# Patient Record
Sex: FEMALE | Race: BLACK OR AFRICAN AMERICAN | Employment: FULL TIME | URBAN - NONMETROPOLITAN AREA
[De-identification: names, ages, dates, MRNs, and addresses within clinical notes are randomized per-mention and may not be internally consistent; named-entity substitution may affect disease eponyms.]

---

## 2022-01-01 ENCOUNTER — APPOINTMENT (OUTPATIENT)
Dept: CT IMAGING | Age: 57
DRG: 023 | End: 2022-01-01

## 2022-01-01 ENCOUNTER — APPOINTMENT (OUTPATIENT)
Dept: GENERAL RADIOLOGY | Age: 57
DRG: 023 | End: 2022-01-01

## 2022-01-01 ENCOUNTER — ANESTHESIA EVENT (OUTPATIENT)
Dept: OPERATING ROOM | Age: 57
DRG: 023 | End: 2022-01-01

## 2022-01-01 ENCOUNTER — APPOINTMENT (OUTPATIENT)
Dept: NUCLEAR MEDICINE | Age: 57
DRG: 023 | End: 2022-01-01

## 2022-01-01 ENCOUNTER — ANESTHESIA (OUTPATIENT)
Dept: OPERATING ROOM | Age: 57
DRG: 023 | End: 2022-01-01

## 2022-01-01 ENCOUNTER — HOSPITAL ENCOUNTER (INPATIENT)
Age: 57
LOS: 7 days | DRG: 023 | End: 2022-07-07
Attending: EMERGENCY MEDICINE | Admitting: INTERNAL MEDICINE

## 2022-01-01 DIAGNOSIS — Z52.9 ORGAN DONATION: ICD-10-CM

## 2022-01-01 DIAGNOSIS — D49.9 TUMOR: ICD-10-CM

## 2022-01-01 DIAGNOSIS — I62.9 INTRACRANIAL HEMORRHAGE (HCC): Primary | ICD-10-CM

## 2022-01-01 LAB
% INHIBITION AA: 2.6 %
% INHIBITION ADP: 92.2 %
AA % AGGREGATION: 97.4 %
AA AGONIST MAX AMPLITUDE: 64.5 MM (ref 51–71)
ABO: NORMAL
ACCELERATED MAX AMPLITUDE: 65.1 MM (ref 52–70)
ACETAMINOPHEN LEVEL: < 5 UG/ML (ref 0–20)
ACINETOBACTER CALCOACETICUS-BAUMANNII BY PCR: NOT DETECTED
ADENOVIRUS BY PCR: NOT DETECTED
ADP AGONIST MAX AMPLITUDE: 19.9 MM (ref 45–69)
ADP PERCENT AGGREGATION: 7.8 %
ALBUMIN SERPL-MCNC: 2.2 G/DL (ref 3.5–5.1)
ALBUMIN SERPL-MCNC: 2.5 G/DL (ref 3.5–5.1)
ALBUMIN SERPL-MCNC: 2.8 G/DL (ref 3.5–5.1)
ALBUMIN SERPL-MCNC: 4.5 G/DL (ref 3.5–5.1)
ALLEN TEST: ABNORMAL
ALLEN TEST: NEGATIVE
ALLEN TEST: NORMAL
ALP BLD-CCNC: 106 U/L (ref 38–126)
ALP BLD-CCNC: 54 U/L (ref 38–126)
ALP BLD-CCNC: 95 U/L (ref 38–126)
ALP BLD-CCNC: 96 U/L (ref 38–126)
ALT SERPL-CCNC: 19 U/L (ref 11–66)
ALT SERPL-CCNC: 6 U/L (ref 11–66)
ALT SERPL-CCNC: < 5 U/L (ref 11–66)
ALT SERPL-CCNC: < 5 U/L (ref 11–66)
AMPHETAMINE+METHAMPHETAMINE URINE SCREEN: NEGATIVE
AMYLASE: 84 U/L (ref 20–104)
ANION GAP SERPL CALCULATED.3IONS-SCNC: 10 MEQ/L (ref 8–16)
ANION GAP SERPL CALCULATED.3IONS-SCNC: 10 MEQ/L (ref 8–16)
ANION GAP SERPL CALCULATED.3IONS-SCNC: 11 MEQ/L (ref 8–16)
ANION GAP SERPL CALCULATED.3IONS-SCNC: 12 MEQ/L (ref 8–16)
ANION GAP SERPL CALCULATED.3IONS-SCNC: 13 MEQ/L (ref 8–16)
ANION GAP SERPL CALCULATED.3IONS-SCNC: 18 MEQ/L (ref 8–16)
ANION GAP SERPL CALCULATED.3IONS-SCNC: 7 MEQ/L (ref 8–16)
ANION GAP SERPL CALCULATED.3IONS-SCNC: 7 MEQ/L (ref 8–16)
ANION GAP SERPL CALCULATED.3IONS-SCNC: 9 MEQ/L (ref 8–16)
ANTIBODY SCREEN: NORMAL
APTT: 35.3 SECONDS (ref 22–38)
APTT: 37.7 SECONDS (ref 22–38)
AST SERPL-CCNC: 15 U/L (ref 5–40)
AST SERPL-CCNC: 15 U/L (ref 5–40)
AST SERPL-CCNC: 19 U/L (ref 5–40)
AST SERPL-CCNC: 33 U/L (ref 5–40)
AVERAGE GLUCOSE: 123 MG/DL (ref 70–126)
BACTERIA: ABNORMAL
BACTERIA: ABNORMAL /HPF
BARBITURATE QUANTITATIVE URINE: NEGATIVE
BASE EXCESS (CALCULATED): -0.5 MMOL/L (ref -2.5–2.5)
BASE EXCESS (CALCULATED): -0.9 MMOL/L (ref -2.5–2.5)
BASE EXCESS (CALCULATED): -2.6 MMOL/L (ref -2.5–2.5)
BASE EXCESS (CALCULATED): -3.3 MMOL/L (ref -2.5–2.5)
BASE EXCESS (CALCULATED): -5.3 MMOL/L (ref -2.5–2.5)
BASE EXCESS (CALCULATED): 0 MMOL/L (ref -2.5–2.5)
BASE EXCESS MIXED: 2 MMOL/L (ref -2–3)
BASOPHILS # BLD: 0.1 %
BASOPHILS ABSOLUTE: 0 THOU/MM3 (ref 0–0.1)
BENZODIAZEPINE QUANTITATIVE URINE: NEGATIVE
BILIRUB SERPL-MCNC: 0.3 MG/DL (ref 0.3–1.2)
BILIRUB SERPL-MCNC: 0.5 MG/DL (ref 0.3–1.2)
BILIRUB SERPL-MCNC: 0.8 MG/DL (ref 0.3–1.2)
BILIRUB SERPL-MCNC: 0.9 MG/DL (ref 0.3–1.2)
BILIRUBIN DIRECT: 0.6 MG/DL (ref 0–0.3)
BILIRUBIN DIRECT: 0.6 MG/DL (ref 0–0.3)
BILIRUBIN URINE: ABNORMAL
BILIRUBIN URINE: NEGATIVE
BLOOD, URINE: ABNORMAL
BLOOD, URINE: ABNORMAL
BUN BLDV-MCNC: 13 MG/DL (ref 7–22)
BUN BLDV-MCNC: 14 MG/DL (ref 7–22)
BUN BLDV-MCNC: 14 MG/DL (ref 7–22)
BUN BLDV-MCNC: 16 MG/DL (ref 7–22)
BUN BLDV-MCNC: 17 MG/DL (ref 7–22)
C-REACTIVE PROTEIN: 1.29 MG/DL (ref 0–1)
CALCIUM IONIZED: 0.99 MMOL/L (ref 1.12–1.32)
CALCIUM IONIZED: 1.02 MMOL/L (ref 1.12–1.32)
CALCIUM SERPL-MCNC: 10.1 MG/DL (ref 8.5–10.5)
CALCIUM SERPL-MCNC: 7.6 MG/DL (ref 8.5–10.5)
CALCIUM SERPL-MCNC: 7.7 MG/DL (ref 8.5–10.5)
CALCIUM SERPL-MCNC: 8.1 MG/DL (ref 8.5–10.5)
CALCIUM SERPL-MCNC: 8.2 MG/DL (ref 8.5–10.5)
CALCIUM SERPL-MCNC: 8.4 MG/DL (ref 8.5–10.5)
CALCIUM SERPL-MCNC: 8.4 MG/DL (ref 8.5–10.5)
CALCIUM SERPL-MCNC: 8.8 MG/DL (ref 8.5–10.5)
CALCIUM SERPL-MCNC: 8.8 MG/DL (ref 8.5–10.5)
CALCIUM SERPL-MCNC: 8.9 MG/DL (ref 8.5–10.5)
CALCIUM SERPL-MCNC: 9 MG/DL (ref 8.5–10.5)
CALCIUM SERPL-MCNC: 9.1 MG/DL (ref 8.5–10.5)
CALCIUM SERPL-MCNC: 9.1 MG/DL (ref 8.5–10.5)
CALCIUM SERPL-MCNC: 9.3 MG/DL (ref 8.5–10.5)
CALCIUM SERPL-MCNC: 9.5 MG/DL (ref 8.5–10.5)
CANNABINOID QUANTITATIVE URINE: NEGATIVE
CASTS 2: ABNORMAL /LPF
CASTS UA: ABNORMAL /LPF
CASTS: ABNORMAL /LPF
CASTS: ABNORMAL /LPF
CHARACTER, URINE: CLEAR
CHARACTER, URINE: CLEAR
CHLAMYDIA PNEUMONIAE BY PCR: NOT DETECTED
CHLORIDE BLD-SCNC: 102 MEQ/L (ref 98–111)
CHLORIDE BLD-SCNC: 112 MEQ/L (ref 98–111)
CHLORIDE BLD-SCNC: 113 MEQ/L (ref 98–111)
CHLORIDE BLD-SCNC: 115 MEQ/L (ref 98–111)
CHLORIDE BLD-SCNC: 118 MEQ/L (ref 98–111)
CHLORIDE BLD-SCNC: 124 MEQ/L (ref 98–111)
CHLORIDE BLD-SCNC: 127 MEQ/L (ref 98–111)
CHLORIDE BLD-SCNC: 128 MEQ/L (ref 98–111)
CHLORIDE BLD-SCNC: 132 MEQ/L (ref 98–111)
CHLORIDE BLD-SCNC: 138 MEQ/L (ref 98–111)
CHLORIDE BLD-SCNC: 139 MEQ/L (ref 98–111)
CHLORIDE BLD-SCNC: > 140 MEQ/L (ref 98–111)
CITRATED KAOLIN LY30: 0 % (ref 0–2.6)
CITRATED KAOLIN R TIME: 4.7 MINUTES (ref 4.6–9.1)
CO2: 19 MEQ/L (ref 23–33)
CO2: 20 MEQ/L (ref 23–33)
CO2: 21 MEQ/L (ref 23–33)
CO2: 22 MEQ/L (ref 23–33)
CO2: 23 MEQ/L (ref 23–33)
CO2: 24 MEQ/L (ref 23–33)
CO2: 27 MEQ/L (ref 23–33)
COCAINE METABOLITE QUANTITATIVE URINE: NEGATIVE
COLLECTED BY:: ABNORMAL
COLLECTED BY:: NORMAL
COLOR: ABNORMAL
COLOR: YELLOW
CREAT SERPL-MCNC: 1.2 MG/DL (ref 0.4–1.2)
CREAT SERPL-MCNC: 1.3 MG/DL (ref 0.4–1.2)
CREAT SERPL-MCNC: 1.4 MG/DL (ref 0.4–1.2)
CREAT SERPL-MCNC: 1.4 MG/DL (ref 0.4–1.2)
CREAT SERPL-MCNC: 1.5 MG/DL (ref 0.4–1.2)
CREAT SERPL-MCNC: 1.6 MG/DL (ref 0.4–1.2)
CREAT SERPL-MCNC: 1.6 MG/DL (ref 0.4–1.2)
CREAT SERPL-MCNC: 1.7 MG/DL (ref 0.4–1.2)
CREAT SERPL-MCNC: 1.8 MG/DL (ref 0.4–1.2)
CREAT SERPL-MCNC: 1.9 MG/DL (ref 0.4–1.2)
CREAT SERPL-MCNC: 1.9 MG/DL (ref 0.4–1.2)
CRYSTALS, UA: ABNORMAL
CRYSTALS: ABNORMAL
DEVICE: ABNORMAL
DEVICE: NORMAL
EKG ATRIAL RATE: 92 BPM
EKG ATRIAL RATE: 94 BPM
EKG ATRIAL RATE: 99 BPM
EKG P AXIS: 41 DEGREES
EKG P AXIS: 48 DEGREES
EKG P AXIS: 59 DEGREES
EKG P-R INTERVAL: 126 MS
EKG P-R INTERVAL: 134 MS
EKG P-R INTERVAL: 136 MS
EKG Q-T INTERVAL: 342 MS
EKG Q-T INTERVAL: 348 MS
EKG Q-T INTERVAL: 404 MS
EKG QRS DURATION: 78 MS
EKG QRS DURATION: 80 MS
EKG QRS DURATION: 84 MS
EKG QTC CALCULATION (BAZETT): 430 MS
EKG QTC CALCULATION (BAZETT): 438 MS
EKG QTC CALCULATION (BAZETT): 505 MS
EKG R AXIS: 59 DEGREES
EKG R AXIS: 66 DEGREES
EKG R AXIS: 73 DEGREES
EKG T AXIS: -3 DEGREES
EKG T AXIS: 49 DEGREES
EKG T AXIS: 49 DEGREES
EKG VENTRICULAR RATE: 92 BPM
EKG VENTRICULAR RATE: 94 BPM
EKG VENTRICULAR RATE: 99 BPM
ENTEROBACTER CLOACAE COMPLEX BY PCR: NOT DETECTED
EOSINOPHIL # BLD: 0 %
EOSINOPHILS ABSOLUTE: 0 THOU/MM3 (ref 0–0.4)
EPITHELIAL CELLS, UA: ABNORMAL /HPF
EPITHELIAL CELLS, UA: ABNORMAL /HPF
ERYTHROCYTE [DISTWIDTH] IN BLOOD BY AUTOMATED COUNT: 15.3 % (ref 11.5–14.5)
ERYTHROCYTE [DISTWIDTH] IN BLOOD BY AUTOMATED COUNT: 15.5 % (ref 11.5–14.5)
ERYTHROCYTE [DISTWIDTH] IN BLOOD BY AUTOMATED COUNT: 15.7 % (ref 11.5–14.5)
ERYTHROCYTE [DISTWIDTH] IN BLOOD BY AUTOMATED COUNT: 15.9 % (ref 11.5–14.5)
ERYTHROCYTE [DISTWIDTH] IN BLOOD BY AUTOMATED COUNT: 15.9 % (ref 11.5–14.5)
ERYTHROCYTE [DISTWIDTH] IN BLOOD BY AUTOMATED COUNT: 16.4 % (ref 11.5–14.5)
ERYTHROCYTE [DISTWIDTH] IN BLOOD BY AUTOMATED COUNT: 16.4 % (ref 11.5–14.5)
ERYTHROCYTE [DISTWIDTH] IN BLOOD BY AUTOMATED COUNT: 54.8 FL (ref 35–45)
ERYTHROCYTE [DISTWIDTH] IN BLOOD BY AUTOMATED COUNT: 55.4 FL (ref 35–45)
ERYTHROCYTE [DISTWIDTH] IN BLOOD BY AUTOMATED COUNT: 56.4 FL (ref 35–45)
ERYTHROCYTE [DISTWIDTH] IN BLOOD BY AUTOMATED COUNT: 56.6 FL (ref 35–45)
ERYTHROCYTE [DISTWIDTH] IN BLOOD BY AUTOMATED COUNT: 57.1 FL (ref 35–45)
ERYTHROCYTE [DISTWIDTH] IN BLOOD BY AUTOMATED COUNT: 59.2 FL (ref 35–45)
ERYTHROCYTE [DISTWIDTH] IN BLOOD BY AUTOMATED COUNT: 62.3 FL (ref 35–45)
ESCHERICHIA COLI BY PCR: NOT DETECTED
ETHYL ALCOHOL, SERUM: < 0.01 %
FIBRINOGEN MAX AMPLITUDE: 24.5 MM (ref 15–32)
GFR SERPL CREATININE-BSD FRML MDRD: 33 ML/MIN/1.73M2
GFR SERPL CREATININE-BSD FRML MDRD: 33 ML/MIN/1.73M2
GFR SERPL CREATININE-BSD FRML MDRD: 35 ML/MIN/1.73M2
GFR SERPL CREATININE-BSD FRML MDRD: 37 ML/MIN/1.73M2
GFR SERPL CREATININE-BSD FRML MDRD: 40 ML/MIN/1.73M2
GFR SERPL CREATININE-BSD FRML MDRD: 40 ML/MIN/1.73M2
GFR SERPL CREATININE-BSD FRML MDRD: 43 ML/MIN/1.73M2
GFR SERPL CREATININE-BSD FRML MDRD: 47 ML/MIN/1.73M2
GFR SERPL CREATININE-BSD FRML MDRD: 47 ML/MIN/1.73M2
GFR SERPL CREATININE-BSD FRML MDRD: 51 ML/MIN/1.73M2
GFR SERPL CREATININE-BSD FRML MDRD: 56 ML/MIN/1.73M2
GLUCOSE BLD-MCNC: 103 MG/DL (ref 70–108)
GLUCOSE BLD-MCNC: 106 MG/DL (ref 70–108)
GLUCOSE BLD-MCNC: 107 MG/DL (ref 70–108)
GLUCOSE BLD-MCNC: 118 MG/DL (ref 70–108)
GLUCOSE BLD-MCNC: 120 MG/DL (ref 70–108)
GLUCOSE BLD-MCNC: 123 MG/DL (ref 70–108)
GLUCOSE BLD-MCNC: 130 MG/DL (ref 70–108)
GLUCOSE BLD-MCNC: 152 MG/DL (ref 70–108)
GLUCOSE BLD-MCNC: 152 MG/DL (ref 70–108)
GLUCOSE BLD-MCNC: 170 MG/DL (ref 70–108)
GLUCOSE BLD-MCNC: 172 MG/DL (ref 70–108)
GLUCOSE BLD-MCNC: 190 MG/DL (ref 70–108)
GLUCOSE BLD-MCNC: 206 MG/DL (ref 70–108)
GLUCOSE BLD-MCNC: 244 MG/DL (ref 70–108)
GLUCOSE BLD-MCNC: 99 MG/DL (ref 70–108)
GLUCOSE URINE: NEGATIVE MG/DL
GLUCOSE, URINE: NEGATIVE MG/DL
GLUCOSE, WHOLE BLOOD: 122 MG/DL (ref 70–108)
GLUCOSE, WHOLE BLOOD: 187 MG/DL (ref 70–108)
GRAM STAIN RESULT: NORMAL
HAEMOPHILUS INFLUENZAE BY PCR: NOT DETECTED
HBA1C MFR BLD: 6.1 % (ref 4.4–6.4)
HCO3, MIXED: 27 MMOL/L (ref 23–28)
HCO3: 22 MMOL/L (ref 23–28)
HCO3: 22 MMOL/L (ref 23–28)
HCO3: 23 MMOL/L (ref 23–28)
HCO3: 24 MMOL/L (ref 23–28)
HCT VFR BLD CALC: 39.3 % (ref 37–47)
HCT VFR BLD CALC: 39.8 % (ref 37–47)
HCT VFR BLD CALC: 39.8 % (ref 37–47)
HCT VFR BLD CALC: 43 % (ref 37–47)
HCT VFR BLD CALC: 45.7 % (ref 37–47)
HCT VFR BLD CALC: 48.8 % (ref 37–47)
HCT VFR BLD CALC: 56.8 % (ref 37–47)
HEMOGLOBIN: 12.2 GM/DL (ref 12–16)
HEMOGLOBIN: 12.3 GM/DL (ref 12–16)
HEMOGLOBIN: 12.5 GM/DL (ref 12–16)
HEMOGLOBIN: 13.8 GM/DL (ref 12–16)
HEMOGLOBIN: 14.7 GM/DL (ref 12–16)
HEMOGLOBIN: 15.8 GM/DL (ref 12–16)
HEMOGLOBIN: 18.4 GM/DL (ref 12–16)
ICTOTEST: NEGATIVE
IFIO2: 100
IFIO2: 100
IFIO2: 25
IFIO2: 25
IFIO2: 35
IMMATURE GRANS (ABS): 0.03 THOU/MM3 (ref 0–0.07)
IMMATURE GRANS (ABS): 0.06 THOU/MM3 (ref 0–0.07)
IMMATURE GRANS (ABS): 0.07 THOU/MM3 (ref 0–0.07)
IMMATURE GRANULOCYTES: 0.3 %
IMMATURE GRANULOCYTES: 0.4 %
IMMATURE GRANULOCYTES: 0.4 %
INFLUENZA A BY PCR: NOT DETECTED
INFLUENZA B BY PCR: NOT DETECTED
INHIBITED PLATELET MAX AMPLITUDE: 16 MM (ref 2–19)
INR BLD: 1.08 (ref 0.85–1.13)
INR BLD: 1.57 (ref 0.85–1.13)
INR BLD: 1.66 (ref 0.85–1.13)
KETONES, URINE: ABNORMAL
KETONES, URINE: NEGATIVE
KLEBSIELLA AEROGENES BY PCR: NOT DETECTED
KLEBSIELLA OXYTOCA BY PCR: NOT DETECTED
KLEBSIELLA PNEUMONIAE GROUP BY PCR: NOT DETECTED
LACTIC ACID, SEPSIS: 2.9 MMOL/L (ref 0.5–1.9)
LACTIC ACID: 0.6 MMOL/L (ref 0.5–2)
LACTIC ACID: 1.2 MMOL/L (ref 0.5–2)
LACTIC ACID: 2.3 MMOL/L (ref 0.5–2)
LACTIC ACID: 2.4 MMOL/L (ref 0.5–2)
LEGIONELLA PNEUMOPHILIA BY PCR: NOT DETECTED
LEUKOCYTE ESTERASE, URINE: ABNORMAL
LEUKOCYTE ESTERASE, URINE: NEGATIVE
LIPASE: 17.2 U/L (ref 5.6–51.3)
LIPASE: 33.8 U/L (ref 5.6–51.3)
LV EF: 60 %
LVEF MODALITY: NORMAL
LYMPHOCYTES # BLD: 4.3 %
LYMPHOCYTES # BLD: 4.6 %
LYMPHOCYTES # BLD: 6 %
LYMPHOCYTES ABSOLUTE: 0.7 THOU/MM3 (ref 1–4.8)
LYMPHOCYTES ABSOLUTE: 0.7 THOU/MM3 (ref 1–4.8)
LYMPHOCYTES ABSOLUTE: 0.8 THOU/MM3 (ref 1–4.8)
MAGNESIUM: 1.7 MG/DL (ref 1.6–2.4)
MAGNESIUM: 1.7 MG/DL (ref 1.6–2.4)
MAGNESIUM: 1.8 MG/DL (ref 1.6–2.4)
MAGNESIUM: 2 MG/DL (ref 1.6–2.4)
MAXIMUM AMPLITUDE: 65.8 MM (ref 53–68)
MCH RBC QN AUTO: 31.3 PG (ref 26–33)
MCH RBC QN AUTO: 31.4 PG (ref 26–33)
MCH RBC QN AUTO: 31.4 PG (ref 26–33)
MCH RBC QN AUTO: 31.5 PG (ref 26–33)
MCHC RBC AUTO-ENTMCNC: 30.7 GM/DL (ref 32.2–35.5)
MCHC RBC AUTO-ENTMCNC: 31.3 GM/DL (ref 32.2–35.5)
MCHC RBC AUTO-ENTMCNC: 31.4 GM/DL (ref 32.2–35.5)
MCHC RBC AUTO-ENTMCNC: 32.1 GM/DL (ref 32.2–35.5)
MCHC RBC AUTO-ENTMCNC: 32.2 GM/DL (ref 32.2–35.5)
MCHC RBC AUTO-ENTMCNC: 32.4 GM/DL (ref 32.2–35.5)
MCHC RBC AUTO-ENTMCNC: 32.4 GM/DL (ref 32.2–35.5)
MCV RBC AUTO: 100 FL (ref 81–99)
MCV RBC AUTO: 100.5 FL (ref 81–99)
MCV RBC AUTO: 102.8 FL (ref 81–99)
MCV RBC AUTO: 97.1 FL (ref 81–99)
MCV RBC AUTO: 97.4 FL (ref 81–99)
MCV RBC AUTO: 97.4 FL (ref 81–99)
MCV RBC AUTO: 97.7 FL (ref 81–99)
METAPNEUMOVIRUS BY PCR: NOT DETECTED
MISCELLANEOUS 2: ABNORMAL
MISCELLANEOUS LAB TEST RESULT: ABNORMAL
MODE: ABNORMAL
MODE: NORMAL
MONOCYTES # BLD: 3.5 %
MONOCYTES # BLD: 6.7 %
MONOCYTES # BLD: 6.9 %
MONOCYTES ABSOLUTE: 0.4 THOU/MM3 (ref 0.4–1.3)
MONOCYTES ABSOLUTE: 1.1 THOU/MM3 (ref 0.4–1.3)
MONOCYTES ABSOLUTE: 1.2 THOU/MM3 (ref 0.4–1.3)
MORAXELLA CATARRHALIS BY PCR: NOT DETECTED
MRSA SCREEN RT-PCR: NEGATIVE
MYCOPLASMA PNEUMONIAE BY PCR: NOT DETECTED
NITRITE, URINE: NEGATIVE
NITRITE, URINE: NEGATIVE
NON-SARS CORONAVIRUS: NOT DETECTED
NUCLEATED RED BLOOD CELLS: 0 /100 WBC
O2 SAT, MIXED: 78 %
O2 SATURATION: 100 %
O2 SATURATION: 100 %
O2 SATURATION: 83 %
O2 SATURATION: 93 %
O2 SATURATION: 96 %
O2 SATURATION: 98 %
OPIATES, URINE: NEGATIVE
OSMOLALITY CALCULATION: 289.1 MOSMOL/KG (ref 275–300)
OSMOLALITY URINE: 167 MOSMOL/KG (ref 250–750)
OSMOLALITY URINE: 76 MOSMOL/KG (ref 250–750)
OSMOLALITY: 306 MOSMOL/KG (ref 275–295)
OSMOLALITY: 363 MOSMOL/KG (ref 275–295)
OXYCODONE: NEGATIVE
PARAINFLUENZA VIRUS BY PCR: NOT DETECTED
PCO2, MIXED VENOUS: 42 MMHG (ref 41–51)
PCO2: 30 MMHG (ref 35–45)
PCO2: 37 MMHG (ref 35–45)
PCO2: 37 MMHG (ref 35–45)
PCO2: 40 MMHG (ref 35–45)
PCO2: 42 MMHG (ref 35–45)
PCO2: 56 MMHG (ref 35–45)
PH BLOOD GAS: 7.22 (ref 7.35–7.45)
PH BLOOD GAS: 7.35 (ref 7.35–7.45)
PH BLOOD GAS: 7.35 (ref 7.35–7.45)
PH BLOOD GAS: 7.41 (ref 7.35–7.45)
PH BLOOD GAS: 7.42 (ref 7.35–7.45)
PH BLOOD GAS: 7.48 (ref 7.35–7.45)
PH UA: 7 (ref 5–9)
PH UA: 7 (ref 5–9)
PH, MIXED: 7.41 (ref 7.31–7.41)
PHENCYCLIDINE QUANTITATIVE URINE: NEGATIVE
PHOSPHORUS: 2.6 MG/DL (ref 2.4–4.7)
PHOSPHORUS: 2.8 MG/DL (ref 2.4–4.7)
PHOSPHORUS: 3 MG/DL (ref 2.4–4.7)
PIP: 28 CMH2O
PIP: 29 CMH2O
PIP: 30 CMH2O
PLATELET # BLD: 205 THOU/MM3 (ref 130–400)
PLATELET # BLD: 209 THOU/MM3 (ref 130–400)
PLATELET # BLD: 211 THOU/MM3 (ref 130–400)
PLATELET # BLD: 224 THOU/MM3 (ref 130–400)
PLATELET # BLD: 226 THOU/MM3 (ref 130–400)
PLATELET # BLD: 241 THOU/MM3 (ref 130–400)
PLATELET # BLD: 270 THOU/MM3 (ref 130–400)
PMV BLD AUTO: 10.4 FL (ref 9.4–12.4)
PMV BLD AUTO: 10.5 FL (ref 9.4–12.4)
PMV BLD AUTO: 10.8 FL (ref 9.4–12.4)
PMV BLD AUTO: 10.9 FL (ref 9.4–12.4)
PMV BLD AUTO: 11 FL (ref 9.4–12.4)
PMV BLD AUTO: 11 FL (ref 9.4–12.4)
PMV BLD AUTO: 11.1 FL (ref 9.4–12.4)
PO2 MIXED: 42 MMHG (ref 25–40)
PO2: 384 MMHG (ref 71–104)
PO2: 385 MMHG (ref 71–104)
PO2: 57 MMHG (ref 71–104)
PO2: 65 MMHG (ref 71–104)
PO2: 78 MMHG (ref 71–104)
PO2: 91 MMHG (ref 71–104)
POC CREATININE WHOLE BLOOD: 1.4 MG/DL (ref 0.5–1.2)
POTASSIUM REFLEX MAGNESIUM: 3.9 MEQ/L (ref 3.5–5.2)
POTASSIUM SERPL-SCNC: 2.8 MEQ/L (ref 3.5–5.2)
POTASSIUM SERPL-SCNC: 3.3 MEQ/L (ref 3.5–5.2)
POTASSIUM SERPL-SCNC: 3.4 MEQ/L (ref 3.5–5.2)
POTASSIUM SERPL-SCNC: 3.4 MEQ/L (ref 3.5–5.2)
POTASSIUM SERPL-SCNC: 3.5 MEQ/L (ref 3.5–5.2)
POTASSIUM SERPL-SCNC: 3.6 MEQ/L (ref 3.5–5.2)
POTASSIUM SERPL-SCNC: 3.7 MEQ/L (ref 3.5–5.2)
POTASSIUM SERPL-SCNC: 3.8 MEQ/L (ref 3.5–5.2)
POTASSIUM SERPL-SCNC: 4.1 MEQ/L (ref 3.5–5.2)
PROTEIN UA: 100 MG/DL
PROTEIN UA: 300
PROTEUS SPECIES BY PCR: NOT DETECTED
PSEUDOMONAS AERUGINOSA BY PCR: NOT DETECTED
RBC # BLD: 3.87 MILL/MM3 (ref 4.2–5.4)
RBC # BLD: 3.91 MILL/MM3 (ref 4.2–5.4)
RBC # BLD: 3.98 MILL/MM3 (ref 4.2–5.4)
RBC # BLD: 4.4 MILL/MM3 (ref 4.2–5.4)
RBC # BLD: 4.69 MILL/MM3 (ref 4.2–5.4)
RBC # BLD: 5.01 MILL/MM3 (ref 4.2–5.4)
RBC # BLD: 5.85 MILL/MM3 (ref 4.2–5.4)
RBC URINE: ABNORMAL /HPF
RBC URINE: ABNORMAL /HPF
RENAL EPITHELIAL, UA: ABNORMAL
RENAL EPITHELIAL, UA: ABNORMAL
RESISTANT GENE CTX-M BY PCR: NORMAL
RESISTANT GENE IMP BY PCR: NORMAL
RESISTANT GENE KPC BY PCR: NORMAL
RESISTANT GENE MECA/C & MREJ BY PCR: NORMAL
RESISTANT GENE NDM BY PCR: NORMAL
RESISTANT GENE OXA-48-LIKE BY PCR: NORMAL
RESISTANT GENE VIM BY PCR: NORMAL
RESPIRATORY CULTURE: NORMAL
RESPIRATORY SYNCYTIAL VIRUS BY PCR: NOT DETECTED
RH FACTOR: NORMAL
RHINOVIRUS ENTEROVIRUS PCR: NOT DETECTED
SALICYLATE, SERUM: < 0.3 MG/DL (ref 2–10)
SARS-COV-2, NAAT: NOT  DETECTED
SCAN OF BLOOD SMEAR: NORMAL
SEG NEUTROPHILS: 88.2 %
SEG NEUTROPHILS: 88.3 %
SEG NEUTROPHILS: 90.1 %
SEGMENTED NEUTROPHILS ABSOLUTE COUNT: 10.5 THOU/MM3 (ref 1.8–7.7)
SEGMENTED NEUTROPHILS ABSOLUTE COUNT: 14.6 THOU/MM3 (ref 1.8–7.7)
SEGMENTED NEUTROPHILS ABSOLUTE COUNT: 15 THOU/MM3 (ref 1.8–7.7)
SERRATIA MARCESCENS BY PCR: NOT DETECTED
SET PEEP: 5 MMHG
SET PEEP: 6 MMHG
SET RESPIRATORY RATE: 14 BPM
SET RESPIRATORY RATE: 16 BPM
SET RESPIRATORY RATE: 16 BPM
SET RESPIRATORY RATE: 20 BPM
SET RESPIRATORY RATE: 20 BPM
SODIUM BLD-SCNC: 142 MEQ/L (ref 135–145)
SODIUM BLD-SCNC: 143 MEQ/L (ref 135–145)
SODIUM BLD-SCNC: 144 MEQ/L (ref 135–145)
SODIUM BLD-SCNC: 145 MEQ/L (ref 135–145)
SODIUM BLD-SCNC: 147 MEQ/L (ref 135–145)
SODIUM BLD-SCNC: 148 MEQ/L (ref 135–145)
SODIUM BLD-SCNC: 149 MEQ/L (ref 135–145)
SODIUM BLD-SCNC: 149 MEQ/L (ref 135–145)
SODIUM BLD-SCNC: 150 MEQ/L (ref 135–145)
SODIUM BLD-SCNC: 151 MEQ/L (ref 135–145)
SODIUM BLD-SCNC: 154 MEQ/L (ref 135–145)
SODIUM BLD-SCNC: 154 MEQ/L (ref 135–145)
SODIUM BLD-SCNC: 156 MEQ/L (ref 135–145)
SODIUM BLD-SCNC: 156 MEQ/L (ref 135–145)
SODIUM BLD-SCNC: 157 MEQ/L (ref 135–145)
SODIUM BLD-SCNC: 158 MEQ/L (ref 135–145)
SODIUM BLD-SCNC: 159 MEQ/L (ref 135–145)
SODIUM BLD-SCNC: 160 MEQ/L (ref 135–145)
SODIUM BLD-SCNC: 161 MEQ/L (ref 135–145)
SODIUM BLD-SCNC: 163 MEQ/L (ref 135–145)
SODIUM BLD-SCNC: 163 MEQ/L (ref 135–145)
SODIUM BLD-SCNC: 166 MEQ/L (ref 135–145)
SODIUM BLD-SCNC: 167 MEQ/L (ref 135–145)
SODIUM BLD-SCNC: 171 MEQ/L (ref 135–145)
SODIUM BLD-SCNC: 172 MEQ/L (ref 135–145)
SODIUM BLD-SCNC: 174 MEQ/L (ref 135–145)
SODIUM BLD-SCNC: 174 MEQ/L (ref 135–145)
SODIUM URINE: 39 MEQ/L
SODIUM, WHOLE BLOOD: > 180 MEQ/L (ref 138–146)
SODIUM, WHOLE BLOOD: > 180 MEQ/L (ref 138–146)
SOURCE, BLOOD GAS: ABNORMAL
SOURCE, BLOOD GAS: NORMAL
SOURCE: NORMAL
SPECIFIC GRAVITY UA: 1.02 (ref 1–1.03)
SPECIFIC GRAVITY, URINE: 1.02 (ref 1–1.03)
SPECIMEN ACCEPTABILITY: NORMAL
STAPH AUREUS BY PCR: NOT DETECTED
STREP AGALACTIAE BY PCR: NOT DETECTED
STREP PNEUMONIAE BY PCR: NOT DETECTED
STREP PYOGENES BY PCR: NOT DETECTED
TOTAL CK: 87 U/L (ref 30–135)
TOTAL PROTEIN: 5.1 G/DL (ref 6.1–8)
TOTAL PROTEIN: 5.7 G/DL (ref 6.1–8)
TOTAL PROTEIN: 6.4 G/DL (ref 6.1–8)
TOTAL PROTEIN: 9.5 G/DL (ref 6.1–8)
TROPONIN T: 0.02 NG/ML
TROPONIN T: 0.02 NG/ML
TROPONIN T: < 0.01 NG/ML
TROPONIN T: < 0.01 NG/ML
URINE CULTURE, ROUTINE: NORMAL
UROBILINOGEN, URINE: 0.2 EU/DL (ref 0–1)
UROBILINOGEN, URINE: 1 EU/DL (ref 0–1)
VANCOMYCIN RESISTANT ENTEROCOCCUS: NEGATIVE
WBC # BLD: 10.1 THOU/MM3 (ref 4.8–10.8)
WBC # BLD: 11.6 THOU/MM3 (ref 4.8–10.8)
WBC # BLD: 11.7 THOU/MM3 (ref 4.8–10.8)
WBC # BLD: 14.7 THOU/MM3 (ref 4.8–10.8)
WBC # BLD: 14.8 THOU/MM3 (ref 4.8–10.8)
WBC # BLD: 16.6 THOU/MM3 (ref 4.8–10.8)
WBC # BLD: 17 THOU/MM3 (ref 4.8–10.8)
WBC UA: ABNORMAL /HPF
WBC UA: ABNORMAL /HPF
YEAST: ABNORMAL
YEAST: ABNORMAL

## 2022-01-01 PROCEDURE — 6360000002 HC RX W HCPCS: Performed by: NURSE PRACTITIONER

## 2022-01-01 PROCEDURE — 95816 EEG AWAKE AND DROWSY: CPT

## 2022-01-01 PROCEDURE — 84100 ASSAY OF PHOSPHORUS: CPT

## 2022-01-01 PROCEDURE — 99291 CRITICAL CARE FIRST HOUR: CPT | Performed by: INTERNAL MEDICINE

## 2022-01-01 PROCEDURE — 83605 ASSAY OF LACTIC ACID: CPT

## 2022-01-01 PROCEDURE — 83935 ASSAY OF URINE OSMOLALITY: CPT

## 2022-01-01 PROCEDURE — 93010 ELECTROCARDIOGRAM REPORT: CPT | Performed by: INTERNAL MEDICINE

## 2022-01-01 PROCEDURE — 85025 COMPLETE CBC W/AUTO DIFF WBC: CPT

## 2022-01-01 PROCEDURE — 81001 URINALYSIS AUTO W/SCOPE: CPT

## 2022-01-01 PROCEDURE — 93005 ELECTROCARDIOGRAM TRACING: CPT

## 2022-01-01 PROCEDURE — 6370000000 HC RX 637 (ALT 250 FOR IP)

## 2022-01-01 PROCEDURE — 99233 SBSQ HOSP IP/OBS HIGH 50: CPT | Performed by: NEUROLOGICAL SURGERY

## 2022-01-01 PROCEDURE — 2700000000 HC OXYGEN THERAPY PER DAY

## 2022-01-01 PROCEDURE — 2580000003 HC RX 258: Performed by: INTERNAL MEDICINE

## 2022-01-01 PROCEDURE — 82550 ASSAY OF CK (CPK): CPT

## 2022-01-01 PROCEDURE — 3700000000 HC ANESTHESIA ATTENDED CARE: Performed by: NEUROLOGICAL SURGERY

## 2022-01-01 PROCEDURE — 3700000001 HC ADD 15 MINUTES (ANESTHESIA): Performed by: NEUROLOGICAL SURGERY

## 2022-01-01 PROCEDURE — 80179 DRUG ASSAY SALICYLATE: CPT

## 2022-01-01 PROCEDURE — 2000000000 HC ICU R&B

## 2022-01-01 PROCEDURE — 87798 DETECT AGENT NOS DNA AMP: CPT

## 2022-01-01 PROCEDURE — 84484 ASSAY OF TROPONIN QUANT: CPT

## 2022-01-01 PROCEDURE — 95819 EEG AWAKE AND ASLEEP: CPT | Performed by: PSYCHIATRY & NEUROLOGY

## 2022-01-01 PROCEDURE — 80048 BASIC METABOLIC PNL TOTAL CA: CPT

## 2022-01-01 PROCEDURE — 2580000003 HC RX 258: Performed by: PHYSICIAN ASSISTANT

## 2022-01-01 PROCEDURE — 2500000003 HC RX 250 WO HCPCS: Performed by: NURSE PRACTITIONER

## 2022-01-01 PROCEDURE — 94002 VENT MGMT INPAT INIT DAY: CPT

## 2022-01-01 PROCEDURE — 36592 COLLECT BLOOD FROM PICC: CPT

## 2022-01-01 PROCEDURE — 2580000003 HC RX 258: Performed by: NURSE PRACTITIONER

## 2022-01-01 PROCEDURE — 94003 VENT MGMT INPAT SUBQ DAY: CPT

## 2022-01-01 PROCEDURE — 94761 N-INVAS EAR/PLS OXIMETRY MLT: CPT

## 2022-01-01 PROCEDURE — 6360000002 HC RX W HCPCS: Performed by: FAMILY MEDICINE

## 2022-01-01 PROCEDURE — 71260 CT THORAX DX C+: CPT

## 2022-01-01 PROCEDURE — C1713 ANCHOR/SCREW BN/BN,TIS/BN: HCPCS | Performed by: NEUROLOGICAL SURGERY

## 2022-01-01 PROCEDURE — 96376 TX/PRO/DX INJ SAME DRUG ADON: CPT

## 2022-01-01 PROCEDURE — C9113 INJ PANTOPRAZOLE SODIUM, VIA: HCPCS | Performed by: INTERNAL MEDICINE

## 2022-01-01 PROCEDURE — 85027 COMPLETE CBC AUTOMATED: CPT

## 2022-01-01 PROCEDURE — 82803 BLOOD GASES ANY COMBINATION: CPT

## 2022-01-01 PROCEDURE — 87205 SMEAR GRAM STAIN: CPT

## 2022-01-01 PROCEDURE — 85347 COAGULATION TIME ACTIVATED: CPT

## 2022-01-01 PROCEDURE — 80307 DRUG TEST PRSMV CHEM ANLYZR: CPT

## 2022-01-01 PROCEDURE — 2500000003 HC RX 250 WO HCPCS: Performed by: PHYSICIAN ASSISTANT

## 2022-01-01 PROCEDURE — 61210 BURR HOLE IMPLT VENTR CATH: CPT | Performed by: NEUROLOGICAL SURGERY

## 2022-01-01 PROCEDURE — 86850 RBC ANTIBODY SCREEN: CPT

## 2022-01-01 PROCEDURE — 6360000002 HC RX W HCPCS: Performed by: INTERNAL MEDICINE

## 2022-01-01 PROCEDURE — 31500 INSERT EMERGENCY AIRWAY: CPT

## 2022-01-01 PROCEDURE — P9047 ALBUMIN (HUMAN), 25%, 50ML: HCPCS | Performed by: INTERNAL MEDICINE

## 2022-01-01 PROCEDURE — 36415 COLL VENOUS BLD VENIPUNCTURE: CPT

## 2022-01-01 PROCEDURE — 84300 ASSAY OF URINE SODIUM: CPT

## 2022-01-01 PROCEDURE — 2580000003 HC RX 258: Performed by: FAMILY MEDICINE

## 2022-01-01 PROCEDURE — APPSS60 APP SPLIT SHARED TIME 46-60 MINUTES: Performed by: PHYSICIAN ASSISTANT

## 2022-01-01 PROCEDURE — 2580000003 HC RX 258: Performed by: EMERGENCY MEDICINE

## 2022-01-01 PROCEDURE — 009630Z DRAINAGE OF CEREBRAL VENTRICLE WITH DRAINAGE DEVICE, PERCUTANEOUS APPROACH: ICD-10-PCS | Performed by: PHYSICIAN ASSISTANT

## 2022-01-01 PROCEDURE — 93306 TTE W/DOPPLER COMPLETE: CPT

## 2022-01-01 PROCEDURE — 85576 BLOOD PLATELET AGGREGATION: CPT

## 2022-01-01 PROCEDURE — 6360000002 HC RX W HCPCS: Performed by: REGISTERED NURSE

## 2022-01-01 PROCEDURE — 85730 THROMBOPLASTIN TIME PARTIAL: CPT

## 2022-01-01 PROCEDURE — 87070 CULTURE OTHR SPECIMN AEROBIC: CPT

## 2022-01-01 PROCEDURE — 87086 URINE CULTURE/COLONY COUNT: CPT

## 2022-01-01 PROCEDURE — 84295 ASSAY OF SERUM SODIUM: CPT

## 2022-01-01 PROCEDURE — 83735 ASSAY OF MAGNESIUM: CPT

## 2022-01-01 PROCEDURE — 74177 CT ABD & PELVIS W/CONTRAST: CPT

## 2022-01-01 PROCEDURE — A9539 TC99M PENTETATE: HCPCS | Performed by: NURSE PRACTITIONER

## 2022-01-01 PROCEDURE — 6360000004 HC RX CONTRAST MEDICATION: Performed by: INTERNAL MEDICINE

## 2022-01-01 PROCEDURE — 82947 ASSAY GLUCOSE BLOOD QUANT: CPT

## 2022-01-01 PROCEDURE — 87040 BLOOD CULTURE FOR BACTERIA: CPT

## 2022-01-01 PROCEDURE — 6360000002 HC RX W HCPCS: Performed by: PHYSICIAN ASSISTANT

## 2022-01-01 PROCEDURE — 87801 DETECT AGNT MULT DNA AMPLI: CPT

## 2022-01-01 PROCEDURE — P9612 CATHETERIZE FOR URINE SPEC: HCPCS

## 2022-01-01 PROCEDURE — 83690 ASSAY OF LIPASE: CPT

## 2022-01-01 PROCEDURE — 36556 INSERT NON-TUNNEL CV CATH: CPT

## 2022-01-01 PROCEDURE — 93005 ELECTROCARDIOGRAM TRACING: CPT | Performed by: NURSE PRACTITIONER

## 2022-01-01 PROCEDURE — 70450 CT HEAD/BRAIN W/O DYE: CPT

## 2022-01-01 PROCEDURE — 87500 VANOMYCIN DNA AMP PROBE: CPT

## 2022-01-01 PROCEDURE — 0BH17EZ INSERTION OF ENDOTRACHEAL AIRWAY INTO TRACHEA, VIA NATURAL OR ARTIFICIAL OPENING: ICD-10-PCS | Performed by: INTERNAL MEDICINE

## 2022-01-01 PROCEDURE — 82077 ASSAY SPEC XCP UR&BREATH IA: CPT

## 2022-01-01 PROCEDURE — 87581 M.PNEUMON DNA AMP PROBE: CPT

## 2022-01-01 PROCEDURE — 2709999900 HC NON-CHARGEABLE SUPPLY: Performed by: NEUROLOGICAL SURGERY

## 2022-01-01 PROCEDURE — 78610 BRAIN FLOW IMAGING ONLY: CPT

## 2022-01-01 PROCEDURE — 2580000003 HC RX 258: Performed by: STUDENT IN AN ORGANIZED HEALTH CARE EDUCATION/TRAINING PROGRAM

## 2022-01-01 PROCEDURE — 3430000000 HC RX DIAGNOSTIC RADIOPHARMACEUTICAL: Performed by: NURSE PRACTITIONER

## 2022-01-01 PROCEDURE — 37799 UNLISTED PX VASCULAR SURGERY: CPT

## 2022-01-01 PROCEDURE — 86901 BLOOD TYPING SEROLOGIC RH(D): CPT

## 2022-01-01 PROCEDURE — 82330 ASSAY OF CALCIUM: CPT

## 2022-01-01 PROCEDURE — 87631 RESP VIRUS 3-5 TARGETS: CPT

## 2022-01-01 PROCEDURE — 83930 ASSAY OF BLOOD OSMOLALITY: CPT

## 2022-01-01 PROCEDURE — 61312 CRNEC/CRNOT STTL XDRL/SDRL: CPT | Performed by: NEUROLOGICAL SURGERY

## 2022-01-01 PROCEDURE — 2500000003 HC RX 250 WO HCPCS: Performed by: EMERGENCY MEDICINE

## 2022-01-01 PROCEDURE — 87641 MR-STAPH DNA AMP PROBE: CPT

## 2022-01-01 PROCEDURE — 3600000016 HC SURGERY LEVEL 6 ADDTL 15MIN: Performed by: NEUROLOGICAL SURGERY

## 2022-01-01 PROCEDURE — 85610 PROTHROMBIN TIME: CPT

## 2022-01-01 PROCEDURE — 86900 BLOOD TYPING SEROLOGIC ABO: CPT

## 2022-01-01 PROCEDURE — 2500000003 HC RX 250 WO HCPCS: Performed by: NURSE ANESTHETIST, CERTIFIED REGISTERED

## 2022-01-01 PROCEDURE — 99291 CRITICAL CARE FIRST HOUR: CPT | Performed by: NEUROLOGICAL SURGERY

## 2022-01-01 PROCEDURE — 82565 ASSAY OF CREATININE: CPT

## 2022-01-01 PROCEDURE — 80143 DRUG ASSAY ACETAMINOPHEN: CPT

## 2022-01-01 PROCEDURE — 6360000002 HC RX W HCPCS

## 2022-01-01 PROCEDURE — 71045 X-RAY EXAM CHEST 1 VIEW: CPT

## 2022-01-01 PROCEDURE — 83036 HEMOGLOBIN GLYCOSYLATED A1C: CPT

## 2022-01-01 PROCEDURE — 82150 ASSAY OF AMYLASE: CPT

## 2022-01-01 PROCEDURE — APPSS30 APP SPLIT SHARED TIME 16-30 MINUTES

## 2022-01-01 PROCEDURE — 3600000006 HC SURGERY LEVEL 6 BASE: Performed by: NEUROLOGICAL SURGERY

## 2022-01-01 PROCEDURE — 80053 COMPREHEN METABOLIC PANEL: CPT

## 2022-01-01 PROCEDURE — 96374 THER/PROPH/DIAG INJ IV PUSH: CPT

## 2022-01-01 PROCEDURE — 6360000004 HC RX CONTRAST MEDICATION: Performed by: EMERGENCY MEDICINE

## 2022-01-01 PROCEDURE — 89220 SPUTUM SPECIMEN COLLECTION: CPT

## 2022-01-01 PROCEDURE — 84132 ASSAY OF SERUM POTASSIUM: CPT

## 2022-01-01 PROCEDURE — 86140 C-REACTIVE PROTEIN: CPT

## 2022-01-01 PROCEDURE — 87486 CHLMYD PNEUM DNA AMP PROBE: CPT

## 2022-01-01 PROCEDURE — 87186 SC STD MICRODIL/AGAR DIL: CPT

## 2022-01-01 PROCEDURE — C1729 CATH, DRAINAGE: HCPCS | Performed by: NEUROLOGICAL SURGERY

## 2022-01-01 PROCEDURE — 2720000010 HC SURG SUPPLY STERILE: Performed by: NEUROLOGICAL SURGERY

## 2022-01-01 PROCEDURE — 70498 CT ANGIOGRAPHY NECK: CPT

## 2022-01-01 PROCEDURE — 93005 ELECTROCARDIOGRAM TRACING: CPT | Performed by: EMERGENCY MEDICINE

## 2022-01-01 PROCEDURE — 6370000000 HC RX 637 (ALT 250 FOR IP): Performed by: NEUROLOGICAL SURGERY

## 2022-01-01 PROCEDURE — 87077 CULTURE AEROBIC IDENTIFY: CPT

## 2022-01-01 PROCEDURE — 94640 AIRWAY INHALATION TREATMENT: CPT

## 2022-01-01 PROCEDURE — 99291 CRITICAL CARE FIRST HOUR: CPT

## 2022-01-01 PROCEDURE — 85384 FIBRINOGEN ACTIVITY: CPT

## 2022-01-01 PROCEDURE — 05HM33Z INSERTION OF INFUSION DEVICE INTO RIGHT INTERNAL JUGULAR VEIN, PERCUTANEOUS APPROACH: ICD-10-PCS | Performed by: INTERNAL MEDICINE

## 2022-01-01 PROCEDURE — 82248 BILIRUBIN DIRECT: CPT

## 2022-01-01 PROCEDURE — 88304 TISSUE EXAM BY PATHOLOGIST: CPT

## 2022-01-01 PROCEDURE — 70496 CT ANGIOGRAPHY HEAD: CPT

## 2022-01-01 PROCEDURE — 87635 SARS-COV-2 COVID-19 AMP PRB: CPT

## 2022-01-01 PROCEDURE — 5A1955Z RESPIRATORY VENTILATION, GREATER THAN 96 CONSECUTIVE HOURS: ICD-10-PCS | Performed by: INTERNAL MEDICINE

## 2022-01-01 PROCEDURE — 4A03X5D MEASUREMENT OF ARTERIAL FLOW, INTRACRANIAL, EXTERNAL APPROACH: ICD-10-PCS | Performed by: INTERNAL MEDICINE

## 2022-01-01 PROCEDURE — 99285 EMERGENCY DEPT VISIT HI MDM: CPT

## 2022-01-01 PROCEDURE — 87541 LEGION PNEUMO DNA AMP PROB: CPT

## 2022-01-01 PROCEDURE — 2580000003 HC RX 258: Performed by: REGISTERED NURSE

## 2022-01-01 PROCEDURE — 2500000003 HC RX 250 WO HCPCS: Performed by: STUDENT IN AN ORGANIZED HEALTH CARE EDUCATION/TRAINING PROGRAM

## 2022-01-01 PROCEDURE — 85390 FIBRINOLYSINS SCREEN I&R: CPT

## 2022-01-01 PROCEDURE — 6360000002 HC RX W HCPCS: Performed by: NURSE ANESTHETIST, CERTIFIED REGISTERED

## 2022-01-01 DEVICE — SCREW BNE L4MM DIA1.5MM CRAN SELF DRL CRSS DRV THINFLAP: Type: IMPLANTABLE DEVICE | Site: CRANIAL | Status: FUNCTIONAL

## 2022-01-01 DEVICE — PLATE BNE L15MM THK0.3MM 2 H CRANIOMAXILLOFACIAL TI STR FOR: Type: IMPLANTABLE DEVICE | Site: CRANIAL | Status: FUNCTIONAL

## 2022-01-01 DEVICE — IMPLANTABLE DEVICE: Type: IMPLANTABLE DEVICE | Site: CRANIAL | Status: FUNCTIONAL

## 2022-01-01 DEVICE — DURAGEN® SUTURABLE DURAL REGENERATION MATRIX, 2 IN X 2 IN (5 CM X 5 CM)
Type: IMPLANTABLE DEVICE | Site: BRAIN | Status: FUNCTIONAL
Brand: DURAGEN® SUTURABLE

## 2022-01-01 RX ORDER — KIT FOR THE PREPARATION OF TECHNETIUM TC 99M PENTETATE 20 MG/1
27.6 INJECTION, POWDER, LYOPHILIZED, FOR SOLUTION INTRAVENOUS; RESPIRATORY (INHALATION)
Status: COMPLETED | OUTPATIENT
Start: 2022-01-01 | End: 2022-01-01

## 2022-01-01 RX ORDER — ROCURONIUM BROMIDE 10 MG/ML
INJECTION, SOLUTION INTRAVENOUS DAILY PRN
Status: COMPLETED | OUTPATIENT
Start: 2022-01-01 | End: 2022-01-01

## 2022-01-01 RX ORDER — SODIUM CHLORIDE 9 MG/ML
INJECTION, SOLUTION INTRAVENOUS CONTINUOUS
Status: DISCONTINUED | OUTPATIENT
Start: 2022-01-01 | End: 2022-01-01

## 2022-01-01 RX ORDER — DESMOPRESSIN ACETATE 4 UG/ML
1 INJECTION, SOLUTION INTRAVENOUS; SUBCUTANEOUS ONCE
Status: COMPLETED | OUTPATIENT
Start: 2022-01-01 | End: 2022-01-01

## 2022-01-01 RX ORDER — SODIUM CHLORIDE 450 MG/100ML
INJECTION, SOLUTION INTRAVENOUS CONTINUOUS
Status: DISCONTINUED | OUTPATIENT
Start: 2022-01-01 | End: 2022-01-01

## 2022-01-01 RX ORDER — PROPOFOL 10 MG/ML
5-50 INJECTION, EMULSION INTRAVENOUS CONTINUOUS
Status: DISCONTINUED | OUTPATIENT
Start: 2022-01-01 | End: 2022-01-01

## 2022-01-01 RX ORDER — LABETALOL 20 MG/4 ML (5 MG/ML) INTRAVENOUS SYRINGE
20 ONCE
Status: COMPLETED | OUTPATIENT
Start: 2022-01-01 | End: 2022-01-01

## 2022-01-01 RX ORDER — DOPAMINE HYDROCHLORIDE 160 MG/100ML
1-20 INJECTION, SOLUTION INTRAVENOUS CONTINUOUS
Status: DISCONTINUED | OUTPATIENT
Start: 2022-01-01 | End: 2022-01-01

## 2022-01-01 RX ORDER — 3% SODIUM CHLORIDE 3 G/100ML
100 INJECTION, SOLUTION INTRAVENOUS ONCE
Status: COMPLETED | OUTPATIENT
Start: 2022-01-01 | End: 2022-01-01

## 2022-01-01 RX ORDER — PROPOFOL 10 MG/ML
INJECTION, EMULSION INTRAVENOUS
Status: COMPLETED
Start: 2022-01-01 | End: 2022-01-01

## 2022-01-01 RX ORDER — MANNITOL 20 G/100ML
INJECTION, SOLUTION INTRAVENOUS PRN
Status: DISCONTINUED | OUTPATIENT
Start: 2022-01-01 | End: 2022-01-01 | Stop reason: SDUPTHER

## 2022-01-01 RX ORDER — MORPHINE SULFATE 2 MG/ML
2 INJECTION, SOLUTION INTRAMUSCULAR; INTRAVENOUS
Status: DISCONTINUED | OUTPATIENT
Start: 2022-01-01 | End: 2022-01-01 | Stop reason: SDUPTHER

## 2022-01-01 RX ORDER — SODIUM CHLORIDE 9 MG/ML
INJECTION, SOLUTION INTRAVENOUS CONTINUOUS PRN
Status: DISCONTINUED | OUTPATIENT
Start: 2022-01-01 | End: 2022-01-01 | Stop reason: SDUPTHER

## 2022-01-01 RX ORDER — FENTANYL CITRATE 50 UG/ML
INJECTION, SOLUTION INTRAMUSCULAR; INTRAVENOUS PRN
Status: DISCONTINUED | OUTPATIENT
Start: 2022-01-01 | End: 2022-01-01 | Stop reason: SDUPTHER

## 2022-01-01 RX ORDER — SODIUM CHLORIDE 0.9 % (FLUSH) 0.9 %
5-40 SYRINGE (ML) INJECTION PRN
Status: DISCONTINUED | OUTPATIENT
Start: 2022-01-01 | End: 2022-01-01 | Stop reason: SDUPTHER

## 2022-01-01 RX ORDER — CEFAZOLIN SODIUM 1 G/3ML
INJECTION, POWDER, FOR SOLUTION INTRAMUSCULAR; INTRAVENOUS PRN
Status: DISCONTINUED | OUTPATIENT
Start: 2022-01-01 | End: 2022-01-01 | Stop reason: SDUPTHER

## 2022-01-01 RX ORDER — PROPOFOL 10 MG/ML
INJECTION, EMULSION INTRAVENOUS PRN
Status: DISCONTINUED | OUTPATIENT
Start: 2022-01-01 | End: 2022-01-01 | Stop reason: SDUPTHER

## 2022-01-01 RX ORDER — MORPHINE SULFATE 4 MG/ML
4 INJECTION, SOLUTION INTRAMUSCULAR; INTRAVENOUS
Status: DISCONTINUED | OUTPATIENT
Start: 2022-01-01 | End: 2022-01-01 | Stop reason: SDUPTHER

## 2022-01-01 RX ORDER — DESMOPRESSIN ACETATE 4 UG/ML
1 INJECTION, SOLUTION INTRAVENOUS; SUBCUTANEOUS ONCE
Status: DISCONTINUED | OUTPATIENT
Start: 2022-01-01 | End: 2022-01-01

## 2022-01-01 RX ORDER — SODIUM CHLORIDE 9 MG/ML
INJECTION, SOLUTION INTRAVENOUS CONTINUOUS
Status: DISCONTINUED | OUTPATIENT
Start: 2022-01-01 | End: 2022-01-01 | Stop reason: SDUPTHER

## 2022-01-01 RX ORDER — POTASSIUM CHLORIDE 29.8 MG/ML
20 INJECTION INTRAVENOUS PRN
Status: DISCONTINUED | OUTPATIENT
Start: 2022-01-01 | End: 2022-07-07 | Stop reason: HOSPADM

## 2022-01-01 RX ORDER — MAGNESIUM SULFATE IN WATER 40 MG/ML
2000 INJECTION, SOLUTION INTRAVENOUS PRN
Status: DISCONTINUED | OUTPATIENT
Start: 2022-01-01 | End: 2022-07-07 | Stop reason: HOSPADM

## 2022-01-01 RX ORDER — SODIUM CHLORIDE 9 MG/ML
INJECTION, SOLUTION INTRAVENOUS PRN
Status: DISCONTINUED | OUTPATIENT
Start: 2022-01-01 | End: 2022-01-01 | Stop reason: SDUPTHER

## 2022-01-01 RX ORDER — POTASSIUM CHLORIDE 29.8 MG/ML
20 INJECTION INTRAVENOUS
Status: COMPLETED | OUTPATIENT
Start: 2022-01-01 | End: 2022-01-01

## 2022-01-01 RX ORDER — ROCURONIUM BROMIDE 10 MG/ML
INJECTION, SOLUTION INTRAVENOUS PRN
Status: DISCONTINUED | OUTPATIENT
Start: 2022-01-01 | End: 2022-01-01 | Stop reason: SDUPTHER

## 2022-01-01 RX ORDER — 3% SODIUM CHLORIDE 3 G/100ML
25 INJECTION, SOLUTION INTRAVENOUS CONTINUOUS
Status: DISPENSED | OUTPATIENT
Start: 2022-01-01 | End: 2022-01-01

## 2022-01-01 RX ORDER — ALBUMIN (HUMAN) 12.5 G/50ML
25 SOLUTION INTRAVENOUS ONCE
Status: COMPLETED | OUTPATIENT
Start: 2022-01-01 | End: 2022-01-01

## 2022-01-01 RX ORDER — SODIUM CHLORIDE 9 MG/ML
INJECTION, SOLUTION INTRAVENOUS PRN
Status: DISCONTINUED | OUTPATIENT
Start: 2022-01-01 | End: 2022-07-07 | Stop reason: HOSPADM

## 2022-01-01 RX ORDER — GINSENG 100 MG
CAPSULE ORAL PRN
Status: DISCONTINUED | OUTPATIENT
Start: 2022-01-01 | End: 2022-01-01 | Stop reason: ALTCHOICE

## 2022-01-01 RX ORDER — SODIUM CHLORIDE 0.9 % (FLUSH) 0.9 %
5-40 SYRINGE (ML) INJECTION EVERY 12 HOURS SCHEDULED
Status: DISCONTINUED | OUTPATIENT
Start: 2022-01-01 | End: 2022-01-01 | Stop reason: SDUPTHER

## 2022-01-01 RX ORDER — PANTOPRAZOLE SODIUM 40 MG/10ML
40 INJECTION, POWDER, LYOPHILIZED, FOR SOLUTION INTRAVENOUS 2 TIMES DAILY
Status: DISCONTINUED | OUTPATIENT
Start: 2022-01-01 | End: 2022-07-07 | Stop reason: HOSPADM

## 2022-01-01 RX ORDER — POTASSIUM CHLORIDE 29.8 MG/ML
40 INJECTION INTRAVENOUS ONCE
Status: DISCONTINUED | OUTPATIENT
Start: 2022-01-01 | End: 2022-01-01 | Stop reason: SDUPTHER

## 2022-01-01 RX ORDER — NOREPINEPHRINE BIT/0.9 % NACL 16MG/250ML
1-100 INFUSION BOTTLE (ML) INTRAVENOUS CONTINUOUS
Status: DISCONTINUED | OUTPATIENT
Start: 2022-01-01 | End: 2022-07-07 | Stop reason: HOSPADM

## 2022-01-01 RX ORDER — ETOMIDATE 2 MG/ML
INJECTION INTRAVENOUS DAILY PRN
Status: COMPLETED | OUTPATIENT
Start: 2022-01-01 | End: 2022-01-01

## 2022-01-01 RX ORDER — 0.9 % SODIUM CHLORIDE 0.9 %
1000 INTRAVENOUS SOLUTION INTRAVENOUS ONCE
Status: DISCONTINUED | OUTPATIENT
Start: 2022-01-01 | End: 2022-01-01

## 2022-01-01 RX ORDER — MANNITOL 20 G/100ML
1 INJECTION, SOLUTION INTRAVENOUS ONCE
Status: DISCONTINUED | OUTPATIENT
Start: 2022-01-01 | End: 2022-01-01

## 2022-01-01 RX ORDER — DESMOPRESSIN ACETATE 4 UG/ML
1 INJECTION, SOLUTION INTRAVENOUS; SUBCUTANEOUS 2 TIMES DAILY
Status: DISCONTINUED | OUTPATIENT
Start: 2022-01-01 | End: 2022-01-01

## 2022-01-01 RX ORDER — MORPHINE SULFATE 4 MG/ML
4 INJECTION, SOLUTION INTRAMUSCULAR; INTRAVENOUS
Status: DISCONTINUED | OUTPATIENT
Start: 2022-01-01 | End: 2022-07-07 | Stop reason: HOSPADM

## 2022-01-01 RX ORDER — MANNITOL 250 MG/ML
1 INJECTION, SOLUTION INTRAVENOUS ONCE
Status: DISCONTINUED | OUTPATIENT
Start: 2022-01-01 | End: 2022-01-01

## 2022-01-01 RX ORDER — SODIUM CHLORIDE, SODIUM LACTATE, POTASSIUM CHLORIDE, CALCIUM CHLORIDE 600; 310; 30; 20 MG/100ML; MG/100ML; MG/100ML; MG/100ML
INJECTION, SOLUTION INTRAVENOUS CONTINUOUS
Status: DISCONTINUED | OUTPATIENT
Start: 2022-01-01 | End: 2022-01-01

## 2022-01-01 RX ORDER — SODIUM CHLORIDE, SODIUM LACTATE, POTASSIUM CHLORIDE, AND CALCIUM CHLORIDE .6; .31; .03; .02 G/100ML; G/100ML; G/100ML; G/100ML
1000 INJECTION, SOLUTION INTRAVENOUS ONCE
Status: COMPLETED | OUTPATIENT
Start: 2022-01-01 | End: 2022-07-06

## 2022-01-01 RX ORDER — DOPAMINE HYDROCHLORIDE 160 MG/100ML
INJECTION, SOLUTION INTRAVENOUS
Status: COMPLETED
Start: 2022-01-01 | End: 2022-01-01

## 2022-01-01 RX ORDER — LORAZEPAM 2 MG/ML
2 INJECTION INTRAMUSCULAR EVERY 30 MIN PRN
Status: DISCONTINUED | OUTPATIENT
Start: 2022-01-01 | End: 2022-01-01

## 2022-01-01 RX ORDER — 3% SODIUM CHLORIDE 3 G/100ML
20 INJECTION, SOLUTION INTRAVENOUS CONTINUOUS
Status: DISPENSED | OUTPATIENT
Start: 2022-01-01 | End: 2022-01-01

## 2022-01-01 RX ORDER — 3% SODIUM CHLORIDE 3 G/100ML
20 INJECTION, SOLUTION INTRAVENOUS CONTINUOUS
Status: ACTIVE | OUTPATIENT
Start: 2022-01-01 | End: 2022-01-01

## 2022-01-01 RX ORDER — MIDAZOLAM HYDROCHLORIDE 1 MG/ML
INJECTION INTRAMUSCULAR; INTRAVENOUS PRN
Status: DISCONTINUED | OUTPATIENT
Start: 2022-01-01 | End: 2022-01-01 | Stop reason: SDUPTHER

## 2022-01-01 RX ORDER — 3% SODIUM CHLORIDE 3 G/100ML
200 INJECTION, SOLUTION INTRAVENOUS CONTINUOUS
Status: DISCONTINUED | OUTPATIENT
Start: 2022-01-01 | End: 2022-01-01

## 2022-01-01 RX ORDER — ATROPINE SULFATE 0.1 MG/ML
1 INJECTION INTRAVENOUS
Status: COMPLETED | OUTPATIENT
Start: 2022-01-01 | End: 2022-01-01

## 2022-01-01 RX ORDER — MORPHINE SULFATE 2 MG/ML
2 INJECTION, SOLUTION INTRAMUSCULAR; INTRAVENOUS
Status: DISCONTINUED | OUTPATIENT
Start: 2022-01-01 | End: 2022-01-01

## 2022-01-01 RX ORDER — MANNITOL 20 G/100ML
80 INJECTION, SOLUTION INTRAVENOUS ONCE
Status: COMPLETED | OUTPATIENT
Start: 2022-01-01 | End: 2022-01-01

## 2022-01-01 RX ORDER — PROPOFOL 10 MG/ML
INJECTION, EMULSION INTRAVENOUS CONTINUOUS PRN
Status: DISCONTINUED | OUTPATIENT
Start: 2022-01-01 | End: 2022-01-01 | Stop reason: SDUPTHER

## 2022-01-01 RX ORDER — SODIUM CHLORIDE 0.9 % (FLUSH) 0.9 %
5-40 SYRINGE (ML) INJECTION PRN
Status: DISCONTINUED | OUTPATIENT
Start: 2022-01-01 | End: 2022-07-07 | Stop reason: HOSPADM

## 2022-01-01 RX ORDER — SODIUM CHLORIDE 0.9 % (FLUSH) 0.9 %
5-40 SYRINGE (ML) INJECTION EVERY 12 HOURS SCHEDULED
Status: DISCONTINUED | OUTPATIENT
Start: 2022-01-01 | End: 2022-07-07 | Stop reason: HOSPADM

## 2022-01-01 RX ORDER — POTASSIUM CHLORIDE 7.45 MG/ML
10 INJECTION INTRAVENOUS PRN
Status: DISCONTINUED | OUTPATIENT
Start: 2022-01-01 | End: 2022-07-07 | Stop reason: HOSPADM

## 2022-01-01 RX ORDER — DEXTROSE MONOHYDRATE 50 MG/ML
INJECTION, SOLUTION INTRAVENOUS CONTINUOUS
Status: DISCONTINUED | OUTPATIENT
Start: 2022-01-01 | End: 2022-07-06

## 2022-01-01 RX ORDER — 0.9 % SODIUM CHLORIDE 0.9 %
500 INTRAVENOUS SOLUTION INTRAVENOUS ONCE
Status: COMPLETED | OUTPATIENT
Start: 2022-01-01 | End: 2022-01-01

## 2022-01-01 RX ORDER — MAGNESIUM SULFATE IN WATER 40 MG/ML
2000 INJECTION, SOLUTION INTRAVENOUS ONCE
Status: COMPLETED | OUTPATIENT
Start: 2022-01-01 | End: 2022-01-01

## 2022-01-01 RX ORDER — PHENYLEPHRINE HYDROCHLORIDE 10 MG/ML
INJECTION INTRAVENOUS PRN
Status: DISCONTINUED | OUTPATIENT
Start: 2022-01-01 | End: 2022-01-01 | Stop reason: SDUPTHER

## 2022-01-01 RX ORDER — IPRATROPIUM BROMIDE AND ALBUTEROL SULFATE 2.5; .5 MG/3ML; MG/3ML
1 SOLUTION RESPIRATORY (INHALATION) EVERY 4 HOURS
Status: DISCONTINUED | OUTPATIENT
Start: 2022-01-01 | End: 2022-07-07

## 2022-01-01 RX ADMIN — IPRATROPIUM BROMIDE AND ALBUTEROL SULFATE 1 AMPULE: .5; 3 SOLUTION RESPIRATORY (INHALATION) at 16:53

## 2022-01-01 RX ADMIN — DEXTROSE MONOHYDRATE 15 MG/HR: 50 INJECTION, SOLUTION INTRAVENOUS at 02:32

## 2022-01-01 RX ADMIN — PANTOPRAZOLE SODIUM 40 MG: 40 INJECTION, POWDER, LYOPHILIZED, FOR SOLUTION INTRAVENOUS at 10:45

## 2022-01-01 RX ADMIN — CEFAZOLIN 2000 MG: 1 INJECTION, POWDER, FOR SOLUTION INTRAMUSCULAR; INTRAVENOUS at 08:28

## 2022-01-01 RX ADMIN — CEFAZOLIN 2000 MG: 10 INJECTION, POWDER, FOR SOLUTION INTRAVENOUS at 13:14

## 2022-01-01 RX ADMIN — SODIUM CHLORIDE 200 ML: 4.5 INJECTION, SOLUTION INTRAVENOUS at 22:51

## 2022-01-01 RX ADMIN — DOPAMINE HYDROCHLORIDE 5 MCG/KG/MIN: 160 INJECTION, SOLUTION INTRAVENOUS at 01:44

## 2022-01-01 RX ADMIN — SODIUM CHLORIDE: 9 INJECTION, SOLUTION INTRAVENOUS at 19:19

## 2022-01-01 RX ADMIN — CEFAZOLIN 2000 MG: 1 INJECTION, POWDER, FOR SOLUTION INTRAMUSCULAR; INTRAVENOUS at 12:28

## 2022-01-01 RX ADMIN — SODIUM CHLORIDE: 4.5 INJECTION, SOLUTION INTRAVENOUS at 23:54

## 2022-01-01 RX ADMIN — SODIUM CHLORIDE, PRESERVATIVE FREE 10 ML: 5 INJECTION INTRAVENOUS at 19:59

## 2022-01-01 RX ADMIN — FENTANYL CITRATE 100 MCG: 50 INJECTION, SOLUTION INTRAMUSCULAR; INTRAVENOUS at 08:29

## 2022-01-01 RX ADMIN — CEFAZOLIN 2000 MG: 10 INJECTION, POWDER, FOR SOLUTION INTRAVENOUS at 20:37

## 2022-01-01 RX ADMIN — DESMOPRESSIN ACETATE 1 MCG: 4 SOLUTION INTRAVENOUS at 04:01

## 2022-01-01 RX ADMIN — ALTEPLASE 1 MG: 2.2 INJECTION, POWDER, LYOPHILIZED, FOR SOLUTION INTRAVENOUS at 11:23

## 2022-01-01 RX ADMIN — DEXTROSE MONOHYDRATE 15 MG/HR: 50 INJECTION, SOLUTION INTRAVENOUS at 11:59

## 2022-01-01 RX ADMIN — PROPOFOL 50 MG: 10 INJECTION, EMULSION INTRAVENOUS at 16:47

## 2022-01-01 RX ADMIN — CEFAZOLIN 2000 MG: 10 INJECTION, POWDER, FOR SOLUTION INTRAVENOUS at 22:20

## 2022-01-01 RX ADMIN — ROCURONIUM BROMIDE 20 MG: 50 INJECTION, SOLUTION INTRAVENOUS at 11:09

## 2022-01-01 RX ADMIN — DEXTROSE MONOHYDRATE 2.5 MG/HR: 50 INJECTION, SOLUTION INTRAVENOUS at 02:03

## 2022-01-01 RX ADMIN — PANTOPRAZOLE SODIUM 40 MG: 40 INJECTION, POWDER, LYOPHILIZED, FOR SOLUTION INTRAVENOUS at 09:28

## 2022-01-01 RX ADMIN — PHENYLEPHRINE HYDROCHLORIDE 200 MCG: 10 INJECTION INTRAVENOUS at 16:34

## 2022-01-01 RX ADMIN — CEFAZOLIN 2000 MG: 10 INJECTION, POWDER, FOR SOLUTION INTRAVENOUS at 19:50

## 2022-01-01 RX ADMIN — IOPAMIDOL 80 ML: 755 INJECTION, SOLUTION INTRAVENOUS at 11:45

## 2022-01-01 RX ADMIN — POTASSIUM CHLORIDE 20 MEQ: 29.8 INJECTION, SOLUTION INTRAVENOUS at 07:50

## 2022-01-01 RX ADMIN — PANTOPRAZOLE SODIUM 40 MG: 40 INJECTION, POWDER, LYOPHILIZED, FOR SOLUTION INTRAVENOUS at 19:48

## 2022-01-01 RX ADMIN — DEXTROSE MONOHYDRATE 15 MG/HR: 50 INJECTION, SOLUTION INTRAVENOUS at 10:05

## 2022-01-01 RX ADMIN — LEVETIRACETAM 1000 MG: 100 INJECTION, SOLUTION INTRAVENOUS at 12:00

## 2022-01-01 RX ADMIN — DEXTROSE MONOHYDRATE 12.5 MG/HR: 50 INJECTION, SOLUTION INTRAVENOUS at 22:34

## 2022-01-01 RX ADMIN — SODIUM CHLORIDE, PRESERVATIVE FREE 10 ML: 5 INJECTION INTRAVENOUS at 08:02

## 2022-01-01 RX ADMIN — ETOMIDATE 20 MG: 2 INJECTION INTRAVENOUS at 11:52

## 2022-01-01 RX ADMIN — PROPOFOL 20 MCG/KG/MIN: 10 INJECTION, EMULSION INTRAVENOUS at 12:20

## 2022-01-01 RX ADMIN — LEVETIRACETAM 1500 MG: 100 INJECTION, SOLUTION INTRAVENOUS at 13:49

## 2022-01-01 RX ADMIN — SODIUM CHLORIDE, POTASSIUM CHLORIDE, SODIUM LACTATE AND CALCIUM CHLORIDE: 600; 310; 30; 20 INJECTION, SOLUTION INTRAVENOUS at 15:52

## 2022-01-01 RX ADMIN — SODIUM CHLORIDE 20 ML/HR: 3 INJECTION, SOLUTION INTRAVENOUS at 13:39

## 2022-01-01 RX ADMIN — POTASSIUM CHLORIDE 20 MEQ: 29.8 INJECTION, SOLUTION INTRAVENOUS at 05:19

## 2022-01-01 RX ADMIN — POTASSIUM CHLORIDE 20 MEQ: 29.8 INJECTION, SOLUTION INTRAVENOUS at 00:57

## 2022-01-01 RX ADMIN — FENTANYL CITRATE 100 MCG: 50 INJECTION, SOLUTION INTRAMUSCULAR; INTRAVENOUS at 09:11

## 2022-01-01 RX ADMIN — PANTOPRAZOLE SODIUM 40 MG: 40 INJECTION, POWDER, LYOPHILIZED, FOR SOLUTION INTRAVENOUS at 08:02

## 2022-01-01 RX ADMIN — DEXTROSE MONOHYDRATE: 50 INJECTION, SOLUTION INTRAVENOUS at 08:56

## 2022-01-01 RX ADMIN — LEVETIRACETAM 1500 MG: 100 INJECTION, SOLUTION INTRAVENOUS at 13:41

## 2022-01-01 RX ADMIN — POTASSIUM CHLORIDE 20 MEQ: 29.8 INJECTION, SOLUTION INTRAVENOUS at 07:00

## 2022-01-01 RX ADMIN — DEXTROSE MONOHYDRATE 15 MG/HR: 50 INJECTION, SOLUTION INTRAVENOUS at 15:16

## 2022-01-01 RX ADMIN — MANNITOL 80 G: 20 INJECTION, SOLUTION INTRAVENOUS at 09:20

## 2022-01-01 RX ADMIN — CEFAZOLIN 2000 MG: 10 INJECTION, POWDER, FOR SOLUTION INTRAVENOUS at 05:24

## 2022-01-01 RX ADMIN — LEVETIRACETAM 1000 MG: 100 INJECTION, SOLUTION INTRAVENOUS at 00:29

## 2022-01-01 RX ADMIN — SODIUM CHLORIDE: 9 INJECTION, SOLUTION INTRAVENOUS at 05:16

## 2022-01-01 RX ADMIN — ATROPINE SULFATE 1 MG: 0.1 INJECTION, SOLUTION ENDOTRACHEAL; INTRAMUSCULAR; INTRAVENOUS; SUBCUTANEOUS at 00:45

## 2022-01-01 RX ADMIN — POTASSIUM CHLORIDE 20 MEQ: 29.8 INJECTION, SOLUTION INTRAVENOUS at 21:19

## 2022-01-01 RX ADMIN — PROPOFOL 30 MCG/KG/MIN: 10 INJECTION, EMULSION INTRAVENOUS at 14:12

## 2022-01-01 RX ADMIN — SODIUM CHLORIDE, PRESERVATIVE FREE 10 ML: 5 INJECTION INTRAVENOUS at 20:31

## 2022-01-01 RX ADMIN — PHENYLEPHRINE HYDROCHLORIDE 50 MCG: 10 INJECTION INTRAVENOUS at 16:14

## 2022-01-01 RX ADMIN — DEXTROSE MONOHYDRATE 15 MG/HR: 50 INJECTION, SOLUTION INTRAVENOUS at 06:32

## 2022-01-01 RX ADMIN — PROPOFOL 20 MCG/KG/MIN: 10 INJECTION, EMULSION INTRAVENOUS at 10:55

## 2022-01-01 RX ADMIN — FENTANYL CITRATE 100 MCG: 50 INJECTION, SOLUTION INTRAMUSCULAR; INTRAVENOUS at 13:49

## 2022-01-01 RX ADMIN — PIPERACILLIN AND TAZOBACTAM 3375 MG: 3; .375 INJECTION, POWDER, LYOPHILIZED, FOR SOLUTION INTRAVENOUS at 17:20

## 2022-01-01 RX ADMIN — MIDAZOLAM 5 MG: 1 INJECTION INTRAMUSCULAR; INTRAVENOUS at 14:14

## 2022-01-01 RX ADMIN — SODIUM CHLORIDE, POTASSIUM CHLORIDE, SODIUM LACTATE AND CALCIUM CHLORIDE 1000 ML: 600; 310; 30; 20 INJECTION, SOLUTION INTRAVENOUS at 23:00

## 2022-01-01 RX ADMIN — DEXTROSE MONOHYDRATE 15 MG/HR: 50 INJECTION, SOLUTION INTRAVENOUS at 13:27

## 2022-01-01 RX ADMIN — KIT FOR THE PREPARATION OF TECHNETIUM TC 99M PENTETATE 27.6 MILLICURIE: 20 INJECTION, POWDER, LYOPHILIZED, FOR SOLUTION INTRAVENOUS; RESPIRATORY (INHALATION) at 08:00

## 2022-01-01 RX ADMIN — CEFAZOLIN 2000 MG: 10 INJECTION, POWDER, FOR SOLUTION INTRAVENOUS at 12:03

## 2022-01-01 RX ADMIN — SODIUM CHLORIDE: 4.5 INJECTION, SOLUTION INTRAVENOUS at 16:38

## 2022-01-01 RX ADMIN — ROCURONIUM BROMIDE 20 MG: 50 INJECTION, SOLUTION INTRAVENOUS at 10:05

## 2022-01-01 RX ADMIN — IOPAMIDOL 80 ML: 755 INJECTION, SOLUTION INTRAVENOUS at 21:52

## 2022-01-01 RX ADMIN — ROCURONIUM BROMIDE 30 MG: 50 INJECTION, SOLUTION INTRAVENOUS at 09:04

## 2022-01-01 RX ADMIN — SODIUM CHLORIDE 25 ML/HR: 3 INJECTION, SOLUTION INTRAVENOUS at 15:42

## 2022-01-01 RX ADMIN — CEFAZOLIN SODIUM 2000 MG: 1 INJECTION, POWDER, FOR SOLUTION INTRAMUSCULAR; INTRAVENOUS at 16:18

## 2022-01-01 RX ADMIN — SODIUM CHLORIDE, PRESERVATIVE FREE 10 ML: 5 INJECTION INTRAVENOUS at 10:45

## 2022-01-01 RX ADMIN — ROCURONIUM BROMIDE 100 MG: 10 INJECTION INTRAVENOUS at 11:55

## 2022-01-01 RX ADMIN — LEVETIRACETAM 1500 MG: 100 INJECTION, SOLUTION INTRAVENOUS at 02:07

## 2022-01-01 RX ADMIN — PHENYLEPHRINE HYDROCHLORIDE 200 MCG: 10 INJECTION INTRAVENOUS at 16:23

## 2022-01-01 RX ADMIN — PROPOFOL 30 MCG/KG/MIN: 10 INJECTION, EMULSION INTRAVENOUS at 10:06

## 2022-01-01 RX ADMIN — POTASSIUM CHLORIDE 20 MEQ: 29.8 INJECTION, SOLUTION INTRAVENOUS at 06:34

## 2022-01-01 RX ADMIN — SODIUM CHLORIDE, PRESERVATIVE FREE 10 ML: 5 INJECTION INTRAVENOUS at 09:26

## 2022-01-01 RX ADMIN — DOPAMINE HYDROCHLORIDE IN DEXTROSE 5 MCG/KG/MIN: 1.6 INJECTION, SOLUTION INTRAVENOUS at 01:44

## 2022-01-01 RX ADMIN — PROPOFOL 30 MCG/KG/MIN: 10 INJECTION, EMULSION INTRAVENOUS at 01:20

## 2022-01-01 RX ADMIN — CEFAZOLIN 2000 MG: 10 INJECTION, POWDER, FOR SOLUTION INTRAVENOUS at 03:22

## 2022-01-01 RX ADMIN — DEXTROSE MONOHYDRATE: 50 INJECTION, SOLUTION INTRAVENOUS at 03:19

## 2022-01-01 RX ADMIN — LEVETIRACETAM 1000 MG: 100 INJECTION, SOLUTION INTRAVENOUS at 01:57

## 2022-01-01 RX ADMIN — ROCURONIUM BROMIDE 30 MG: 50 INJECTION, SOLUTION INTRAVENOUS at 14:02

## 2022-01-01 RX ADMIN — TRANEXAMIC ACID 1000 MG: 1 INJECTION, SOLUTION INTRAVENOUS at 15:19

## 2022-01-01 RX ADMIN — FENTANYL CITRATE 100 MCG: 50 INJECTION, SOLUTION INTRAMUSCULAR; INTRAVENOUS at 16:02

## 2022-01-01 RX ADMIN — IPRATROPIUM BROMIDE AND ALBUTEROL SULFATE 1 AMPULE: .5; 3 SOLUTION RESPIRATORY (INHALATION) at 20:52

## 2022-01-01 RX ADMIN — TRANEXAMIC ACID 1000 MG: 1 INJECTION, SOLUTION INTRAVENOUS at 15:27

## 2022-01-01 RX ADMIN — MAGNESIUM SULFATE HEPTAHYDRATE 2000 MG: 40 INJECTION, SOLUTION INTRAVENOUS at 03:11

## 2022-01-01 RX ADMIN — SODIUM CHLORIDE 500 ML: 9 INJECTION, SOLUTION INTRAVENOUS at 01:00

## 2022-01-01 RX ADMIN — LEVETIRACETAM 4500 MG: 100 INJECTION, SOLUTION INTRAVENOUS at 13:03

## 2022-01-01 RX ADMIN — SODIUM CHLORIDE: 9 INJECTION, SOLUTION INTRAVENOUS at 22:18

## 2022-01-01 RX ADMIN — DEXTROSE MONOHYDRATE: 50 INJECTION, SOLUTION INTRAVENOUS at 13:43

## 2022-01-01 RX ADMIN — DEXTROSE MONOHYDRATE 15 MG/HR: 50 INJECTION, SOLUTION INTRAVENOUS at 18:29

## 2022-01-01 RX ADMIN — DESMOPRESSIN ACETATE 1 MCG: 4 SOLUTION INTRAVENOUS at 14:03

## 2022-01-01 RX ADMIN — LEVETIRACETAM 1000 MG: 100 INJECTION, SOLUTION INTRAVENOUS at 00:42

## 2022-01-01 RX ADMIN — DEXTROSE MONOHYDRATE: 50 INJECTION, SOLUTION INTRAVENOUS at 23:29

## 2022-01-01 RX ADMIN — LEVETIRACETAM 2000 MG: 100 INJECTION, SOLUTION INTRAVENOUS at 13:43

## 2022-01-01 RX ADMIN — DEXTROSE MONOHYDRATE 15 MG/HR: 50 INJECTION, SOLUTION INTRAVENOUS at 16:47

## 2022-01-01 RX ADMIN — MANNITOL 50 G: 20 INJECTION, SOLUTION INTRAVENOUS at 10:04

## 2022-01-01 RX ADMIN — SODIUM CHLORIDE: 9 INJECTION, SOLUTION INTRAVENOUS at 10:31

## 2022-01-01 RX ADMIN — SODIUM CHLORIDE, PRESERVATIVE FREE 10 ML: 5 INJECTION INTRAVENOUS at 21:33

## 2022-01-01 RX ADMIN — CEFAZOLIN 2000 MG: 10 INJECTION, POWDER, FOR SOLUTION INTRAVENOUS at 05:16

## 2022-01-01 RX ADMIN — POTASSIUM CHLORIDE 20 MEQ: 29.8 INJECTION, SOLUTION INTRAVENOUS at 23:25

## 2022-01-01 RX ADMIN — PHENYLEPHRINE HYDROCHLORIDE 100 MCG: 10 INJECTION INTRAVENOUS at 16:18

## 2022-01-01 RX ADMIN — SODIUM CHLORIDE, PRESERVATIVE FREE 10 ML: 5 INJECTION INTRAVENOUS at 19:48

## 2022-01-01 RX ADMIN — POTASSIUM CHLORIDE 20 MEQ: 29.8 INJECTION, SOLUTION INTRAVENOUS at 01:42

## 2022-01-01 RX ADMIN — ALTEPLASE 2 MG: 2.2 INJECTION, POWDER, LYOPHILIZED, FOR SOLUTION INTRAVENOUS at 11:40

## 2022-01-01 RX ADMIN — PANTOPRAZOLE SODIUM 40 MG: 40 INJECTION, POWDER, LYOPHILIZED, FOR SOLUTION INTRAVENOUS at 19:59

## 2022-01-01 RX ADMIN — CEFAZOLIN 2000 MG: 10 INJECTION, POWDER, FOR SOLUTION INTRAVENOUS at 12:35

## 2022-01-01 RX ADMIN — CEFAZOLIN 2000 MG: 10 INJECTION, POWDER, FOR SOLUTION INTRAVENOUS at 04:59

## 2022-01-01 RX ADMIN — POTASSIUM CHLORIDE 20 MEQ: 29.8 INJECTION, SOLUTION INTRAVENOUS at 20:32

## 2022-01-01 RX ADMIN — DEXTROSE MONOHYDRATE: 50 INJECTION, SOLUTION INTRAVENOUS at 18:21

## 2022-01-01 RX ADMIN — Medication 2 MCG/MIN: at 01:40

## 2022-01-01 RX ADMIN — PANTOPRAZOLE SODIUM 40 MG: 40 INJECTION, POWDER, LYOPHILIZED, FOR SOLUTION INTRAVENOUS at 00:15

## 2022-01-01 RX ADMIN — SODIUM CHLORIDE, PRESERVATIVE FREE 10 ML: 5 INJECTION INTRAVENOUS at 22:14

## 2022-01-01 RX ADMIN — SODIUM CHLORIDE: 9 INJECTION, SOLUTION INTRAVENOUS at 15:55

## 2022-01-01 RX ADMIN — SODIUM CHLORIDE, PRESERVATIVE FREE 10 ML: 5 INJECTION INTRAVENOUS at 08:50

## 2022-01-01 RX ADMIN — ROCURONIUM BROMIDE 70 MG: 50 INJECTION, SOLUTION INTRAVENOUS at 08:12

## 2022-01-01 RX ADMIN — SODIUM CHLORIDE 15 ML/HR: 3 INJECTION, SOLUTION INTRAVENOUS at 23:43

## 2022-01-01 RX ADMIN — DESMOPRESSIN ACETATE 1 MCG: 4 SOLUTION INTRAVENOUS at 08:50

## 2022-01-01 RX ADMIN — PANTOPRAZOLE SODIUM 40 MG: 40 INJECTION, POWDER, LYOPHILIZED, FOR SOLUTION INTRAVENOUS at 08:49

## 2022-01-01 RX ADMIN — PROPOFOL 30 MCG/KG/MIN: 10 INJECTION, EMULSION INTRAVENOUS at 19:30

## 2022-01-01 RX ADMIN — DEXTROSE MONOHYDRATE 15 MG/HR: 50 INJECTION, SOLUTION INTRAVENOUS at 04:22

## 2022-01-01 RX ADMIN — DESMOPRESSIN ACETATE 1 MCG: 4 SOLUTION INTRAVENOUS at 00:49

## 2022-01-01 RX ADMIN — SODIUM CHLORIDE: 4.5 INJECTION, SOLUTION INTRAVENOUS at 10:04

## 2022-01-01 RX ADMIN — DEXTROSE MONOHYDRATE 12.5 MG/HR: 50 INJECTION, SOLUTION INTRAVENOUS at 15:35

## 2022-01-01 RX ADMIN — PHENYLEPHRINE HYDROCHLORIDE 200 MCG: 10 INJECTION INTRAVENOUS at 16:21

## 2022-01-01 RX ADMIN — LABETALOL 20 MG/4 ML (5 MG/ML) INTRAVENOUS SYRINGE 20 MG: at 12:15

## 2022-01-01 RX ADMIN — CEFAZOLIN 2000 MG: 10 INJECTION, POWDER, FOR SOLUTION INTRAVENOUS at 21:32

## 2022-01-01 RX ADMIN — SODIUM CHLORIDE, POTASSIUM CHLORIDE, SODIUM LACTATE AND CALCIUM CHLORIDE: 600; 310; 30; 20 INJECTION, SOLUTION INTRAVENOUS at 08:25

## 2022-01-01 RX ADMIN — DEXTROSE MONOHYDRATE 15 MG/HR: 50 INJECTION, SOLUTION INTRAVENOUS at 20:26

## 2022-01-01 RX ADMIN — CALCIUM GLUCONATE 1000 MG: 98 INJECTION, SOLUTION INTRAVENOUS at 01:05

## 2022-01-01 RX ADMIN — DEXTROSE MONOHYDRATE 5 MG/HR: 50 INJECTION, SOLUTION INTRAVENOUS at 12:56

## 2022-01-01 RX ADMIN — SODIUM CHLORIDE 100 ML: 3 INJECTION, SOLUTION INTRAVENOUS at 12:40

## 2022-01-01 RX ADMIN — DEXTROSE MONOHYDRATE 12.5 MG/HR: 50 INJECTION, SOLUTION INTRAVENOUS at 00:39

## 2022-01-01 RX ADMIN — DEXTROSE MONOHYDRATE 15 MG/HR: 50 INJECTION, SOLUTION INTRAVENOUS at 08:21

## 2022-01-01 RX ADMIN — LABETALOL 20 MG/4 ML (5 MG/ML) INTRAVENOUS SYRINGE 20 MG: at 11:43

## 2022-01-01 RX ADMIN — LEVETIRACETAM 1500 MG: 100 INJECTION, SOLUTION INTRAVENOUS at 01:16

## 2022-01-01 RX ADMIN — PROPOFOL 30 MCG/KG/MIN: 10 INJECTION, EMULSION INTRAVENOUS at 01:49

## 2022-01-01 RX ADMIN — ALBUMIN (HUMAN) 25 G: 0.25 INJECTION, SOLUTION INTRAVENOUS at 20:20

## 2022-01-01 RX ADMIN — PROPOFOL 25 MCG/KG/MIN: 10 INJECTION, EMULSION INTRAVENOUS at 21:02

## 2022-01-01 RX ADMIN — DEXTROSE MONOHYDRATE: 50 INJECTION, SOLUTION INTRAVENOUS at 00:28

## 2022-01-01 ASSESSMENT — PULMONARY FUNCTION TESTS
PIF_VALUE: 29
PIF_VALUE: 27
PIF_VALUE: 30
PIF_VALUE: 33
PIF_VALUE: 25
PIF_VALUE: 29
PIF_VALUE: 27
PIF_VALUE: 23
PIF_VALUE: 27
PIF_VALUE: 29
PIF_VALUE: 31
PIF_VALUE: 29
PIF_VALUE: 25
PIF_VALUE: 24
PIF_VALUE: 26
PIF_VALUE: 25
PIF_VALUE: 30
PIF_VALUE: 33
PIF_VALUE: 25
PIF_VALUE: 29
PIF_VALUE: 28
PIF_VALUE: 24
PIF_VALUE: 26
PIF_VALUE: 23
PIF_VALUE: 26
PIF_VALUE: 29

## 2022-01-01 ASSESSMENT — PAIN - FUNCTIONAL ASSESSMENT: PAIN_FUNCTIONAL_ASSESSMENT: NONE - DENIES PAIN

## 2022-06-30 PROBLEM — D18.02 INTRACRANIAL HEMANGIOMA (HCC): Status: ACTIVE | Noted: 2022-01-01

## 2022-06-30 NOTE — CONSULTS
[address]                                          NEUROSURGICALCONSULTATION NOTE       Basim Feng   YOB: 1965  Account Number: [de-identified]   Date of Examination: 6/30/2022    ASSESSMENT:  - This is a 60year old Female with rapid decline in patient's neurological status who underwent brain CT that showed right sided basal ganglia hemorrhage associated with intraventricular hemorrhage, hydrocephalus and 8 mm midline shift.    - brain compression.  - GCS:3T  - No corneal Reflex.   - No cough reflex. - Patient is intubated, sedated and paralysed. PLAN:    - Admit to ICU. - Medical management per ICU team.  - Coagulation profile. - Keep systolic blood pressure between 160 and 100.   - Seizure precaution. - 3% hypertonic saline.  -A dose of TXA to be given to the patient. - EVD placement: Based on patient current medical status, the findings of her brain CT and after thorough discussion with other treatment team(ICU team), I would recommend for the patient surgical intervention in the form of right-sided bur hole for right-sided EVD placement as a lifesaving procedure. This recommended surgical intervention was discussed in detail with patient's family in front of the ICU NP Aydee Latif). There is risk with benefits and realistic expectations were discussed with patient's family also. All questions and concerns were were answered and addressed. Patient's elected to proceed with the recommended surgical intervention.   The plan now for the patient is to place for her right-sided EVD as soon as possible.  -After placement the EVD: The goal of the treatment from neurosurgical prospective:     ·  Keep the ICP less than  20 mm HG  · Keep systolic blood pressure more than 100  mm HG  · Keep Cerebral perfusion pressure (CPP)  more than 60 mm HG  · Keep blood glucose from  mg    -The case was discussed in detail with the ER team and with the ICU team.      *Time spent was at least 35 min of critical time evaluating patient, discussion with staff, planning for treatment and evaluation. The time was spent examining patient face to face, reviewing the images personally, reviewing the chart, perform high complexity decision making and speaking with the nursing staff regarding recommendations. There was a high probability of clinically significant life-threatening deterioration of the patient's condition requiring my urgent intervention. HISTORY OF PRESENT ILLNESS:  Moni Garcia is a 60 y.o. female, admitted on :6/30/2022 11:28 AM    This  is a pleasant 60year old  female who was brought  to the emergency department from home for altered mental status. Per report, she is from [place] [address] and in town to help take care of her mother who has dementia. Last night she began feeling nauseated and had a few episodes of vomiting. They assumed that she had food poisoning. This morning family went to check on her she was on the floor un responsive. Upon patient arrival to the ER, she was intubated. She had high blood pressure. she had episode of seizure. Patient underwent brain CT that showed:      A 4.8 x 3.4 cm area of intraparenchymal hemorrhage is seen in the right basal ganglion. There is 8 mm midline shift to the left at the level of the septum pellucidum. Intraventricular lobe is also seen. Mild hydrocephalus is noted. There is sulci    effacement. For this reason neurosurgery team was consulted    ROS:    Review of Systems     I was not able to obtained a comprehensive ROS because of pt level of consciousness     PROBLEM LIST:  There is no problem list on file for this patient.       MEDICATIONS:   Prior to Admission medications    Not on File       Current Facility-Administered Medications   Medication Dose Route Frequency Provider Last Rate Last Admin    0.9 % sodium chloride bolus  1,000 mL IntraVENous Once Ryland Craft MD  rocuronium [facility]) injection    Daily PRN Cricket Carrero MD   100 mg at 06/30/22 1155    etomidate (AMIDATE) injection    Daily PRN Cricket Carrero MD   20 mg at 06/30/22 1152    levETIRAcetam (KEPPRA) 4,500 mg in sodium chloride 0.9 % 250 mL IVPB  4,500 mg IntraVENous Once [employer], PA-C        [START ON 7/1/2022] levETIRAcetam (KEPPRA) 1,000 mg in sodium chloride 0.9 % 100 mL IVPB  1,000 mg IntraVENous Q12H [employer], PA-C         No current outpatient medications on file. rocuronium, , Daily PRN  etomidate, , Daily PRN              ALLERGIES:   Patient has no allergy information on record. PAST MEDICAL  HISTORY:    has no past medical history on file. PAST SURGICAL  HISTORY:    has no past surgical history on file. SOCIAL HISTORY:   Social History     Tobacco Use    Smoking status: Not on file    Smokeless tobacco: Not on file   Substance Use Topics    Alcohol use: Not on file       FAMILY HISTORY:  No family history on file. LABS  CBC: No results found for: WBC, RBC, HGB, HCT, MCV, MCH, MCHC, RDW, PLT, MPV  CMP:  No results found for: NA, K, CL, CO2, BUN, CREATININE, GFRAA, AGRATIO, LABGLOM, GLUCOSE, GLU, PROT, LABALBU, CALCIUM, BILITOT, ALKPHOS, AST, ALT  PTT:  No results found for: APTT, PTT[APTT}    RADIOLOGY:  Pertinent images have been reviewed. Brain CT showed:    A 4.8 x 3.4 cm area of intraparenchymal hemorrhage is seen in the right basal ganglion. There is 8 mm midline shift to the left at the level of the septum pellucidum. Intraventricular lobe is also seen. Mild hydrocephalus is noted. There is sulci    effacement. EXAMINATION:  Intubated  Sedated and paralysed  GCS : 3T   Pupils equal and slugish to light.    Has cough reflex  Has corneal Reflex      Rukhsana Lopez MD,MD  Electronically signed 6/30/2022

## 2022-06-30 NOTE — BRIEF OP NOTE
Brief Postoperative Note      Patient: Jono Dodson  YOB: 1965  MRN: [id number]    Date of Procedure: 6/30/2022    Pre-Op Diagnosis: Altered mental status, unspecified altered mental status type [R41.82]    Post-Op Diagnosis: Same       Procedure(s):  EXTERNAL VENTRICULAR DRAIN PLACEMENT    Surgeon(s):  Alejandro Dodson MD    Assistant:  Physician Assistant: Nazia Pena PA-C    Anesthesia: General    Estimated Blood Loss (mL): Minimal    Complications: None    Specimens:   * No specimens in log *    Implants:  * No implants in log *      Drains:   NG/OG/NJ/NE Tube Orogastric Left mouth (Active)   Securement device Adhesive based vazquez 06/30/22 1355   Status Suction-low intermittent 06/30/22 1355   Placement Verified X-Ray (Initial); Gastric Contents 06/30/22 1355   NG/OG/NJ/NE External Measurement (cm) 58 cm 06/30/22 1355   Drainage Appearance Bile 06/30/22 1355       Urinary Catheter (Active)   Catheter Indications Need for fluid volume management of the critically ill patient in a critical care setting 06/30/22 1355   Urine Color Diluted 06/30/22 1355   Urine Appearance Clear 06/30/22 1355   Collection Container Standard 06/30/22 1355       Findings: Cranial hemorrhage post EVD placement    Electronically signed by Nazia Pena PA-C on 6/30/2022 at 4:57 PM

## 2022-06-30 NOTE — ANESTHESIA POSTPROCEDURE EVALUATION
Department of Anesthesiology  Postprocedure Note    Patient: Jenny Milligan  MRN: [id number]  YOB: 1965  Date of evaluation: 6/30/2022      Procedure Summary     Date: 06/30/22 Room / Location: [address] SAMANTHA Morrison    Anesthesia Start: 5212 Anesthesia Stop: 4848    Procedure: EXTERNAL VENTRICULAR DRAIN PLACEMENT (N/A Head) Diagnosis:       Altered mental status, unspecified altered mental status type      (Altered mental status, unspecified altered mental status type [R41.82])    Surgeons: Chaya Calabrese MD Responsible Provider: Elsa Chang MD    Anesthesia Type: general ASA Status: 3          Anesthesia Type: No value filed.     Dottie Phase I:      Dottie Phase II:        Anesthesia Post Evaluation    Patient location during evaluation: ICU  Patient participation: complete - patient cannot participate  Level of consciousness: sedated and ventilated  Airway patency: patent  Nausea & Vomiting: no vomiting and no nausea  Cardiovascular status: hemodynamically stable  Respiratory status: acceptable and ventilator  Hydration status: stable

## 2022-06-30 NOTE — PROGRESS NOTES
This Neuro Nurse Navigator spoke with pt sister, Trish Mccoy, and pt brother, Jose L William. They state that pt was last seen at baseline at approximately 1730 after she had went out to eat. When she got home she had vomited a couple of times and had fallen on the floor. Per sister the fall was unwitnessed. At that time pt other sister went to check on her and states Fuad Boles was laying on the floor\". This morning at approximately 1000 pt sister, Trish Mccoy, went to check on her and she was still laying on the floor between the bed and the dresser and she had foam coming out of her mouth and was moaning but would not talk then her whole body started jerking. \" Trish Mccoy states that is when she called 911.

## 2022-06-30 NOTE — CONSULTS
Neurology Consult Note    Date:6/30/2022       YOZG:[id number]  Patient Name:Dottie Ceballos     YOB: 1965     Age:56 y.o. Requesting Physician: Isauro Phipps MD     Reason for Consult:  Evaluate for stroke alert      Chief Complaint:   Chief Complaint   Patient presents with    Altered Mental Status       Subjective     Alphonso Clemons is a 60 y.o. female with no known history who presents as a stroke alert activation. Last evening around 530pm the patient was seen normal and went out to eat at that time. When she came back, she was noted to be vomiting and ended up falling to the floor last evening, but the fall was unwitnessed. However, her sister did end up checking on her that evening and the patient was laying on the floor. They checked on her again this morning and she was still laying on the floor. They noted that she was drooling and her body was shaking. At that time, they called 911. The patient was given narcan in route with no response. On arrival, she was taken for a head CT and was noted to have a large intraparenchymal hemorrhage with extension into her ventricular system. She underwent a CTA which was negative for aneurysm. She was subsequently intubated in the emergency department for airway protection. While in the ER, she was noted to have increased tone and myoclonic movements of her right upper extremity. There were also reports of seizure activity per EMS. The patient is currently in the ICU intubated on propofol. There has been no further seizure activity and the tone in the right upper extremity is normal. The patient is moving her right upper extremity spontaneously, but is not following commands.      Review of Systems   Review of Systems   Unable to perform ROS: Intubated     Medications   Scheduled Meds:    sodium chloride  1,000 mL IntraVENous Once    [START ON 7/1/2022] levETIRAcetam  1,000 mg IntraVENous Q12H    tranexamic acid (CYCLOKAPRON) IVPB  1,000 mg IntraVENous Once    Followed by    tranexamic acid (CYCLOKAPRON) IVPB  1,000 mg IntraVENous Once     Continuous Infusions:    niCARdipine 12.5 mg/hr (22 1408)    propofol 25 mcg/kg/min (22 1304)    sodium chloride       PRN Meds:   Medications Prior to Admission:   No current facility-administered medications on file prior to encounter. No current outpatient medications on file prior to encounter. Past History    Past Medical History:   has no past medical history on file. Social History:        Family History: No family history on file. Physical Examination      Vitals:  BP (!) 176/98   Pulse 94   Temp 98.3 °F (36.8 °C)   Resp 20   Ht 5' 5\" (1.651 m)   Wt 171 lb 1.2 oz (77.6 kg)   SpO2 100%   BMI 28.47 kg/m²   Temp (24hrs), Av.3 °F (36.8 °C), Min:98.3 °F (36.8 °C), Max:98.3 °F (36.8 °C)    Intracerebral Hemorrhage Score  [place] Coma Scale = 8 1   ICH Volume >= 30 mL. 34.3mL 1   Intraventricular Hemorrhage 1   Infratentorial Origin of Hemorrhage 0   Age => 80 0   TOTAL 3     30 day mortality  Score 0 = 0%  Score 1 = 13%  Score 2 = 26%  Score 3 = 72%  Score 4 = 97%  Score 5 = 100%  Score 6 = 100%    Modified Al Scale: 0      I/O (24Hr): No intake or output data in the 24 hours ending 22 1510      Physical Exam  Vitals reviewed. Constitutional:       General: She is not in acute distress. Appearance: She is ill-appearing. HENT:      Head: Normocephalic and atraumatic. Right Ear: External ear normal.      Left Ear: External ear normal.      Nose: Nose normal.      Mouth/Throat:      Mouth: Mucous membranes are moist.      Pharynx: No oropharyngeal exudate or posterior oropharyngeal erythema. Cardiovascular:      Rate and Rhythm: Normal rate and regular rhythm. Heart sounds: Normal heart sounds. No murmur heard. Pulmonary:      Effort: No respiratory distress. Breath sounds: Rhonchi present. No wheezing.       Comments: intubated  Abdominal: General: Bowel sounds are normal. There is no distension. Palpations: Abdomen is soft. There is no mass. Musculoskeletal:      Right lower leg: No edema. Left lower leg: No edema. Skin:     General: Skin is warm. Findings: No rash. Psychiatric:      Comments: Intubated and sedated       Neurologic Exam     Mental Status   Level of consciousness: responsive to painful stimuli    Cranial Nerves     CN III, IV, VI   Right pupil: Size: 2 mm. Reactivity: non-reactive. Left pupil: Size: 2 mm. Reactivity: non-reactive. No adventitious motor movements     Motor Exam Patient moving right upper extremity spontaneously. Withdrawal to noxious stimuli in bilateral lower extremities. No withdrawal to noxious stimuli in left upper extremity. Labs/Imaging/Diagnostics   Labs:  CBC:  Recent Labs     06/30/22  1145   WBC 11.6*   RBC 5.85*   HGB 18.4*   HCT 56.8*   MCV 97.1        CHEMISTRIES:  Recent Labs     06/30/22  1145      K 3.9      CO2 23   BUN 16   CREATININE 1.2   GLUCOSE 152*     COAGULATION STUDIES:  Recent Labs     06/30/22  1145   INR 1.08     LIVER PROFILE:  Recent Labs     06/30/22  1145   AST 33   ALT 19   BILITOT 0.5   ALKPHOS 95     CHOLESTEROL AND A1C:No results for input(s): LDLCALC, HDL, CHOL, TRIG, LABA1C in the last 720 hours. Imaging Last 24 Hours:  CTA HEAD W WO CONTRAST (CODE STROKE)    Result Date: 6/30/2022  WET READ: No hemodynamically significant stenosis is seen in the intracranial and extracranial arteries. See the CT of the head for further details about the findings in the head. Bilateral upper lobe infiltrates, right greater than left. This examination will be formally read by one of the neuro radiologists later today. Please refer to that report. Sania Earing Dr Lacinda Brittle Isibor ON 6/30/2022 12:12 PM. **This report has been created using voice recognition software.   It may contain minor errors which are inherent in voice recognition technology. ** PROCEDURE: CTA HEAD W WO CONTRAST, CTA NECK W WO CONTRAST CLINICAL INFORMATION: bleed. COMPARISON: CT scan of the brain obtained on the same day. . TECHNIQUE: 1 mm axial images were obtained through the head and neck after the fast bolus administration of contrast. A noncontrast localizer was obtained. 3-D reconstructions were performed on a dedicated 3-D workstation. These include multiplanar MPR images and multiplanar MIP images. Centerline reconstructions were obtained of the carotid systems. Isovue intravenous contrast was given. All carotid artery measurements are performed utilizing Nascet criteria. This exam was analyzed using [employer]. [employer] CT LVO. All CT scans at this facility use dose modulation, iterative reconstruction, and/or weight-based dosing when appropriate to reduce radiation dose to as low as reasonably achievable. FINDINGS: CTA NECK:  Aortic arch and branches: Normal origin of the brachiocephalic, left common carotid and left subclavian arteries from the aortic arch Right common carotid artery/ICA: Punctate calcification at the origin of the right internal carotid artery. No significant hemodynamic stenosis involving the right common and internal carotid arteries. Left common carotid artery/ICA: No significant hemodynamic stenosis involving the left common and internal carotid arteries. Vertebral arteries: Antegrade flow in the right and left vertebral arteries CTA HEAD: Internal carotid arteries: Antegrade flow in the right and left internal carotid arteries. Middle cerebral arteries: Antegrade flow in the middle cerebral arteries bilaterally. Anterior cerebral arteries: Antegrade flow in the anterior cerebral arteries bilaterally. . Vertebral arteries: Antegrade flow in the right and left vertebral arteries Basilar artery: Antegrade flow in the basilar artery.  Superior cerebellar arteries: Antegrade flow in the right and left superior cerebellar hemispheres Posterior cerebral arteries: Antegrade flow in the posterior cerebral arteries bilaterally. Patent posterior communicating artery on the right side. No aneurysms, stenoses or occlusions are noted. The superior sagittal sinus, vein of Duncan, internal cerebral veins, straight sinus, transverse sinuses and sigmoid sinuses are patent. Axial source data: Extensive hemorrhage within the right basal ganglia, right and left lateral ventricles, third and fourth ventricles seen best on the noncontrast CT scan. Areas of abnormal density in the right and left lung fields. Slight heterogeneity involving the right and left lobes of the thyroid gland. 1. Essentially negative CTA of the head and neck. 2. Extensive hemorrhage within the right basal ganglia, lateral, third and fourth ventricles seen best on the noncontrast CT scan of the brain. 3. Areas of abnormal density in the right and left lung fields. 4. Slight heterogeneity involving the right and left lobes of the thyroid gland. **This report has been created using voice recognition software. It may contain minor errors which are inherent in voice recognition technology. ** Final report electronically signed by DR Ashley Salazar on 6/30/2022 12:40 PM    CT Head WO Contrast    Result Date: 6/30/2022  PROCEDURE: CT HEAD WO CONTRAST CLINICAL INFORMATION:altered mental status COMPARISON: None TECHNIQUE: 5 mm axial imaging through the head without IV contrast. All CT scans at this facility use dose modulation, iterative reconstruction, and/or weight based dosing when appropriate to reduce the radiation dose to as low as reasonably achievable. FINDINGS: A 4.8 x 3.4 cm area of intraparenchymal hemorrhage is seen in the right basal ganglion. There is 8 mm midline shift to the left at the level of the septum pellucidum. Intraventricular lobe is also seen. Mild hydrocephalus is noted. There is sulci effacement. The posterior fossa is unremarkable. The craniocervical junction is unremarkable.  No acute bony abnormality. The  paranasal sinuses are clear. The  mastoid air cells are clear. The orbits are unremarkable. Marked atrophy of the pituitary gland with empty sella appearance. 1. A 4.8 x 3.4 cm intraparenchymal hemorrhage in the right basal ganglion is noted. 8 mm midline shift to the left at the level of the septum pellucidum. Mild hydrocephalus is seen. Intraventricular blood is also noted. The critical  finding(s) was called to Dr. Fidelia Casey at 1200 hours on 6/30/2022 by Dr. Jim Andrews. Verbal acknowledgment and readback was given. **This report has been created using voice recognition software. It may contain minor errors which are inherent in voice recognition technology. ** Final report electronically signed by Dr Jessica Vasquez on 6/30/2022 12:07 PM    CTA NECK W WO CONTRAST    Result Date: 6/30/2022  WET READ: No hemodynamically significant stenosis is seen in the intracranial and extracranial arteries. See the CT of the head for further details about the findings in the head. Bilateral upper lobe infiltrates, right greater than left. This examination will be formally read by one of the neuro radiologists later today. Please refer to that report. Jason Newsome ON 6/30/2022 12:12 PM. **This report has been created using voice recognition software. It may contain minor errors which are inherent in voice recognition technology. ** PROCEDURE: CTA HEAD W WO CONTRAST, CTA NECK W WO CONTRAST CLINICAL INFORMATION: bleed. COMPARISON: CT scan of the brain obtained on the same day. . TECHNIQUE: 1 mm axial images were obtained through the head and neck after the fast bolus administration of contrast. A noncontrast localizer was obtained. 3-D reconstructions were performed on a dedicated 3-D workstation. These include multiplanar MPR images and multiplanar MIP images. Centerline reconstructions were obtained of the carotid systems.  Isovue intravenous contrast was given. All carotid artery measurements are performed utilizing Nascet criteria. This exam was analyzed using [facility].  contact CT LVO. All CT scans at this facility use dose modulation, iterative reconstruction, and/or weight-based dosing when appropriate to reduce radiation dose to as low as reasonably achievable. FINDINGS: CTA NECK:  Aortic arch and branches: Normal origin of the brachiocephalic, left common carotid and left subclavian arteries from the aortic arch Right common carotid artery/ICA: Punctate calcification at the origin of the right internal carotid artery. No significant hemodynamic stenosis involving the right common and internal carotid arteries. Left common carotid artery/ICA: No significant hemodynamic stenosis involving the left common and internal carotid arteries. Vertebral arteries: Antegrade flow in the right and left vertebral arteries CTA HEAD: Internal carotid arteries: Antegrade flow in the right and left internal carotid arteries. Middle cerebral arteries: Antegrade flow in the middle cerebral arteries bilaterally. Anterior cerebral arteries: Antegrade flow in the anterior cerebral arteries bilaterally. . Vertebral arteries: Antegrade flow in the right and left vertebral arteries Basilar artery: Antegrade flow in the basilar artery. Superior cerebellar arteries: Antegrade flow in the right and left superior cerebellar hemispheres Posterior cerebral arteries: Antegrade flow in the posterior cerebral arteries bilaterally. Patent posterior communicating artery on the right side. No aneurysms, stenoses or occlusions are noted. The superior sagittal sinus, vein of Duncan, internal cerebral veins, straight sinus, transverse sinuses and sigmoid sinuses are patent. Axial source data: Extensive hemorrhage within the right basal ganglia, right and left lateral ventricles, third and fourth ventricles seen best on the noncontrast CT scan. Areas of abnormal density in the right and left lung fields. bronchus. The endotracheal tube should be withdrawn about 2 cm. The critical  finding(s) was called to Jasmin Hendricks RN in the ER at 1242 hours on 6/30/2022 by Dr. Marbin Wilder. Verbal acknowledgment and readback was given. 2. Diffuse airspace disease is seen bilaterally. Findings can relate to multilobar pneumonia or pulmonary edema. 3. The nasogastric tube terminates below the diaphragm likely within the stomach. 4. Probable small bilateral pleural effusions. **This report has been created using voice recognition software. It may contain minor errors which are inherent in voice recognition technology. ** Final report electronically signed by Dr Arnoldo Owen on 6/30/2022 12:43 PM      Assessment and Plan:        1. Hemorrhagic stroke with intraventricular extension originating from the right basal ganglia. Focal seizures. · CT head shows large intraparenchymal hemorrhage originating from right basal ganglia with extension into the ventricular system with evidence of moderate hydrocephalus  · CTA without evidence of aneurysm  · Systolic blood pressure goal 100-140  · We will defer sodium goal to neurosurgery team  · Keppra 4.5 g load given in emergency department. Continue Keppra 1 g twice daily. · IV Ativan as needed for acute seizures. Order placed for Ativan 2 mg as needed. Call with any additional seizure activity. · Patient going for emergent EVD with neurosurgery  · Neurology following        This patient was seen and evaluated with Dr. Marshall Sofia and he is in agreement with the assessment and plan. Electronically signed by David Saucedo PA-C on 6/30/22 at 5:24 PM EDT      I had a face-to-face encounter with this patient today where I evaluated the patient with YOSHI Suero. The history and physical examination was completed by me as documented in the attached note. All relevant radiology images, labs and vitals signs were reviewed by me.  I agree with the physical examination, assessment, and plan as documented which was formulated by me. The timing of this note filing does not necessarily correlate with the timing of when the patient was seen by me. 57-year-old woman who presents with a rapid decline in her neurologic status, unresponsiveness, foaming at the mouth and possibly seizing, last known well time was yesterday. Stat CT of the head showed a large right basal ganglia hemorrhage with intraventricular extension, casting of the fourth ventricle, 8 mm midline shift and hydrocephalus. CT angiogram of the head and neck was negative for cerebral aneurysm. Given her evolving brain compression, she will require an urgent extraventricular drain with possible hematoma evacuation. She was loaded with 4.5 g of Keppra in the emergency department, we will plan on continuing Keppra 1 g twice daily. At some point once she is more stable we will obtain a routine EEG. Agree with neurosurgery consult for EVD placement and possible craniectomy. We will follow for seizure management. Other recommendations as above.     Jovita Zuniga MD  Vascular and Interventional Neurology, Neurocritical Care  [address] [facility]

## 2022-06-30 NOTE — PLAN OF CARE
Problem: Respiratory - Adult  Goal: Achieves optimal ventilation and oxygenation  Outcome: Progressing   Patient started on ventilator, tolerating well. Will continue to monitor respiratory status.

## 2022-06-30 NOTE — ED PROVIDER NOTES
[facility] ICU 4D    EMERGENCY MEDICINE     Pt Name: Cindy Sarkar  MRN: [id number]  [place] 1965  Date of evaluation: 6/30/2022  Provider: Erica Maier MD,     [address] [place] [address]       Chief Complaint   Patient presents with    Altered Mental Status       HISTORY OF PRESENT ILLNESS    Cindy Sarkar is a pleasant 60 y.o. female who presents to the emergency department from home for altered mental status. History was provided by the sister who is at the bedside and EMS. Her sister states that she is from [facility] and in [facility] to help take care of their mother who has dementia. Last evening she began feeling nauseated and had a few episodes of vomiting. They assumed that she had food poisoning. This morning when they went to check on her she was on the floor and just moaning. They were unable to get her to respond. They called EMS. Sister is unaware if she has any medical history such as high blood pressure, diabetes, or if she is on any blood thinners. EMS gave the patient 2 mg of Narcan in route with no response from the patient. EMS also noted that she had \"convulsions \"in route. Sister denies any previous history of seizures that she is aware of. Triage notes and Nursing notes were reviewed by myself. Any discrepancies are addressed above. PAST MEDICAL HISTORY   No past medical history on file. SURGICAL HISTORY     No past surgical history on file. CURRENT MEDICATIONS       There are no discharge medications for this patient. ALLERGIES     Patient has no known allergies. FAMILY HISTORY     No family history on file.      SOCIAL HISTORY       Social History     Socioeconomic History    Marital status: Single     Spouse name: Not on file    Number of children: Not on file    Years of education: Not on file    Highest education level: Not on file   Occupational History    Not on file   Tobacco Use    Smoking status: Not on file    Smokeless tobacco: Not on file   Substance and Sexual Activity    Alcohol use: Not on file    Drug use: Not on file    Sexual activity: Not on file   Other Topics Concern    Not on file   Social History Narrative    Not on file     Social Determinants of Health     Financial Resource Strain:     Difficulty of Paying Living Expenses: Not on file   Food Insecurity:     Worried About Running Out of Food in the Last Year: Not on file    Andrey of Food in the Last Year: Not on file   Transportation Needs:     Lack of Transportation (Medical): Not on file    Lack of Transportation (Non-Medical): Not on file   Physical Activity:     Days of Exercise per Week: Not on file    Minutes of Exercise per Session: Not on file   Stress:     Feeling of Stress : Not on file   Social Connections:     Frequency of Communication with Friends and Family: Not on file    Frequency of Social Gatherings with Friends and Family: Not on file    Attends Islam Services: Not on file    Active Member of [address] [place] [address] or Organizations: Not on file    Attends Club or Organization Meetings: Not on file    Marital Status: Not on file   Intimate Partner Violence:     Fear of Current or Ex-Partner: Not on file    Emotionally Abused: Not on file    Physically Abused: Not on file    Sexually Abused: Not on file   Housing Stability:     Unable to Pay for Housing in the Last Year: Not on file    Number of [place] in the Last Year: Not on file    Unstable Housing in the Last Year: Not on file       REVIEW OF SYSTEMS     Review of Systems   Unable to perform ROS: Acuity of condition       Except as noted above the remainder of the review of systems was reviewed and is.    PHYSICAL EXAM    (up to 7 for level 4, 8 or more for level 5)     ED Triage Vitals   BP Temp Temp src Heart Rate Resp SpO2 Height Weight   06/30/22 1133 06/30/22 1129 -- 06/30/22 1129 06/30/22 1129 06/30/22 1129 06/30/22 1222 06/30/22 1153   (!) 192/141 98.3 °F (36.8 °C)  (!) 117 30 92 % 5' 5\" (1.651 m) 180 lb (81.6 kg)       Physical Exam  Vitals and nursing note reviewed. Exam conducted with a chaperone present. Constitutional:       General: She is in acute distress. Appearance: She is normal weight. She is ill-appearing. HENT:      Head: Normocephalic and atraumatic. Nose: Nose normal. No congestion. Mouth/Throat:      Mouth: Mucous membranes are moist.      Pharynx: Oropharynx is clear. No oropharyngeal exudate or posterior oropharyngeal erythema. Eyes:      Pupils: Pupils are equal.      Right eye: Pupil is round and reactive. Left eye: Pupil is round and reactive. Comments: Pupils 1 mm   Cardiovascular:      Rate and Rhythm: Normal rate and regular rhythm. Pulses: Normal pulses. Heart sounds: Normal heart sounds. No murmur heard. No friction rub. Pulmonary:      Effort: Pulmonary effort is normal. No respiratory distress. Breath sounds: Normal breath sounds. No wheezing. Abdominal:      General: Abdomen is flat. There is no distension. Palpations: Abdomen is soft. Tenderness: There is no rebound. Skin:     General: Skin is warm and dry. Capillary Refill: Capillary refill takes less than 2 seconds. Neurological:      General: No focal deficit present. Mental Status: She is unresponsive. GCS: GCS eye subscore is 3. GCS verbal subscore is 2. GCS motor subscore is 5. Comments: Patient has moved all extremities. She localized pain to sternal rub. She also removed both lower extremities with touching of the bottom of her feet. DIAGNOSTIC RESULTS     EKG:(none if blank)  All EKG's are interpreted by [facility] who either signs or Co-signs this chart in the absence of a cardiologist.        RADIOLOGY: (none if blank)   Interpretation per the Radiologistbelow, if available at the time of this note:    CT HEAD WO CONTRAST   Final Result   1.  Large right-sided intraparenchymal hematoma and large hematoma throughout the ventricular system with mild leftward midline shift and    mild ventriculomegaly, similar to the prior study. 2. There is a new ventriculostomy tube overlying the foramina of [place]. There is a small amount of gas within the right lateral ventricle. 3. There is severe cerebral edema, similar to the prior study. This document has been electronically signed by: Luis Espinoza MD on    06/30/2022 06:01 PM      All CTs at this facility use dose modulation techniques and iterative    reconstructions, and/or weight-based dosing   when appropriate to reduce radiation to a low as reasonably achievable. XR CHEST PORTABLE   Final Result   1. Lines and tubes as above. 2. Bilateral atelectasis/infiltrate. 3. Small bilateral pleural effusions. **This report has been created using voice recognition software. It may contain minor errors which are inherent in voice recognition technology. **      Final report electronically signed by Dr. Howard Mosqueda on 6/30/2022 1:15 PM      XR CHEST PORTABLE   Final Result   1. The tip of the endotracheal tube is in the right mainstem bronchus. The endotracheal tube should be withdrawn about 2 cm. The critical  finding(s) was called to Timothy Brady RN in the ER at 1242 hours on 6/30/2022 by Dr. Katrina Fong. Verbal    acknowledgment and readback was given. 2. Diffuse airspace disease is seen bilaterally. Findings can relate to multilobar pneumonia or pulmonary edema. 3. The nasogastric tube terminates below the diaphragm likely within the stomach. 4. Probable small bilateral pleural effusions. **This report has been created using voice recognition software. It may contain minor errors which are inherent in voice recognition technology. **      Final report electronically signed by Dr Alisha Parr on 6/30/2022 12:43 PM      CTA HEAD W WO CONTRAST (CODE STROKE)   Final Result       1.  Essentially negative CTA of the head and neck.   2. Extensive hemorrhage within the right basal ganglia, lateral, third and fourth ventricles seen best on the noncontrast CT scan of the brain. 3. Areas of abnormal density in the right and left lung fields. 4. Slight heterogeneity involving the right and left lobes of the thyroid gland. **This report has been created using voice recognition software. It may contain minor errors which are inherent in voice recognition technology. **      Final report electronically signed by DR Vandana Scott on 6/30/2022 12:40 PM      CTA [address]   Final Result       1. Essentially negative CTA of the head and neck. 2. Extensive hemorrhage within the right basal ganglia, lateral, third and fourth ventricles seen best on the noncontrast CT scan of the brain. 3. Areas of abnormal density in the right and left lung fields. 4. Slight heterogeneity involving the right and left lobes of the thyroid gland. **This report has been created using voice recognition software. It may contain minor errors which are inherent in voice recognition technology. **      Final report electronically signed by DR Vandana Scott on 6/30/2022 12:40 PM      CT Head WO Contrast   Final Result   1. A 4.8 x 3.4 cm intraparenchymal hemorrhage in the right basal ganglion is noted. 8 mm midline shift to the left at the level of the septum pellucidum. Mild hydrocephalus is seen. Intraventricular blood is also noted. The critical  finding(s) was called to Dr. Faith Del Toro at 1200 hours on 6/30/2022 by Dr. Sowmya Du. Verbal acknowledgment and readback was given. **This report has been created using voice recognition software. It may contain minor errors which are inherent in voice recognition technology. **      Final report electronically signed by Dr Christen Melendez on 6/30/2022 12:07 PM      CT HEAD WO CONTRAST    (Results Pending)       LABS:  Labs Reviewed   CBC WITH AUTO DIFFERENTIAL - Abnormal; within normal limits   SODIUM - Abnormal; Notable for the following components:    Sodium 148 (*)     All other components within normal limits   POCT CREATININE - URINE - Abnormal; Notable for the following components:    POC CREATININE WHOLE BLOOD 1.4 (*)     All other components within normal limits   VRE SCREEN BY PCR   PNEUMONIA PANEL, MOLECULAR   CULTURE, URINE   CULTURE, RESPIRATORY    Narrative:     Source: endotracheal tube       Site:           Current Antibiotics: not stated   CULTURE, RESPIRATORY   PNEUMONIA PANEL, MOLECULAR   TROPONIN   LIPASE   PROTIME-INR   ETHANOL   ACETAMINOPHEN LEVEL   URINE DRUG SCREEN   OSMOLALITY   MRSA BY PCR   TEG GLOBAL HEMOSTASIS WITH LYSIS   SODIUM   HEMOGLOBIN A1C   LACTIC ACID   LACTIC ACID   LACTIC ACID   SODIUM   SODIUM   SODIUM   SODIUM   SODIUM   BASIC METABOLIC PANEL   CBC WITH AUTO DIFFERENTIAL   CBC WITH AUTO DIFFERENTIAL   SODIUM   SODIUM   TYPE AND SCREEN       All other labs were within normal range or not returned as of this dictation. Please note, any cultures that may have been sent were not resulted at the time of this patient visit. EMERGENCY DEPARTMENT COURSE andMedical Decision Making:     MDM  Number of Diagnoses or Management Options  Intracranial hemorrhage ([facility] [address]  Diagnosis management comments: 57-year-old female presents emergency room for altered mental status. Differential includes intracranial hemorrhage, meningitis, encephalitis, intracranial mass, intoxication, infection, ACS. Will evaluate with CT head. Patient was taken stat to the CT scanner after my initial evaluation. I accompanied the patient to the CT scanner. At this point we diagnosed a large intracranial hemorrhage most likely hypertensive given the elevated blood pressure the patient. She was given 20 mg of labetalol at CT scanner. Stroke alert was then paged and neurosurgery was notified.   Patient was brought back to the emergency department where she was intubated for airway protection. I spoke with neuro interventional and they recommended Keppra given that she had already had seizures. This was ordered. I also spoke with neurosurgery and they were agreeable for hypertonic saline because there was midline shifting noted on CT scan. Patient to be admitted to the ICU. Family was updated at this time.  /       The patient was evaluated during the [facility] COVID-19 pandemic, and that diagnosis was considered upon their initial presentation. Their evaluation, treatment and testing was consistent with current guidelines for patients who present with complaints or symptoms that may be related to COVID-19.     Strict returnprecautions and follow up instructions were discussed with the patient with which the patient agrees        ED Medications administered this visit:    Medications   0.9 % sodium chloride bolus ( IntraVENous Canceled Entry 6/30/22 1200)   levETIRAcetam (KEPPRA) 1,000 mg in sodium chloride 0.9 % 100 mL IVPB (has no administration in time range)   niCARdipine (CARDENE) 25 mg in dextrose 5 % 250 mL infusion (0 mg/hr IntraVENous Stopped 6/30/22 1614)   propofol injection (25 mcg/kg/min × 81.6 kg IntraVENous Rate/Dose Verify 6/30/22 1930)   tranexamic acid (CYKLOKAPRON) 1,000 mg in sodium chloride 0.9 % 100 mL IVPB (0 mg IntraVENous Stopped 6/30/22 1529)     Followed by   tranexamic acid (CYKLOKAPRON) 1,000 mg in sodium chloride 0.9 % 100 mL IVPB (1,000 mg IntraVENous New Bag 6/30/22 1527)   sodium chloride 3 % solution (25 mL/hr IntraVENous Rate/Dose Verify 6/30/22 1930)   0.9 % sodium chloride infusion ( IntraVENous Rate/Dose Verify 6/30/22 1930)   sodium chloride flush 0.9 % injection 5-40 mL (10 mLs IntraVENous Given 6/30/22 2031)   sodium chloride flush 0.9 % injection 5-40 mL (has no administration in time range)   0.9 % sodium chloride infusion (has no administration in time range)   morphine (PF) injection 2 mg (has no administration in time range)     Or morphine injection 4 mg (has no administration in time range)   LORazepam (ATIVAN) injection 2 mg (has no administration in time range)   labetalol (NORMODYNE;TRANDATE) injection syringe 20 mg (20 mg IntraVENous Given 6/30/22 1143)   iopamidol (ISOVUE-370) 76 % injection 80 mL (80 mLs IntraVENous Given 6/30/22 1145)   rocuronium (ZEMURON) injection (100 mg IntraVENous Given 6/30/22 1155)   etomidate (AMIDATE) injection (20 mg IntraVENous Given 6/30/22 1152)   levETIRAcetam (KEPPRA) 4,500 mg in sodium chloride 0.9 % 250 mL IVPB (0 mg IntraVENous Stopped 6/30/22 1324)   labetalol (NORMODYNE;TRANDATE) injection syringe 20 mg (20 mg IntraVENous Given 6/30/22 1215)   sodium chloride 3 % solution 100 mL (0 mLs IntraVENous Stopped 6/30/22 1324)         Critical Care  Performed by: Lina Mondragon MD  Authorized by: Selam Ferro MD     Critical care provider statement:     Critical care time (minutes):  90    Critical care time was exclusive of:  Separately billable procedures and treating other patients and teaching time    Critical care was necessary to treat or prevent imminent or life-threatening deterioration of the following conditions:  CNS failure or compromise    Critical care was time spent personally by me on the following activities:  Blood draw for specimens, development of treatment plan with patient or surrogate, discussions with consultants, evaluation of patient's response to treatment, examination of patient, obtaining history from patient or surrogate, ordering and performing treatments and interventions, ordering and review of laboratory studies, ordering and review of radiographic studies, pulse oximetry, re-evaluation of patient's condition, vascular access procedures and ventilator management  Intubation    Date/Time: 6/30/2022 1:25 PM  Performed by: Lina Mondragon MD  Authorized by:  Selam Ferro MD     Consent:     Consent obtained:  Emergent situation  Pre-procedure details:     Patient status:  Unresponsive    Mallampati score:  II    Pretreatment medications:  None    Paralytics:  Rocuronium  Procedure details:     Preoxygenation:  Bag valve mask    CPR in progress: no      Intubation method:  Oral    Oral intubation technique:  Video-assisted    Laryngoscope blade: Mac 4    Tube size (mm):  7.5    Tube type:  Cuffed    Number of attempts:  1    Ventilation between attempts: no      Cricoid pressure: no      Tube visualized through cords: yes    Placement assessment:     ETT to lip:  24    Tube secured with:  ETT vazquez    Breath sounds:  Equal    Placement verification: chest rise, condensation, CXR verification, direct visualization, equal breath sounds and ETCO2 detector      CXR findings:  ETT in proper place  Post-procedure details:     Patient tolerance of procedure: Tolerated well, no immediate complications  Comments:      Initially ET tube was in the right mainstem. This was visualized immediately on chest x-ray and the ET tube was readjusted. Central Line    Date/Time: 6/30/2022 1:27 PM  Performed by: Sherryle Martini, MD  Authorized by: Maria Eugenia Vallejo MD     Consent:     Consent obtained:  Emergent situation  Pre-procedure details:     Hand hygiene: Hand hygiene performed prior to insertion      Sterile barrier technique: All elements of maximal sterile technique followed      Skin preparation:  2% chlorhexidine    Skin preparation agent: Skin preparation agent completely dried prior to procedure    Sedation:     Sedation type:  Deep  Anesthesia (see MAR for exact dosages):      Anesthesia method:  None  Procedure details:     Location:  R internal jugular    Patient position:  Flat    Procedural supplies:  Triple lumen    Catheter size:  7.5 Fr    Landmarks identified: yes      Ultrasound guidance: yes      Sterile ultrasound techniques: Sterile gel and sterile probe covers were used      Number of attempts:  1    Successful placement: yes    Post-procedure details: Post-procedure:  Dressing applied and line sutured    Assessment:  Blood return through all ports, free fluid flow, no pneumothorax on x-ray and placement verified by x-ray    Patient tolerance of procedure: Tolerated well, no immediate complications    : (None if blank)       CLINICAL       1. Intracranial hemorrhage ([facility] [address]          DISPOSITION/PLAN   DISPOSITION Admitted 06/30/2022 01:04:21 PM      PATIENT REFERRED TO:  No follow-up provider specified. DISCHARGE MEDICATIONS:  There are no discharge medications for this patient.              (Please note that portions of this note were completed with a voice recognition program.  Efforts were made to edit the dictations but occasionallywords are mis-transcribed.)      Electronically signed by Maritza Pichardo MD on 6/30/22 at 1:22 PM EDT    Attending Physician, Emergency Department       Raza Whitaker MD  06/30/22 2050

## 2022-06-30 NOTE — H&P
CRITICAL CARE PROGRESS NOTE      Patient:  Yakelin Kearney    Unit/Bed:4D-12/012-A  YOB: 1965  MRN: [id number]   PCP: No primary care provider on file. Date of Admission: 6/30/2022  Chief Complaint:-Altered mental status    Assessment and Plan:      1. Acute hypoxic respiratory failure: Requiring emergent intubation in the emergency department secondary to inability to maintain airway. Maintain peak pressures less than 35. Mental status precludes extubation. 2. Intraparenchymal hemorrhage: Extending into the ventricular system. Neurosurgery was consulted. TXA was given. Patient was started on 3% saline. Patient will go emergently to the OR for EVD placement. Possible clot debulking to follow tomorrow. TEG is pending. INR is normal.  3. Hypertensive emergency: Patient's blood pressure noted to be systolic greater than 497 on arrival to the emergency department. Patient was placed on Cardene drip. Maintain systolic blood pressure less than 160.  4. Seizures: Patient was initially called as code stroke. Neurology saw in the emergency department where she was noted to have focal seizure. Patient was loaded with 4.5 g of Keppra. 1000 mg Keppra twice daily continues. No obvious signs of seizures at this time. EEG pending. 5. Lactic acidosis: Secondary to seizure. Repeat pending. 6. Leukocytosis: Mild, monitor no signs of infection at this time. 7. Elevated glucose: Trend. A1c. Likely secondary to stress. INITIAL H AND P AND ICU COURSE:    Katlin Webb is a 61-year-old -American female who presented to [facility] on 6/30/2022 after complains of unresponsive episode at home. She has no known past medical history per family. They state that she is a non-smoker and uses social alcohol. They also state that she has a \"holistic approach to medicine\".   Per report patient was last seen last evening around 5:30 PM when she was normal when she went out to eat with her family. When they returned she was noted to be vomiting and ended up falling on the floor last evening. The fall was unwitnessed. Her sister did check up on her at that evening and patient was laying on the floor. They check on her again this morning and she was still laying in the same spot. She was drooling and her body was shaking. At that time 911 was called. She was given Narcan in route to the emergency department with no response. On arrival code stroke was called. She was taken for stat CT head which did reveal a large intraparenchymal hemorrhage extending into the ventricular system. She went for stat CTA which was negative for aneurysm. She was intubated in the emergency department and transferred to the ICU. On interview with family they state that she has no medical history they are aware of. They deny any smoking or drug use. They states she is a social drinker of wine. She has no children or no . Brothers and sisters are at bedside making decisions. Mother is at bedside but does have dementia. Case was reviewed extensively with Dr. Reece Buckley and Dr. Chun Langford. Dr. Reece Buckley did speak with family and plan is for EVD placement this evening. Past Medical History:  Per HPI  Family History: No known family history  Social History: Family denies smoking, social alcohol use. Denies drug use. ROS   Able to review secondary to mental status    Scheduled Meds:   sodium chloride  1,000 mL IntraVENous Once    [START ON 7/1/2022] levETIRAcetam  1,000 mg IntraVENous Q12H    tranexamic acid (CYCLOKAPRON) IVPB  1,000 mg IntraVENous Once    Followed by    tranexamic acid (CYCLOKAPRON) IVPB  1,000 mg IntraVENous Once     Continuous Infusions:   niCARdipine 12.5 mg/hr (06/30/22 1408)    propofol 25 mcg/kg/min (06/30/22 1304)    sodium chloride         PHYSICAL EXAMINATION:  T:  98.3. P:  94. RR:  20. B/P:  176/98. FiO2:  100. O2 Sat:  100. I/O:  0  Body mass index is 28.47 kg/m². PC: 16/6: TV: 385: RRTotal: 20: Ti:1 sec  General:   Acute on chronically ill-appearing black female  HEENT:  normocephalic and atraumatic. No scleral icterus. Pinpoint and nonreactive  Neck: supple. No Thyromegaly. Lungs: clear to auscultation. No retractions  Cardiac: RRR. No JVD. Abdomen: soft. Nontender. Extremities:  No clubbing, cyanosis, or edema x 4. Vasculature: capillary refill < 3 seconds. Palpable dorsalis pedis pulses. Skin:  warm and dry. Psych: Withdraws from pain x3  Lymph:  No supraclavicular adenopathy. Neurologic: No movement of left upper extremity no seizures. Data: (All radiographs, tracings, PFTs, and imaging are personally viewed and interpreted unless otherwise noted).  Sodium 143, potassium 3.9, chloride 102, CO2 23, BUN 16, creatinine 1.2, anion gap 18.0, lactic 2.9, glucose 152   WBC 11.6, hemoglobin 18.4, hematocrit 56.8, platelet count 426   Telemetry shows NSR    CT head 6/30/2022 reports 4.8 x 3.4 cm intraparenchymal hemorrhage of the right basal ganglia 8 mm midline shift. Mild hydrocephalus. Intraventricular blood is noted.  CTA head and neck 6/30/2022 reports negative CTA of head and neck, extensive hemorrhage within the right basal ganglia lateral third and fourth ventricles.  Chest x-ray 6/30/2022 reports L atelectasis. Small bilateral pleural effusions. Meets Continued ICU Level Care Criteria:    [x] Yes   [] No - Transfer Planned to listed location:  [] HOSPITALIST CONTACTED-      Case and plan discussed with Dr. Ara Coffey, neurology service and Dr. Jose Guadalupe Flores. Electronically signed by Anibal Ludwig. CHELLY Posey - CNP  CRITICAL CARE SPECIALIST  Patient seen by me including key components of medical care. Case discussed with NP. Case discussed with Dr. Jose Guadalupe Flores. Awaiting EVD. Planned clot extraction tomorrow to utilize neuro-navigation. Continue with Cardene drip. Maintain CPP > 65.   Italicized font, if present,  represents changes to the note made by me. CC time 35 minutes. Time was discontiguous. Time does not include procedure. Time does include my direct assessment of the patient and coordination of care. Time represents more than 50% of the time involved with patient care by the [address] [place] [address] team.  Electronically signed by Judith Nobles.  Suzi Ohara MD.

## 2022-06-30 NOTE — ANESTHESIA PRE PROCEDURE
Department of Anesthesiology  Preprocedure Note       Name:  Dsei Rabago   Age:  60 y.o.  :  1965                                          MRN:  [id number]         Date:  2022      Surgeon: Мария Skaggs):  Viji Ugarte MD    Procedure: Procedure(s):  EXTERNAL VENTRICULAR DRAIN PLACEMENT    Medications prior to admission:   Prior to Admission medications    Not on File       Current medications:    Current Facility-Administered Medications   Medication Dose Route Frequency Provider Last Rate Last Admin    0.9 % sodium chloride bolus  1,000 mL IntraVENous Once Bernis Hong, MD [place]reston Sicard [START ON 2022] levETIRAcetam (KEPPRA) 1,000 mg in sodium chloride 0.9 % 100 mL IVPB  1,000 mg IntraVENous Q12H Jimi Baumann PA-C        niCARdipine (CARDENE) 25 mg in dextrose 5 % 250 mL infusion  2.5-15 mg/hr IntraVENous Continuous Rui Pimentel  mL/hr at 22 1535 12.5 mg/hr at 22 1535    propofol injection  5-50 mcg/kg/min IntraVENous Continuous Rui Pimentel MD 12.2 mL/hr at 22 1304 25 mcg/kg/min at 22 1304    tranexamic acid (CYKLOKAPRON) 1,000 mg in sodium chloride 0.9 % 100 mL IVPB  1,000 mg IntraVENous Once CHELLY Blevins CNP 12.5 mL/hr at 22 1527 1,000 mg at 22 1527    sodium chloride 3 % solution  25 mL/hr IntraVENous Continuous CHELLY Blevins CNP 25 mL/hr at 22 1542 25 mL/hr at 22 1542       Allergies:  No Known Allergies    Problem List:    Patient Active Problem List   Diagnosis Code    [facility] D18.02       Past Medical History:  No past medical history on file. Past Surgical History:  No past surgical history on file.     Social History:    Social History     Tobacco Use    Smoking status: Not on file    Smokeless tobacco: Not on file   Substance Use Topics    Alcohol use: Not on file                                Counseling given: Not Answered      Vital Signs (Current):   Vitals: 06/30/22 1322 06/30/22 1326 06/30/22 1337 06/30/22 1400   BP: (!) 151/95 (!) 170/102 (!) 176/98    Pulse: 84 82 94    Resp: 17 20 20    Temp:       SpO2: 100% 100% 100%    Weight:    171 lb 1.2 oz (77.6 kg)   Height:                                                  BP Readings from Last 3 Encounters:   06/30/22 (!) 176/98       NPO Status:                                                                                 BMI:   Wt Readings from Last 3 Encounters:   06/30/22 171 lb 1.2 oz (77.6 kg)     Body mass index is 28.47 kg/m². CBC:   Lab Results   Component Value Date/Time    WBC 11.6 06/30/2022 11:45 AM    RBC 5.85 06/30/2022 11:45 AM    HGB 18.4 06/30/2022 11:45 AM    HCT 56.8 06/30/2022 11:45 AM    MCV 97.1 06/30/2022 11:45 AM     06/30/2022 11:45 AM       CMP:   Lab Results   Component Value Date/Time     06/30/2022 03:10 PM    K 3.9 06/30/2022 11:45 AM     06/30/2022 11:45 AM    CO2 23 06/30/2022 11:45 AM    BUN 16 06/30/2022 11:45 AM    CREATININE 1.2 06/30/2022 11:45 AM    LABGLOM 56 06/30/2022 11:45 AM    GLUCOSE 152 06/30/2022 11:45 AM    PROT 9.5 06/30/2022 11:45 AM    CALCIUM 10.1 06/30/2022 11:45 AM    BILITOT 0.5 06/30/2022 11:45 AM    ALKPHOS 95 06/30/2022 11:45 AM    AST 33 06/30/2022 11:45 AM    ALT 19 06/30/2022 11:45 AM       POC Tests: No results for input(s): POCGLU, POCNA, POCK, POCCL, POCBUN, POCHEMO, POCHCT in the last 72 hours.     Coags:   Lab Results   Component Value Date/Time    INR 1.08 06/30/2022 11:45 AM       HCG (If Applicable): No results found for: PREGTESTUR, PREGSERUM, HCG, HCGQUANT     ABGs: No results found for: PHART, PO2ART, BCA1MGY, VCW0HCW, BEART, O4KHFVKL     Type & Screen (If Applicable):  No results found for: LABABO, LABRH    Drug/Infectious Status (If Applicable):  No results found for: HIV, HEPCAB    COVID-19 Screening (If Applicable): No results found for: COVID19        Anesthesia Evaluation  Patient summary reviewed  Airway: Mallampati: Unable to assess / NA         Intubated Dental:          Pulmonary:normal exam                               Cardiovascular:                      Neuro/Psych:   (+) seizures:, CVA (Acute):,             GI/Hepatic/Renal:             Endo/Other:                     Abdominal:             Vascular: Other Findings:           Anesthesia Plan      general     ASA 3       Induction: intravenous and inhalational.    MIPS: Postoperative ventilation and prophylactic pharmacologic antiemetic agents not administered perioperatively for documented reasons. Anesthetic plan and risks discussed with healthcare power of  and sibling. Use of blood products discussed with sibling whom consented to blood products.    Plan discussed with CRNA and surgical team.                    Barbara Tsai MD   6/30/2022

## 2022-06-30 NOTE — SEDATION DOCUMENTATION
Pt presents to the ED via EMS. EMS reports she was found unresponsive. EMS reports giving 2 of narcan. EMS reports family last saw her at 7:30 when she was fine. EMS reports in the EMS pt had seizure like activity. Upon arrival pt not responding to questions. EKG completed at this time.

## 2022-07-01 NOTE — PROGRESS NOTES
Comprehensive Nutrition Assessment    Type and Reason for Visit:  Initial (ventilator patient)    Nutrition Recommendations/Plan:   1. If able to use gut, recommend Pivot tube feed at 10ml/ hour, increase by 10ml every 8 hours as tolerated to goal 40ml/ hour (to provide 1440 kcals, 1810 kcals including diprivan, 90 grams protein, 166 grams CHO, 7 grams fiber, 720ml free water in 960ml/ 24 hours); additional free water flush as per Doctor     Malnutrition Assessment:  Malnutrition Status:  Insufficient data (07/01/22 1521)    Context:  Acute Illness     Findings of the 6 clinical characteristics of malnutrition:  Energy Intake:  Unable to assess  Weight Loss:  Unable to assess (no weight records per EMR)     Body Fat Loss:  Unable to assess     Muscle Mass Loss:  Unable to assess    Fluid Accumulation:  Unable to assess     Strength:  Not Performed    Nutrition Assessment:      Pt. nutritionally compromised AEB NPO status due to intubation. At risk for further nutrition compromise r/t admit with intracranial hemorrhage, 6/30 right frontal bur hole and EVD placed, today off unit for surgery, respiratory failure, hypertensive emergency, seizure and underlying medical condition (no known past medical history).       Nutrition Related Findings:    Pt. Report/Treatments/Miscellaneous: intubated 6/30, OG tube, spoke with RN earlier today, patient off unit for surgery   GI Status: RN report abdomen soft prior to leaving for surgery this morning   Pertinent Labs: Sodium 151, Potassium 3.4, BUN 17, Creatinine 1.4, Glucose 130, A1C 6.1%  Pertinent Meds: ancef, keppra, cardene    Wound Type: Surgical Incision (head incision 6/30: frontal matt hole, EVD placed; 7/1: plan for craniotomy with hematoma evacuation)       Current Nutrition Intake & Therapies:          Diet NPO  Diprivan at 14ml/ hour provides 370 lipid kcals/ 24 hours     Anthropometric Measures:  Height: 5' 5\" (165.1 cm)  Ideal Body Weight (IBW): 125 lbs (57 kg)

## 2022-07-01 NOTE — ANESTHESIA POSTPROCEDURE EVALUATION
Department of Anesthesiology  Postprocedure Note    Patient: Ludwin Salter  MRN: [id number]  YOB: 1965  Date of evaluation: 7/1/2022      Procedure Summary     Date: 07/01/22 Room / Location: [address] SAMANTHA Morrison    Anesthesia Start: Suzann Iba Anesthesia Stop: 2165    Procedure: [address] (N/A Head) Diagnosis:       Tumor      (Tumor [D49.9])    Surgeons: Demetrio Hernandez MD Responsible Provider: Miri Johnson DO    Anesthesia Type: general ASA Status: 4          Anesthesia Type: No value filed.     Dottie Phase I:      Dottie Phase II:        Anesthesia Post Evaluation    Patient location during evaluation: ICU  Patient participation: complete - patient cannot participate  Level of consciousness: sedated and ventilated  Airway patency: patent  Complications: no  Cardiovascular status: hemodynamically stable  Respiratory status: ventilator  Hydration status: euvolemic

## 2022-07-01 NOTE — PROGRESS NOTES
Patient was seen and examine in the ICU. This is a 64-year-old female who was admitted on 6/30/2022 after she was found unresponsive per her family. Patient was intubated upon his arrival to the ER. The brain CT showed: Acute intra cerebral (right basal ganglion )and intraventricular hemorrhage. Patient underwent EVD placement yesterday. In today visit:     Patient is still intubated and sedated. GCS: 6T.  Pupils equal and reactive. Has cough reflex. Has corneal reflex. Today brain CT showed:         Right anterior basal ganglia acute parenchymal hemorrhage 4.0 x 3.1 x 2.4    cm. Previously 4.8 x 3.1 x 2.1 cm. Acute blood throughout the ventricles, and a right frontal ventriculostomy    are again noted. Leftward midline shift of septum pellucidum 7 mm, previously 6 mm. Small amount of pneumocephalus within the ventricular system again noted. Today labs has been reviewed. Patient recommendation treatment plan from neurosurgical perspective:     · Medical management per ICU team.  · Seizure precaution. · Follow-up the recommendation of stroke/neurology team.  · Keep systolic blood pressure less than 160 and above 100. · New brain CT tomorrow morning.     - EVD management:     ·  Keep the ICP less than  20 mm HG  · Keep systolic blood pressure more than 100  mm HG  · Keep Cerebral perfusion pressure (CPP)  more than 60 mm HG  · Keep blood glucose from  mg  · Open the EVD at the level of the 10 CM. Decision making:    · I discussed the case today again thoroughly with the ICU team ( [facility] We reviewed today brain CT. We both in agreement that the condition has a poor prognosis. However, one treatment option from neurosurgical perspective is to perform an emergency right sided craniotomy for evacuation of patient intra cerebral hemorrhage as a potential life saving porcedure.   This possible surgical intervention was discussed in detail with patient's family including

## 2022-07-01 NOTE — PLAN OF CARE
Problem: Discharge Planning  Goal: Discharge to home or other facility with appropriate resources  Outcome: Progressing  Flowsheets (Taken 7/1/2022 0108)  Discharge to home or other facility with appropriate resources:   Arrange for needed discharge resources and transportation as appropriate   Identify barriers to discharge with patient and caregiver   Identify discharge learning needs (meds, wound care, etc)  Note: Discharge planning ongoing. OR 7/1 for clot debulking. Problem: Respiratory - Adult  Goal: Achieves optimal ventilation and oxygenation  7/1/2022 0108 by Nba Leblanc RN  Outcome: Progressing  Flowsheets (Taken 7/1/2022 0108)  Achieves optimal ventilation and oxygenation:   Position to facilitate oxygenation and minimize respiratory effort   Assess for changes in mentation and behavior  Note: Remains on mechanical ventilation. Problem: Pain  Goal: Verbalizes/displays adequate comfort level or baseline comfort level  Outcome: Progressing  Flowsheets (Taken 7/1/2022 0108)  Verbalizes/displays adequate comfort level or baseline comfort level:   Implement non-pharmacological measures as appropriate and evaluate response   Assess pain using appropriate pain scale  Note: No physiological s/s of pain. PRN medications available if needed     Problem: Safety - Adult  Goal: Free from fall injury  Outcome: Progressing  Flowsheets (Taken 7/1/2022 0108)  Free From Fall Injury: Instruct family/caregiver on patient safety  Note: No falls this shift. Precuations remains in place. Problem: ABCDS Injury Assessment  Goal: Absence of physical injury  Outcome: Progressing  Flowsheets (Taken 7/1/2022 0108)  Absence of Physical Injury: Implement safety measures based on patient assessment  Note: No injuries noted. Problem: Skin/Tissue Integrity  Goal: Absence of new skin breakdown  Description: 1. Monitor for areas of redness and/or skin breakdown  2. Assess vascular access sites hourly  3. Every 4-6 hours minimum:  Change oxygen saturation probe site  4. Every 4-6 hours:  If on nasal continuous positive airway pressure, respiratory therapy assess nares and determine need for appliance change or resting period. Outcome: Progressing  Note: Turn pt q2h and PRN. Pressure ulcer prevention in place. Care plan reviewed with patient's family. Patient's family verbalize understanding of the plan of care and contribute to goal setting.

## 2022-07-01 NOTE — PLAN OF CARE
Problem: Respiratory - Adult  Goal: Achieves optimal ventilation and oxygenation  7/1/2022 0024 by Buck Smith RCP  Outcome: Progressing                                                  Patient Weaning Progress    The patient's vent settings was able to be weaned this shift. Ventilator settings that were weaned              [] Mode   [] Pressure support weaned   [x] Fio2 weaned   [] Peep weaned             Spontaneous weaning trial  was not attempted. Patient scheduled for surgery at 0730 this morning. Evac tube was  hooked up with continuous low suction(20-30mmHg)      Cuff was  deflated to determine cuff leak. A leak  was detected.  Cuff leak was 17%       *Specific details of weaning located in Ventilator documentation flowsheets*

## 2022-07-01 NOTE — PROGRESS NOTES
Patient:  Emmette Nose    Unit/Bed:4D-12/012-A  MRN: [id number]   PCP: No primary care provider on file. Date of Admission: 6/30/2022    Assessment and Plan(All pulmonary edema, renal failure, PE, and respiratory failure diagnoses are acute in nature unless otherwise specified):        1. Acute hypoxic respiratory failure: Patient intubated in emergency department 6/30 secondary to inability to maintain airway. Maintain peak pressure of 35 or less and plateau pressure of 30 or less. Mental status precludes extubation. 2. Intraparenchymal hemorrhage: With extension into the ventricular system. TXA was given. Neurosurgery following. Patient taken to the OR on 6/30 for EVD placement. Patient currently on 3% hypertonic saline. ICP goal <20; SBP Goal <160>100; CPP goal >60. CT [place] protocol for debulking 7/1/22 with NS.   3. Hypertensive Emergency: Systolic greater than 755 on arrival to the ED. Nicardipine drip. Systolic blood pressure goal less than 160 but greater than 100.  4. Seizures: Patient presented as a code stroke. Neurology saw patient in the emergency department where he was noted she had a focal seizure. She was loaded with 4.5 g of Keppra. 1000 mg Keppra twice daily per neurology. EEG pending. Neurology following. 5. TOSIN: Creatinine 1.4 up from 1.2; will monitor and trend. 6. Hypokalemia: PRN potassium replacement. 7. Leukocytosis: WBC 16.6 up from 11.6. Remains afebrile; will trend and monitor for signs of infection. CC:  AMS  Chief Complaint   Patient presents with    Altered Mental Status       HPI:   Per Chart Review: Leonard Live is a 49-year-old -American female who presented to [facility] on 6/30/2022 after complains of unresponsive episode at home. She has no known past medical history per family. They state that she is a non-smoker and uses social alcohol. They also state that she has a \"holistic approach to medicine\".   Per report patient was last seen last evening around 5:30 PM when she was normal when she went out to eat with her family. When they returned she was noted to be vomiting and ended up falling on the floor last evening. The fall was unwitnessed. Her sister did check up on her at that evening and patient was laying on the floor. They check on her again this morning and she was still laying in the same spot. She was drooling and her body was shaking. At that time 911 was called. She was given Narcan in route to the emergency department with no response. On arrival code stroke was called. She was taken for stat CT head which did reveal a large intraparenchymal hemorrhage extending into the ventricular system. She went for stat CTA which was negative for aneurysm. She was intubated in the emergency department and transferred to the ICU. On interview with family they state that she has no medical history they are aware of. They deny any smoking or drug use. They states she is a social drinker of wine. She has no children or no . Brothers and sisters are at bedside making decisions. Mother is at bedside but does have dementia. Case was reviewed extensively with Dr. Rishi Arrieta and Dr. Bala Lomas. Dr. Rishi Arrieta did speak with family and plan is for EVD placement this evening. \"    6/30: EVD placed    ROS:     RoS limited due to intubation and mental status. PMH:  Per HPI  SHX:    Per Chart Review:  Social History    None         FHX:   Per Chart Review:  History reviewed. No pertinent family history.     Allergies:   Per Chart Review:  No Known Allergies    Medications:     niCARdipine 2 mg/hr (07/01/22 0605)    propofol 30 mcg/kg/min (07/01/22 0516)    sodium chloride Stopped (07/01/22 0157)    sodium chloride 100 mL/hr at 07/01/22 0516    sodium chloride        sodium chloride  1,000 mL IntraVENous Once    levETIRAcetam  1,000 mg IntraVENous Q12H    sodium chloride flush  5-40 mL IntraVENous 2 times per day       Vital Signs:   Vitals:    07/01/22 0400 07/01/22 0500 07/01/22 0600 07/01/22 0644   BP: 132/69 127/68 126/69    Pulse: 84 81 77    Resp: 12 21 26    Temp:       TempSrc:       SpO2: 98% 98% 98%    Weight:    171 lb 1.2 oz (77.6 kg)   Height:           T: 98.6   P: 80   RR: 33   B/P: 128/76  FiO2: 25  O2 Sat:97    I/O: CCXROJ:1775 / Output:1843 =  Total +1024   I/O last 3 completed shifts: In: 2867.4 [I.V.:2076.8; IV Piggyback:790.7]  Out: 1843 [Urine:940; Emesis/NG output:900; Blood:3]  No intake/output data recorded. GCS: 3T    Body mass index is 28.47 kg/m². PCV  Set 12  Right measured 14  PIP 23  PEEP 6  FiO2 25%    Physical Exam:     General:   -American female  HEENT:  normocephalic and atraumatic. No scleral icterus. PERR  Neck: supple. No Thyromegaly. Lungs: clear to auscultation. No retractions  Cardiac: RRR. No JVD. Abdomen: soft. Nontender. Extremities:  No clubbing, cyanosis, or edema x 4. Vasculature: capillary refill < 3 seconds. Palpable dorsalis pedis pulses. Skin:  warm and dry. Psych: Intubated and sedated on mechanical ventilator, withdraws from pain  Lymph:  No supraclavicular adenopathy. Neurologic:  No focal deficit. No seizures. Debated and sedated on mechanical ventilator. Withdraws from pain    Data: (All radiographs, tracings, PFTs, and imaging are personally viewed and interpreted unless otherwise noted).  Patient appears to be in sinus rhythm on telemetry.  Sodium 151 potassium 3.4 chloride 113 CO2 27   Creatinine 1.4 BUN 17 anion gap 10   Glucose 130   CBC 16.6 platelets 886 hemoglobin 15.8   CTH 7/1: Slightly decreased hemorrhage within the right basal ganglia. Leftward midline shift of septum pellucidum 7 mm, previously 6 mm.  Case discussed with NS Dr Leandra Sabillon.     Electronically signed by Vidhya Vasquez MD, MPH              Electronically signed by Claudine Hooker MD on 7/1/2022 at 8:43 AM                    Patient seen by me including key components of medical care. Case discussed with resident physician. Case cussed with Dr. Jose Guadalupe Flores. Patient with large intraparenchymal clot. Patient undergoing a craniotomy for maximally safe clot extraction. Patient with EVD. Continue to keep CCP 65 or greater. Hypertonic saline for surrounding cerebral edema. .  Italicized font, if present,  represents changes to the note made by me. CC time 35 minutes. Time was discontiguous. Time does not include procedure. Time does include my direct assessment of the patient and coordination of care. Time represents more than 50% of the time involved with patient care by the [address] [place] [address] team.  Electronically signed by Tae Greer.  [place] MD.

## 2022-07-01 NOTE — OP NOTE
[facility]  RECORD OF OPERATION    Patient name: Safia Keys  Medical Record Number: [id number]  Account Number: [de-identified]      Date of Procedure: 6/30/2022    Pre-operative Diagnosis:      · Large acute intracerebral bleed with extension to the intraventricular system. · Acute hydrocephalus. · Increased intracranial pressure. Post-operative Diagnosis: The same     PROCEDURE:   · Right-sided frontal matt hole. · Right-sided ventriculostomy. · Right-sided EVD placement. Anesthesia: General endotracheal     Surgeons/Assistants: Rukhsana Lopez MD      Estimated Blood Loss: Minimal      Drains: Right sided EVD drain      Blood Transfusions: None      Complications: none immediately appreciated     Specimens:  None      Indications for Procedure:     (Please see my today consultation note for this patient for more information). This is a 42-year-old female who was found un response per her family, Patient underwent underwent brain CT that showed large right intra parenchymal hemorrhage with extension to ventricular system and associated hydrocephalus. Based on patient current clinical status the findings of her brain CTs, surgical intervention left in the form of right-sided bur hole versus EVD placement was recommended for the patient. This recommended surgical intervention was discussed with patient's family in detail as well as the associated risks and benefits. All questions and concerns were addressed and answered. Patient's family elected to proceed with the recommended surgical intervention. PROCEDURE:         Patient was brought  to the OR. Patient was already intubated. Patient was placed in her bed in supine position. Her head was placed in 40 degree. The area over right frontal area was shaved and prepped. A small skin incision was made about cm lateral to midline and 1 cm anterior to coronal suture. Then a matt hole was made by utilizing a high speed drill.  Next , the dura was incised and coagulated. An external ventricular catheter was inserted. A CSF flow was observed from the first pass. Then the catheter was advanced till 7 cm. Following that, the catheter was  tunneled and  secured by silk suture. The catheter was connected to EVD collected system and the wound was closed in standard  Fashion. Sterilized dressing was applied. At the end of the surgery patient was kept intubated and she was transferred to the ICU for further observation treatment. Patient tolerated the procedure very well without intraoperative complication.     Ramos Jackson MD, MD  Electronically signed by me on 6/30/2022 at 9:25 PM

## 2022-07-01 NOTE — ANESTHESIA PRE PROCEDURE
Department of Anesthesiology  Preprocedure Note       Name:  Karen Salas   Age:  60 y.o.  :  1965                                          MRN:  [id number]         Date:  2022      Surgeon: Missy Lewis):  Glynn Murry MD    Procedure: Procedure(s):  CRANIOTOMY HEMATOMA EVACUATION    Medications prior to admission:   Prior to Admission medications    Not on File       Current medications:    Current Facility-Administered Medications   Medication Dose Route Frequency Provider Last Rate Last Admin    0.9 % sodium chloride bolus  1,000 mL IntraVENous Once [place] Jeb PA-C        levETIRAcetam (KEPPRA) 1,000 mg in sodium chloride 0.9 % 100 mL IVPB  1,000 mg IntraVENous Q12H [place] Russ, PA-C   Stopped at 22 0044    niCARdipine (CARDENE) 25 mg in dextrose 5 % 250 mL infusion  2.5-15 mg/hr IntraVENous Continuous [place], PA-C 20 mL/hr at 22 0605 2 mg/hr at 22 7574    propofol injection  5-50 mcg/kg/min IntraVENous Continuous [place], PA-C 14.7 mL/hr at 22 0516 30 mcg/kg/min at 22 0516    sodium chloride 3 % solution  25 mL/hr IntraVENous Continuous [place], PA-C   Stopped at 22 0157    0.9 % sodium chloride infusion   IntraVENous Continuous [place], PA-C 100 mL/hr at 22 0516 New Bag at 22 0516    sodium chloride flush 0.9 % injection 5-40 mL  5-40 mL IntraVENous 2 times per day [place] Ol[place], PA-C   10 mL at 22 203    sodium chloride flush 0.9 % injection 5-40 mL  5-40 mL IntraVENous PRN [place], PA-C        0.9 % sodium chloride infusion   IntraVENous PRN [place], PA-C        morphine (PF) injection 2 mg  2 mg IntraVENous Q2H PRN [place] Ol[place], PA-C        Or    morphine injection 4 mg  4 mg IntraVENous Q2H PRN [place], PA-C        LORazepam (ATIVAN) injection 2 mg  2 mg IntraVENous Q30 Min PRN Jimi Baumann PA-C           Allergies:  No POCGLU, POCNA, POCK, POCCL, POCBUN, POCHEMO, POCHCT in the last 72 hours. Coags:   Lab Results   Component Value Date/Time    INR 1.08 06/30/2022 11:45 AM       HCG (If Applicable): No results found for: PREGTESTUR, PREGSERUM, HCG, HCGQUANT     ABGs: No results found for: PHART, PO2ART, DPZ0GEJ, NNU9DTT, BEART, T0QVCKMK     Type & Screen (If Applicable):  Lab Results   Component Value Date    LABRH POS 06/30/2022       Drug/Infectious Status (If Applicable):  No results found for: HIV, HEPCAB    COVID-19 Screening (If Applicable): No results found for: COVID19        Anesthesia Evaluation  Patient summary reviewed and Nursing notes reviewed no history of anesthetic complications:   Airway: Mallampati: Unable to assess / NA         Intubated Dental:          Pulmonary:   (+) decreased breath sounds                           ROS comment: Pt is intubated    Cardiovascular:Negative CV ROS  Exercise tolerance: good (>4 METS),                     Neuro/Psych:                ROS comment: Acute intracranial hemorrhage GI/Hepatic/Renal: Neg GI/Hepatic/Renal ROS            Endo/Other: Negative Endo/Other ROS             Pt had no PAT visit       Abdominal:             Vascular:   + PVD, aortic or cerebral, . Other Findings:           Anesthesia Plan      general     ASA 4     (Pt sister is the POA. All history taken from chart and discussion with patient's sister. All questions answered and POA consents to GETA. )  Induction: inhalational.    MIPS: Postoperative ventilation. Anesthetic plan and risks discussed with healthcare power of .                         [address], DO   7/1/2022

## 2022-07-01 NOTE — CARE COORDINATION
7/1/22, 9:47 AM EDT  DISCHARGE PLANNING EVALUATION:    Elin Valencia       Admitted: 6/30/2022/ Holly Robert6 day: 1   Location: [facility] OR (General) POOL R* Reason for admit: Intracranial hemorrhage ([address] [I62.9]  Intracranial hemangioma ([address] [D18.02]   PMH:  has no past medical history on file. Procedure:   6/30 CT Head: A 4.8 x 3.4 cm intraparenchymal hemorrhage in the right basal ganglion is noted. 8 mm midline shift to the left at the level of the septum pellucidum. Mild hydrocephalus is seen. Intraventricular blood is also noted  6/30 CTA Head/Neck: Essentially negative CTA of the head and neck; Extensive hemorrhage within the right basal ganglia, lateral, third and fourth ventricles seen best on the noncontrast CT scan of the brain; Areas of abnormal density in the right and left lung fields; Slight heterogeneity involving the right and left lobes of the thyroid gland  6/30 Right frontal matt hole; EVD placed  6/30 Repeat CT Head: Large right-sided intraparenchymal hematoma and large hematoma throughout the ventricular system with mild leftward midline shift and mild ventriculomegaly, similar to the prior study; There is a new ventriculostomy tube overlying the foramina of [place]. There is a small amount of gas within the right lateral ventricle; There is severe cerebral edema, similar to the prior study  6/30 Intubated  6/30 CVC RIJ  6/30 CXR: Bilateral atelectasis/infiltrate; small bilateral pleural effusions  7/1 CT Head: Slightly decreased hemorrhage within the right basal ganglia    Barriers to Discharge: Presented to ED after unresponsive episode at home. Was last seen prior evening when went out to eat with family. That evening she was vomiting. Sister had checked up on her that evening and found her lying on the floor. They checked on her again the next morning (6/30) and she was still lying in the same spot, drooling, and her body was shaking. EMS called, given Narcan enroute with no response.  Code stroke in ED. Neurology consulted. Focal seizures. Loading dose of Keppra given. CT revealed large intraparenchymal hemorrhage extending into ventricular system. CTA negative for aneurysm. Neurosurgery consulted. TXA given. Intubated in ED for airway protection. Admitted to ICU. Taken to OR; EVD placed. Started on 3% saline drip. EEG ordered. Remains on vent. CVC and saw placed. Taken to OR this morning; plan for craniotomy with hematoma evacuation. PCP: No primary care provider on file. Readmission Risk Score: 10.5 ( )%  Patient's Healthcare Decision Maker: Legal Next of Kin    Patient Goals/Plan/Treatment Preferences: From home. Patient remains in surgery. Need meet & greet. Plan pending clinical course.

## 2022-07-01 NOTE — PROGRESS NOTES
[facility]  OCCUPATIONAL THERAPY MISSED TREATMENT NOTE  [facility] OR  [facility] OR (General) POOL R*      Date: 2022  Patient Name: Robyn Clark        CSN: [id number]   : 1965  (60 y.o.)  Gender: female                REASON FOR MISSED TREATMENT: Pt at sx. Will check back at a later date. CM recommends defer eval & await new orders when appropriate.

## 2022-07-02 NOTE — PLAN OF CARE
Problem: Respiratory - Adult  Goal: Achieves optimal ventilation and oxygenation  Outcome: Progressing   Patient remains on ventilator. Will wean as tolerated.  SBT when appropriate

## 2022-07-02 NOTE — PROGRESS NOTES
Pulmonary Critical Care Progress Note     Patient's name:  Angie Peres  Medical Record Number: [id number]  Patient's account/billing number: [de-identified]  Patient's YOB: 1965  Age: 60 y.o. Date of Admission: 6/30/2022 11:28 AM  Date of Consult: 7/2/2022      Primary Care Physician: No primary care provider on file. Code Status: Full Code    Chief complaint: Respiratory failure with hypoxia. Assessment and Plan      Respiratory failure hypoxia required intubation 6/30/2022 secondary to inability to maintain airways, continue ventilator support not ready for extubation due to mental status.  Intracranial hemorrhage with extension into the ventricular system status post EVD placement 6/30/2022 currently on 3% hypertonic saline goal of sodium 1 45-1 50 and Keppra. CT this morning showed stable intraparenchymal and intraventricular hemorrhage with EVD in place no new hemorrhage. Planning tPA through EVD drain today, discussed with Dr. Callie Suggs Seizure disorder secondary to intracranial hemorrhage on Keppra.  Acute kidney injury, monitor renal function, avoid nephrotoxic's.  Leukocytosis, likely reactive, cultures negative to date continue to monitor, afebrile. On cefazolin   Hypertensive emergency on nicardipine drip goal systolic blood pressure between 100-160  And CPP > 65.    GI/DVT prophylaxis   Started on Tube feeding      Overnight:  Remains on the vent on propofol. No seizure activity. Afebrile. No pressor requirement. Cultures negative to date. Sodium at 142. REVIEW OF SYSTEMS:  Unable to obtain unresponsive. Physical Exam:    Vitals: BP (!) 155/69   Pulse 88   Temp 97.9 °F (36.6 °C)   Resp 16   Ht 5' 5\" (1.651 m)   Wt 171 lb 1.2 oz (77.6 kg)   SpO2 100%   BMI 28.47 kg/m²     Last Body weight:   Wt Readings from Last 3 Encounters:   07/01/22 171 lb 1.2 oz (77.6 kg)       Body Mass Index : Body mass index is 28.47 kg/m².       Intake and Output summary:     Intake/Output Summary (Last 24 hours) at 7/2/2022 1239  Last data filed at 7/2/2022 1100  Gross per 24 hour   Intake 5001.6 ml   Output 2445 ml   Net 2556.6 ml       Physical Examination:     Gen:  Mild acute distress on the vent   Eyes: PERRL. Anicteric sclera. No conjunctival injection. ENT: No discharge. Posterior oropharynx clear. External appearance of ears and nose normal.  NG tube in place coffee-ground drainage  Neck: Trachea midline. No mass   Resp: Clear bilaterally no active wheezing rales or rhonchi. CV: Regular rate. Regular rhythm. No murmur or rub. No edema. GI: Soft, Non-tender. Non-distended. +BS  Skin: Warm, dry, w/o erythema. Lymph: No cervical or supraclavicular LAD. M/S: No cyanosis. No clubbing. Neuro: Sedated on the vent, no active seizure, withdraw from pain, does not follow commands. Laboratory findings:-    CBC:   Recent Labs     07/02/22  0750   WBC 17.0*   HGB 13.8        BMP:    Recent Labs     06/30/22  1145 06/30/22  1510 07/01/22  0400 07/01/22  0605 07/01/22  1545 07/01/22  2225 07/02/22  1136      < > 150*   < > 147*   < > 145   K 3.9  --  3.4*   < > 3.3*  --   --       < > 113*   < > 112*  --   --    CO2 23   < > 27   < > 23  --   --    BUN 16   < > 17   < > 17  --   --    CREATININE 1.2  --  1.4*  --  1.5*  --   --    GLUCOSE 152*   < > 130*   < > 118*  --   --     < > = values in this interval not displayed. S. Calcium:  Recent Labs     07/01/22  1545   CALCIUM 8.1*           Radiology Review:  Pertinent images / reports were reviewed as a part of this visit. CT head results reviewed. Impression:   1. Stable intraparenchymal and intraventricular hemorrhage as compared to    the prior study of 7/1/2022. Stable associated midline shift to the left.     No new areas of hemorrhage are noted.         Critical Care time 35 min           Bonereza Tipton MD, M.D.            7/2/2022, 12:39 PM

## 2022-07-02 NOTE — PROGRESS NOTES
Patient was seen and examine in the ICU. This is a 66-year-old female who was admitted on 6/30/2022 after she was found unresponsive per her family. Patient was intubated upon his arrival to the ER. The brain CT showed: Acute intra cerebral (right basal ganglion )and intraventricular hemorrhage. Patient underwent EVD placement on 6/30/2022. Patient underwent right sided craniotomy and evacuation of right sided intracerebral hemorrhage on 7/1/2022. In today visit:     Patient is still intubated and sedated. GCS: 6T.  Pupils equal and reactive. Has cough reflex. Has corneal reflex. Today brain CT showed:      Again seen is a large amount of blood within the ventricular system    associated with ventricular dilatation. The findings are not significantly    changed as compared to the previous examination. A right-sided ventricular drainage catheter remains in place with is tip    in the medial aspect of the frontal horn of the right lateral ventricle. There is pneumocephalus and a small amount of hemorrhage within the right    temporal lobe at the site of prior surgery. No new foci of intracranial hemorrhage are present. There is diffusely decreased density of the periventricular white matter    which may be secondary to transependymal flow of cerebrospinal fluid. The perimesencephalic cisterns are well maintained. There is stable midline shift to the left of approximately 6 mm.       Postcraniotomy changes are noted on the right. There is mild mucosal thickening within the left sphenoid sinus and    bilateral maxillary sinuses. Patient recommendation treatment plan from neurosurgical perspective:     · Medical management per ICU team.  · Seizure precaution. · Follow-up the recommendation of stroke/neurology team.  · Keep systolic blood pressure less than 160 and above 100.   · Hypertonic 3 % hypertonic saline with target of sodium 145-150  · New brain CT tomorrow morning.     - EVD management:     ·  Keep the ICP less than  20 mm HG  · Keep systolic blood pressure more than 100  mm HG  · Keep Cerebral perfusion pressure (CPP)  more than 60 mm HG  · Keep blood glucose from  mg  · Open the EVD at the level of the 10 CM. · tPA irrigation in EVD today.        -The case was discussed in detail with the ICU team.  -Neurosurgery will follow. *Time spent was at least 35 min of critical time evaluating patient, discussion with staff, planning for treatment and evaluation. The time was spent examining patient face to face, reviewing the images personally, reviewing the chart, perform high complexity decision making and speaking with the nursing staff regarding recommendations. There was a high probability of clinically significant life-threatening deterioration of the patient's condition requiring my urgent intervention.

## 2022-07-02 NOTE — PROGRESS NOTES
Patient Weaning Progress    The patient's vent settings was not able to be weaned this shift. Ventilator settings that were weaned              [] Mode   [] Pressure support weaned   [] Fio2 weaned   [] Peep weaned             Spontaneous weaning trial  was not attempted. Evac tube was  hooked up with continuous low suction(20-30mmHg)      Cuff was not  deflated to determine cuff leak.   *Specific details of weaning located in Ventilator documentation flowsheets*

## 2022-07-02 NOTE — OP NOTE
[address] [id number]  RECORD OF OPERATION    Patient name: Christopher Mckinney  Medical Record Number: [id number]  Account Number: [de-identified]      Date of Procedure: 7/1/2022      Pre-operative Diagnosis:      · Spontaneous large acute intracerebral bleed with extension to the intraventricular system. · Brain midline shift about 7 mm  · Rapid decline in neurological status. · Brain edema. · Pamela compression. Post-operative Diagnosis: The same      PROCEDURE:      ·           Right sided craniotomy. ·           Imaging guided partial evacuation of right sided intra parenchymal hemorrhage. ·           Using the surgical microscope. ·           Using the intraoperative neuro navigation. ·           Application and removal of 3-points Kohli head fixation system. Anesthesia: General endotracheal     Surgeon: Angelito Andres MD   Assistant: Marily Hancock PA-C     Estimated Blood Loss: about 100 ml     Drains: One subgaleal drain LEODAN drain     Blood Transfusions: None      Complications: none immediately appreciated     Specimens: None. Indications for Procedure:      (Please see my consultation note and progress note for this patient for further information). This is a 64-year-old female who was brought to the ER after she was found unresponsive by her family. Upon patient arrival to the ER, she underwent a brain CT that showed large right sided intraparenchymal hemorrhage with extension to the ventricular system to the associated with a significant midline shift (about 7 mm). -Patient underwent EVD placement yesterday. - I discussed the case thoroughly with the ICU team (Dr. Megha Juarez ). We both in agreement that the condition has a poor prognosis. However, one  treatment option from neurosurgical perspective is to perform an emergency right sided craniotomy for evacuation of patient intra cerebral hemorrhage as alife saving procedures. This possible surgical intervention was discussed in detail with patient's family including the associated risks and benefits and realistic expectations. the poor prognosis of this condition was emphasized to the patient family based on patient current surgical status and the findings of her neuroimaging study. This discussion was carried out with patient family in front of ICU team (ICU NP, Marlen Valiente) and the patient's nurse. All questions and concerns were addressed and answered.  -Patient family agreed to proceed with the recommended surgical intervention. PROCEDURE:      The patient was brought to the OR. She was placed  supine. General anesthesia was inducted, IV line, Sigala catheter were  inserted and maintained. Then we turned the patient's head to the left  side and the  patient's head was placed in [place] 3 points  head fixation system. We placed under the right shoulder, a shoulder roll. Then, we shaved the right side of her head. Next, we did registration between the patient and neuronavigation system. Next, we used neuro-naviagtion system to localize the optimal location of the skin incision and the location of the up coming craniotomy that associated with best trajectory to access the center of the hematoma. We managed to make the skin incision and the upcoming craniectomy. Then, we proceeded with the draping and preparing the patient in the standard  fashion. Then, we proceeded with making a skin incision, which was a  Curved skin incision in right frontoparietal area. Next, a classical right-sided treatment approach was executed  I did a sharp dissection throughout the soft tissue until I reached the bone. The temporal muscle was cut and deflected inferiorly. I exposed the bone and then I made a craniotomy. Next, I elevated the bone flap, and we exposed the dura. Next , I evaluate the location of the hematoma and the best trajectory to reach the center of the hematoma by using the neuro navigation system.  Several lysing dura stitches were applied. Then, I  proceeded with the opening of the dura in a cruciate fashion. I exposed the significant fissure. Next, I brought the microscope. Under the microscope vision, I opened the sylvian fissure by utilizing a microsurgical technique. Next, by utilizing the neuro navigation guidance a I started to make a small  opening in brain parenchyma (1.5 cm) then I followed a bath through the brain parenchyma (again microscopically and under the guidance of neuro navigation system) to reach the deep-seated intraparenchymal hematoma. After I faced the hematoma. I found a fresh clot. I stated to remove  the blood clots by  utilizing a bipolar and microsurgical technique. Several biopsies were taken from the hematoma especially from the edges of the hematoma and sent to permanent pathology exam.  As I satisfied with the amount of blood I removed evident by noticing a significant collapse in the brain inside the skull,  I started copious irrigation and meticulous hemostasis. Next, I proceeded with closing the dura  using a piece of DuraGen then we applied a layer of DuraSeal. Next,  we placed back the bone flap and fixed it with screws and plates. Then, we did copious irrigation. We closed the skin incision in the standard fashion. The patient tolerated the surgery very well without intraoperative complications. At the end of the surgery, the patient was kept intubated and  transferred  to the ICU for further observation and treatment.      *Mindy Delaney PA-C ( Neurosurgery PA) assisted throughout the procedure with positioning, draping, retraction, wound closure, and dressing application     Leopold Baas, MD, MD  Electronically signed by me on 7/2/2022 at 10:44 AM

## 2022-07-02 NOTE — PROGRESS NOTES
Neurology Progress Note    Date:7/2/2022       Room:[facility]12/012-A  Patient Name:Dottie Ceballos     YOB: 1965     Age:56 y.o. Chief Complaint:   Chief Complaint   Patient presents with    Altered Mental Status       Subjective     Abida Benitez is a 60 y.o. female with no known history who presents as a stroke alert activation. Last evening around 530pm the patient was seen normal and went out to eat at that time. When she came back, she was noted to be vomiting and ended up falling to the floor last evening, but the fall was unwitnessed. However, her sister did end up checking on her that evening and the patient was laying on the floor. They checked on her again this morning and she was still laying on the floor. They noted that she was drooling and her body was shaking. At that time, they called 911. The patient was given narcan in route with no response. On arrival, she was taken for a head CT and was noted to have a large intraparenchymal hemorrhage with extension into her ventricular system. She underwent a CTA which was negative for aneurysm. She was subsequently intubated in the emergency department for airway protection. While in the ER, she was noted to have increased tone and myoclonic movements of her right upper extremity. There were also reports of seizure activity per EMS. The patient is currently in the ICU intubated on propofol. There has been no further seizure activity and the tone in the right upper extremity is normal. The patient is moving her right upper extremity spontaneously, but is not following commands. Interval history 7/2/22: The patient underwent EVD placement followed by craniotomy and hematoma evacuation. No seizures.      Review of Systems   Review of Systems   Unable to perform ROS: Intubated     Medications   Scheduled Meds:    sodium chloride flush  5-40 mL IntraVENous 2 times per day    ceFAZolin  2,000 mg IntraVENous Q8H    sodium chloride  1,000 mL Withdrawal to noxious stimuli in right upper and bilateral lower extremities. Posturing noted with noxious stimulus to left upper extremity. Labs/Imaging/Diagnostics   Labs:  CBC:  Recent Labs     07/01/22  0400 07/01/22  1545 07/02/22  0750   WBC 16.6* 14.7* 17.0*   RBC 5.01 4.69 4.40   HGB 15.8 14.7 13.8   HCT 48.8* 45.7 43.0   MCV 97.4 97.4 97.7    224 226     CHEMISTRIES:  Recent Labs     06/30/22  1145 06/30/22  1510 07/01/22  0400 07/01/22  0605 07/01/22  1545 07/01/22  2225 07/02/22  0220 07/02/22  0430 07/02/22  0750      < > 150*   < > 147*   < > 144 144 142   K 3.9  --  3.4*  --  3.3*  --   --   --   --      --  113*  --  112*  --   --   --   --    CO2 23  --  27  --  23  --   --   --   --    BUN 16  --  17  --  17  --   --   --   --    CREATININE 1.2  --  1.4*  --  1.5*  --   --   --   --    GLUCOSE 152*  --  130*  --  118*  --   --   --   --     < > = values in this interval not displayed. COAGULATION STUDIES:  Recent Labs     06/30/22  1145   INR 1.08     LIVER PROFILE:  Recent Labs     06/30/22  1145   AST 33   ALT 19   BILITOT 0.5   ALKPHOS 95     CHOLESTEROL AND A1C:  Recent Labs     07/01/22  0400   LABA1C 6.1      Imaging Last 24 Hours:  CT HEAD WO CONTRAST    Result Date: 7/2/2022  CT head without contrast Comparison: CT,SR - CT HEAD WO CONTRAST - 07/01/2022 02:55 PM EDT Findings: Again seen is a large amount of blood within the ventricular system associated with ventricular dilatation. The findings are not significantly changed as compared to the previous examination. A right-sided ventricular drainage catheter remains in place with is tip in the medial aspect of the frontal horn of the right lateral ventricle. There is pneumocephalus and a small amount of hemorrhage within the right temporal lobe at the site of prior surgery. No new foci of intracranial hemorrhage are present.  There is diffusely decreased density of the periventricular white matter which may be secondary to transependymal flow of cerebrospinal fluid. The perimesencephalic cisterns are well maintained. There is stable midline shift to the left of approximately 6 mm. Postcraniotomy changes are noted on the right. There is mild mucosal thickening within the left sphenoid sinus and bilateral maxillary sinuses. Impression: 1. Stable intraparenchymal and intraventricular hemorrhage as compared to the prior study of 7/1/2022. Stable associated midline shift to the left. No new areas of hemorrhage are noted. This document has been electronically signed by: Destinee Tavarez MD on 07/02/2022 08:34 AM All CTs at this facility use dose modulation techniques and iterative reconstructions, and/or weight-based dosing when appropriate to reduce radiation to a low as reasonably achievable. CT HEAD WO CONTRAST    Result Date: 7/1/2022  PROCEDURE: CT HEAD WO CONTRAST CLINICAL INFORMATION: Post craniotomy and evacuation of intracranial hemorrhage., To be imaged on the way to ICU. COMPARISON: CT scan of the brain dated 1 July 2022. TECHNIQUE: Noncontrast 5 mm axial images were obtained through the brain. Sagittal and coronal reconstructions were obtained. All CT scans at this facility use dose modulation, iterative reconstruction, and/or weight-based dosing when appropriate to reduce radiation dose to as low as reasonably achievable. FINDINGS: The patient has undergone right temporal craniotomy and evacuation of the hemorrhage in the right temporal lobe. There is a ventricular shunt entering the calvarium in the right frontal region. Tip of the shunt is just anterior to the third ventricle. There is extensive hemorrhage within the frontal horn of the right lateral ventricle, dependent portions of the right and left lateral ventricles, in the third and fourth ventricles. There is moderate dilatation of the lateral ventricles especially the temporal horns.  There is extensive diminished attenuation in the white matter suggestive off edema There are inflammatory changes in the left sphenoid sinus and to lesser extent ethmoid air cells bilaterally. The remaining paranasal sinuses and mastoid air cells are normally aerated. There is proptosis on the left side. There is increased soft tissue density in the nasopharynx consistent with enlarged adenoids. 1. Status post right temporal craniotomy and evacuation of hemorrhage in the right temporal lobe. 2. Intraventricular shunt entering the calvarium in the right frontal region with the tip of the shunt just anterior to the third ventricle. 3. Extensive hemorrhage in the frontal horn of the right lateral ventricle, dependent portions of the right and left lateral ventricles, third and fourth ventricles. Moderate dilatation of the lateral ventricles especially the temporal horns. 4. Diminished attenuation in the white matter suggestive of edema. 5.Proptosis on the left side. 6. Inflammatory changes in the left sphenoid sinus and to lesser extent in the ethmoid air cells bilaterally. 7. Enlarged adenoids. **This report has been created using voice recognition software. It may contain minor errors which are inherent in voice recognition technology. ** Final report electronically signed by DR Delores Cage on 7/1/2022 3:38 PM    CT HEAD WO CONTRAST    Result Date: 7/1/2022  CT head without contrast. Comparison: CT,SR - CT HEAD WO CONTRAST - 06/30/2022 05:11 PM EDT . Findings: Right anterior basal ganglia acute parenchymal hemorrhage 4.0 x 3.1 x 2.4 cm. Previously 4.8 x 3.1 x 2.1 cm. Acute blood throughout the ventricles, and a right frontal ventriculostomy are again noted. Leftward midline shift of septum pellucidum 7 mm, previously 6 mm. Small amount of pneumocephalus within the ventricular system again noted. ET and OG tubes in place. No acute fractures. Orbits, sinuses, and mastoids unremarkable. Impression: 1. Slightly decreased hemorrhage within the right basal ganglia.  Additional findings are relatively similar to the prior exam. This document has been electronically signed by: Lyn Moore MD on 07/01/2022 04:53 AM All CTs at this facility use dose modulation techniques and iterative reconstructions, and/or weight-based dosing when appropriate to reduce radiation to a low as reasonably achievable. CT HEAD WO CONTRAST    Result Date: 6/30/2022  CT head without contrast Comparison: CT,KOSR - CT HEAD WO CONTRAST - 06/30/2022 11:34 AM EDT Findings: 3.4 x 4.7 cm acute intraparenchymal hematoma centered within the right basal ganglia without change. This is associated with approximately 7 mm of midline shift to the left without significant change. There is a large intraventricular hematoma extending throughout the ventricular system, without significant change. There is a new ventriculostomy tube with its tip overlying the foramina of [place]. Ventriculomegaly is without significant change. There is mild pneumocephalus at the level of the right lateral ventricle, new since the prior study. There is extensive severe cerebral edema, similar to the prior study. Intravenous contrast is present from a recent CTA. Mild mucosal thickening within the left sphenoid sinus. Clear bilateral mastoid air cells. The orbits are within normal limits. There is no acute fracture. 1. Large right-sided intraparenchymal hematoma and large hematoma throughout the ventricular system with mild leftward midline shift and mild ventriculomegaly, similar to the prior study. 2. There is a new ventriculostomy tube overlying the foramina of [place]. There is a small amount of gas within the right lateral ventricle. 3. There is severe cerebral edema, similar to the prior study.  This document has been electronically signed by: Eber Carcamo MD on 06/30/2022 06:01 PM All CTs at this facility use dose modulation techniques and iterative reconstructions, and/or weight-based dosing when appropriate to reduce radiation to a low as reasonably achievable. Assessment and Plan:        1. Hemorrhagic stroke with intraventricular extension originating from the right basal ganglia. Focal seizures. · CTA without evidence of aneurysm  · Defer further imaging to neurosurgery team  · Systolic blood pressure goal 100-140  · We will defer sodium goal to neurosurgery team  · Continue Keppra 1 g twice daily. · IV Ativan as needed for acute seizures. Order placed for Ativan 2 mg as needed. Call with any additional seizure activity. · Neurology following    This case was discussed with Dr. Sharon Lopez and he is in agreement with the assessment and plan.     Electronically signed by Tania Bermudez PA-C on 7/2/22 at 1:40 PM EDT

## 2022-07-02 NOTE — PLAN OF CARE
Problem: Respiratory - Adult  Goal: Achieves optimal ventilation and oxygenation  Outcome: Progressing  Flowsheets (Taken 7/2/2022 4618)  Achieves optimal ventilation and oxygenation:   Assess for changes in respiratory status   Position to facilitate oxygenation and minimize respiratory effort   Respiratory therapy support as indicated  Goal: Able to breathe comfortably  Description: Able to breathe comfortably  Outcome: Progressing                                                Patient Weaning Progress    The patient's vent settings was not able to be weaned this shift.       Ventilator settings that were weaned              [] Mode   [] Pressure support weaned   [] Fio2 weaned   [] Peep weaned

## 2022-07-03 NOTE — PLAN OF CARE
Problem: Discharge Planning  Goal: Discharge to home or other facility with appropriate resources  Outcome: Not Progressing     Problem: Respiratory - Adult  Goal: Able to breathe comfortably  Description: Able to breathe comfortably  7/3/2022 1404 by Sri Main RCP  Outcome: Not Progressing     Problem: Respiratory - Adult  Goal: Able to breathe comfortably  Description: Able to breathe comfortably  7/3/2022 1404 by Sri Main RCP  Outcome: Not Progressing     Problem: Respiratory - Adult  Goal: Achieves optimal ventilation and oxygenation  7/3/2022 1623 by Santos Grant RN  Outcome: Progressing  7/3/2022 1404 by Sri Main RCP  Outcome: Not Progressing     Problem: Pain  Goal: Verbalizes/displays adequate comfort level or baseline comfort level  Outcome: Progressing     Problem: Safety - Adult  Goal: Free from fall injury  Outcome: Progressing     Problem: ABCDS Injury Assessment  Goal: Absence of physical injury  Outcome: Progressing     Problem: Skin/Tissue Integrity  Goal: Absence of new skin breakdown  Description: 1. Monitor for areas of redness and/or skin breakdown  2. Assess vascular access sites hourly  3. Every 4-6 hours minimum:  Change oxygen saturation probe site  4. Every 4-6 hours:  If on nasal continuous positive airway pressure, respiratory therapy assess nares and determine need for appliance change or resting period. Outcome: Progressing     Problem: Nutrition Deficit:  Goal: Optimize nutritional status  7/3/2022 1623 by Santos Grant RN  Outcome: Progressing   Care plan reviewed with pt's brother. Pt's brother Melinda High verbalize understanding of the plan of care and contribute to goal setting.

## 2022-07-03 NOTE — PROGRESS NOTES
Preparing patient to go to CT. Patient becoming hypotensive and bradycardic. Provider notified. 0045 - Patient heart rate went from sinus rhythm to junctional rhythm with long pauses. Defibrillator pads applied. Atropine given with good response. See MAR for med administration. 0115 - Patient stabilized and transported to CT. Patient tolerated well.

## 2022-07-03 NOTE — PROGRESS NOTES
Comprehensive Nutrition Assessment    Type and Reason for Visit:  Reassess,Consult (tube feeding ordering and management; tube feed start OK per Dr Sherif Cope)    Nutrition Recommendations/Plan:   1. When able to start tube feeding, begin Pivot at 15ml/ hour, increase by 10ml every 8 hours as tolerated to goal 45ml/ hour  2. Additional free water flush as per Doctor     Malnutrition Assessment:  Malnutrition Status:  Insufficient data (07/03/22 0999)    Context:  Acute Illness     Findings of the 6 clinical characteristics of malnutrition:  Energy Intake:  Unable to assess  Weight Loss:  Unable to assess (no weight records per EMR)     Body Fat Loss:  No significant body fat loss     Muscle Mass Loss:  No significant muscle mass loss    Fluid Accumulation:  Unable to assess     Strength:  Not Performed    Nutrition Assessment:      Pt. nutritionally compromised AEB NPO status due to intubation. At risk for further nutrition compromise r/t admit with intracranial hemorrhage, 6/30 right frontal bur hole and EVD placed, 7/1 craniotomy with hematoma evacuation, respiratory failure, hypertensive emergency, seizure (no known past medical history).       Nutrition Related Findings:    Pt. Report/Treatments/Miscellaneous: intubated 6/30, OG tube, OK per Dr Sherif Cope for tube feeding start, RN reports possible return to surgery and thus will not start tube feeding until plan determined  GI Status: BM 0   Pertinent Labs: Sodium 156, Potassium 3.7, BUN 13, Creatinine 1.2, Glucose 107  Pertinent Meds: ancef, keppra, dopamine, lactated ringers     Wound Type: Surgical Incision (head incision 6/30: frontal matt hole, EVD placed; 7/1: craniotomy with hematoma evacuation)       Current Nutrition Intake & Therapies:          Diet NPO  ADULT TUBE FEEDING; Orogastric; Immune Enhancing; Continuous; 15; Yes; 10; Q 8 hours; 45; 30; Q 3 hours  Current Tube Feeding (TF) Orders:  · Feeding Route: Orogastric  · Formula: Immune Enhancing  · Schedule: Continuous  · Feeding Regimen: Pivot at 15ml/ hour, increase by 10ml every 8 hours as tolerated to goal 45ml/ hour  · Additives/Modulars: None  · Water Flushes: as per Doctor  · Goal TF & Flush Orders Provides: 1620 kcals, 101 grams protein, 186 grams CHO, 8 grams fiber, 810ml free water in 1080ml volume/ 24 hours      Anthropometric Measures:  Height: 5' 5\" (165.1 cm)  Ideal Body Weight (IBW): 125 lbs (57 kg)    Admission Body Weight: 171 lb 1.2 oz (77.6 kg) (6/30; trace edema)  Current Body Weight: 171 lb 1.2 oz (77.6 kg) (7/1; trace edema),     Current BMI (kg/m2): 28.5  Usual Body Weight:  (no weight records per EMR)                       BMI Categories: Overweight (BMI 25.0-29. 9)    Estimated Daily Nutrient Needs:  Energy Requirements Based On: Kcal/kg  Weight Used for Energy Requirements: Admission (78kgm on 6/30)  Energy (kcal/day): 8125-3742 kcals (20-25)  Weight Used for Protein Requirements: Ideal (57kgm)  Protein (g/day):  grams (1.2-2)  Method Used for Fluid Requirements: Other (Comment)  Fluid (ml/day): as per Doctor    Nutrition Diagnosis:   · Inadequate oral intake related to impaired respiratory function as evidenced by NPO or clear liquid status due to medical condition,intubation      Nutrition Interventions:   Food and/or Nutrient Delivery: Continue NPO,Start Tube Feeding  Nutrition Education/Counseling: No recommendation at this time  Coordination of Nutrition Care: Continue to monitor while inpatient,Interdisciplinary Rounds       Goals:     Goals: Tolerate nutrition support at goal rate,by next RD assessment       Nutrition Monitoring and Evaluation:      Food/Nutrient Intake Outcomes: Enteral Nutrition Intake/Tolerance  Physical Signs/Symptoms Outcomes: Biochemical Data,Fluid Status or Edema,Hemodynamic Status,Nutrition Focused Physical Findings,Skin,Weight,GI Status    Discharge Planning:     Too soon to determine     Divine Ahuja RD, LD  Contact: [phone]

## 2022-07-03 NOTE — PROGRESS NOTES
no longer withdrawing in her extremities. She is currently off sedation and is on dopamine. Review of Systems   Review of Systems   Unable to perform ROS: Intubated     Medications   Scheduled Meds:    pantoprazole  40 mg IntraVENous BID    sodium chloride flush  5-40 mL IntraVENous 2 times per day    ceFAZolin  2,000 mg IntraVENous Q8H    sodium chloride  1,000 mL IntraVENous Once    levETIRAcetam  1,000 mg IntraVENous Q12H     Continuous Infusions:    DOPamine 2.5 mcg/kg/min (22 0640)    lactated ringers 100 mL/hr at 22 0825    [Held by provider] sodium chloride Stopped (22)    sodium chloride       PRN Meds:   Medications Prior to Admission:   No current facility-administered medications on file prior to encounter. No current outpatient medications on file prior to encounter. Past History    Past Medical History:   has no past medical history on file. Social History:        Family History: History reviewed. No pertinent family history. Physical Examination      Vitals:  /82   Pulse (!) 112   Temp 97.3 °F (36.3 °C)   Resp 16   Ht 5' 5\" (1.651 m)   Wt 171 lb 1.2 oz (77.6 kg)   SpO2 96%   BMI 28.47 kg/m²   Temp (24hrs), Av.9 °F (36.1 °C), Min:96.1 °F (35.6 °C), Max:97.3 °F (36.3 °C)    Intracerebral Hemorrhage Score  Randall Coma Scale = 8 1   ICH Volume >= 30 mL. 34.3mL 1   Intraventricular Hemorrhage 1   Infratentorial Origin of Hemorrhage 0   Age => 78 0   TOTAL 3     30 day mortality  Score 0 = 0%  Score 1 = 13%  Score 2 = 26%  Score 3 = 72%  Score 4 = 97%  Score 5 = 100%  Score 6 = 100%    Modified Al Scale: 0      I/O (24Hr): Intake/Output Summary (Last 24 hours) at 7/3/2022 0919  Last data filed at 7/3/2022 0700  Gross per 24 hour   Intake 2755.41 ml   Output 4363 ml   Net -1607.59 ml         Physical Exam  Vitals reviewed. Constitutional:       General: She is not in acute distress. Appearance: She is ill-appearing.       Comments: Ill-appearing. Intubated on ventilator. HENT:      Head:      Comments: Dressing/drain noted right aspect of head      Neurologic Exam     Mental Status   Level of consciousness: unresponsive to painful stimuli    Cranial Nerves     CN III, IV, VI   Right pupil: Size: 6 mm. Reactivity: non-reactive. Left pupil: Size: 6 mm. Reactivity: non-reactive. No adventitious motor movements     Motor Exam   No withdrawal to noxious stimuli. No spontaneous movements. Labs/Imaging/Diagnostics   Labs:  CBC:  Recent Labs     07/01/22  1545 07/02/22  0750 07/03/22  0050   WBC 14.7* 17.0* 14.8*   RBC 4.69 4.40 3.98*   HGB 14.7 13.8 12.5   HCT 45.7 43.0 39.8   MCV 97.4 97.7 100.0*    226 209     CHEMISTRIES:  Recent Labs     07/02/22  2240 07/02/22  2240 07/03/22  0050 07/03/22  0050 07/03/22  0300 07/03/22  0450 07/03/22  0740   *   < > 151*   < > 154* 156* 159*   K 2.8*  --  3.3*  --   --  3.7  --    *  --  118*  --   --  124*  --    CO2 23  --  24  --   --  23  --    BUN 14  --  14  --   --  13  --    CREATININE 1.3*  --  1.2  --   --  1.2  --    GLUCOSE 123*  --  120*  --   --  107  --    PHOS  --   --  2.8  --   --   --   --    MG 1.7  --  1.7  --   --   --   --     < > = values in this interval not displayed. COAGULATION STUDIES:  Recent Labs     06/30/22  1145   INR 1.08     LIVER PROFILE:  Recent Labs     06/30/22  1145 07/03/22  0050   AST 33 19   ALT 19 6*   BILITOT 0.5 0.3   ALKPHOS 95 54     CHOLESTEROL AND A1C:  Recent Labs     07/01/22  0400   LABA1C 6.1      Imaging Last 24 Hours:  CT HEAD WO CONTRAST    Result Date: 7/3/2022  ** ADDENDUM #1 ** This report was discussed with Juice Johnson RN on Jul 03, 2022 02:13:00 EDT. This document has been electronically signed by: Tanisha Wise on 07/03/2022 02:13 AM ** ORIGINAL REPORT ** CT head without IV contrast: Comparison: CT,SR - CT HEAD WO CONTRAST - 07/02/2022 04:45 AM EDT Findings: Right craniectomy change.  Right ventriculostomy thickening within the left sphenoid sinus and bilateral maxillary sinuses. Impression: 1. Stable intraparenchymal and intraventricular hemorrhage as compared to the prior study of 7/1/2022. Stable associated midline shift to the left. No new areas of hemorrhage are noted. This document has been electronically signed by: Myrtle Gould MD on 07/02/2022 08:34 AM All CTs at this facility use dose modulation techniques and iterative reconstructions, and/or weight-based dosing when appropriate to reduce radiation to a low as reasonably achievable. CT HEAD WO CONTRAST    Result Date: 7/1/2022  PROCEDURE: CT HEAD WO CONTRAST CLINICAL INFORMATION: Post craniotomy and evacuation of intracranial hemorrhage., To be imaged on the way to ICU. COMPARISON: CT scan of the brain dated 1 July 2022. TECHNIQUE: Noncontrast 5 mm axial images were obtained through the brain. Sagittal and coronal reconstructions were obtained. All CT scans at this facility use dose modulation, iterative reconstruction, and/or weight-based dosing when appropriate to reduce radiation dose to as low as reasonably achievable. FINDINGS: The patient has undergone right temporal craniotomy and evacuation of the hemorrhage in the right temporal lobe. There is a ventricular shunt entering the calvarium in the right frontal region. Tip of the shunt is just anterior to the third ventricle. There is extensive hemorrhage within the frontal horn of the right lateral ventricle, dependent portions of the right and left lateral ventricles, in the third and fourth ventricles. There is moderate dilatation of the lateral ventricles especially the temporal horns. There is extensive diminished attenuation in the white matter suggestive off edema There are inflammatory changes in the left sphenoid sinus and to lesser extent ethmoid air cells bilaterally. The remaining paranasal sinuses and mastoid air cells are normally aerated. There is proptosis on the left side. There is increased soft tissue density in the nasopharynx consistent with enlarged adenoids. 1. Status post right temporal craniotomy and evacuation of hemorrhage in the right temporal lobe. 2. Intraventricular shunt entering the calvarium in the right frontal region with the tip of the shunt just anterior to the third ventricle. 3. Extensive hemorrhage in the frontal horn of the right lateral ventricle, dependent portions of the right and left lateral ventricles, third and fourth ventricles. Moderate dilatation of the lateral ventricles especially the temporal horns. 4. Diminished attenuation in the white matter suggestive of edema. 5.Proptosis on the left side. 6. Inflammatory changes in the left sphenoid sinus and to lesser extent in the ethmoid air cells bilaterally. 7. Enlarged adenoids. **This report has been created using voice recognition software. It may contain minor errors which are inherent in voice recognition technology. ** Final report electronically signed by DR Delores Cage on 7/1/2022 3:38 PM        Assessment and Plan:        1. Hemorrhagic stroke with intraventricular extension originating from the right basal ganglia. Focal seizures. · CTA without evidence of aneurysm  · Defer further imaging to neurosurgery team  · Systolic blood pressure goal 100-140  · We will defer sodium goal to neurosurgery team  · Given neurologic decline overnight, increase Keppra to 1.5g twice daily. 2g bolus given. · Stat EEG ordered  · IV Ativan as needed for acute seizures. Order placed for Ativan 2 mg as needed. Call with any additional seizure activity. · Neurology following    This case was discussed with Dr. Barry Schneider and he is in agreement with the assessment and plan.     Electronically signed by Nancy Ledesma PA-C on 7/3/22 at 1:46 PM EDT

## 2022-07-03 NOTE — PLAN OF CARE
Problem: Respiratory - Adult  Goal: Achieves optimal ventilation and oxygenation  Outcome: Not Progressing     Problem: Respiratory - Adult  Goal: Able to breathe comfortably  Description: Able to breathe comfortably  Outcome: Not Progressing   Vent setting optimized to achieve target tidal volume, respiratory rate and ideal oxygen saturations. SBT will be performed when appropriate. Patient Weaning Progress    The patient's vent settings was not able to be weaned this shift. Ventilator settings that were weaned              [] Mode   [] Pressure support weaned   [] Fio2 weaned   [] Peep weaned             Spontaneous weaning trial  was not attempted. Evac tube was  hooked up with continuous low suction(20-30mmHg)      Cuff was not  deflated to determine cuff leak.    Pt has no cough or gag at this time, CT scan and EEG planned for this afternoon  *Specific details of weaning located in Ventilator documentation flowsheets*

## 2022-07-03 NOTE — PROGRESS NOTES
Attending Note:     Please see my today separate progress note. Nagi Freedman MD      Neurosurgery Progress Note    Patient:  Emilee Gonzalez      Unit/Bed:4D-12/012-A    YOB: 1965    MRN: [id number]     Acct: [de-identified]     Admit date: 6/30/2022    Chief Complaint   Patient presents with    Altered Mental Status       Patient Seen, Chart, Physician notes, Labs, Radiology studies reviewed. Subjective: Patient is seen and evaluated in the ICU setting with evaluation exam findings reviewed and discussed with Dr. Yesy Salomon and with nursing. Patient appeared to be resting comfortably but was intubated and sedated limiting a thorough exam        Past, Family, Social History unchanged from admission. Diet:  Diet NPO  ADULT TUBE FEEDING; Orogastric; Immune Enhancing; Continuous; 15; Yes; 10; Q 8 hours; 45; 30; Q 3 hours    Medications:  Scheduled Meds:   propofol  100 mg IntraVENous Once    pantoprazole  40 mg IntraVENous BID    sodium chloride flush  5-40 mL IntraVENous 2 times per day    ceFAZolin  2,000 mg IntraVENous Q8H    sodium chloride  1,000 mL IntraVENous Once    levETIRAcetam  1,000 mg IntraVENous Q12H     Continuous Infusions:   DOPamine Stopped (07/03/22 1049)    lactated ringers 200 mL/hr at 07/03/22 1015    propofol 20 mcg/kg/min (07/03/22 1211)    [Held by provider] sodium chloride Stopped (07/02/22 2125)    sodium chloride       PRN Meds:magnesium sulfate, potassium chloride **OR** potassium chloride, sodium chloride flush, sodium chloride, morphine **OR** morphine, LORazepam    Objective: Patient is observed lying in bed with head of the bed mildly elevated and was intubated and sedated limiting a thorough physical exam.  Dressings are intact over flat dry incision surgical drain output noted at 28 cc per shift. EVD remains level to 10 cm of H2O.     Vitals: BP (!) 158/92   Pulse (!) 123   Temp 97.3 °F (36.3 °C)   Resp (!) 9   Ht 5' 5\" (1.651 m)   Wt 171 lb 1.2 oz (77.6 kg)   SpO2 97%   BMI 28.47 kg/m²     Physical Exam     Physical Exam:  Debated and sedated limiting a thorough exam.  Pupils equal.  Facial strength symmetric. Review of Systems   A comprehensive review of systems unobtainable due to patient status  24 hour intake/output:    Intake/Output Summary (Last 24 hours) at 7/3/2022 1218  Last data filed at 7/3/2022 1006  Gross per 24 hour   Intake 1887.62 ml   Output 5063 ml   Net -3175.38 ml     Last 3 weights: Wt Readings from Last 3 Encounters:   07/01/22 171 lb 1.2 oz (77.6 kg)         CBC:   Recent Labs     07/01/22  1545 07/02/22  0750 07/03/22  0050   WBC 14.7* 17.0* 14.8*   HGB 14.7 13.8 12.5    226 209     BMP:    Recent Labs     07/02/22  2240 07/02/22  2240 07/03/22  0050 07/03/22  0300 07/03/22  0450 07/03/22  0740 07/03/22  0940   *   < > 151*   < > 156* 159* 160*   K 2.8*  --  3.3*  --  3.7  --   --    *  --  118*  --  124*  --   --    CO2 23  --  24  --  23  --   --    BUN 14  --  14  --  13  --   --    CREATININE 1.3*  --  1.2  --  1.2  --   --    GLUCOSE 123*  --  120*  --  107  --   --     < > = values in this interval not displayed. Calcium:  Recent Labs     07/03/22  0450   CALCIUM 8.4*     Magnesium:  Recent Labs     07/03/22  0050   MG 1.7     Glucose:No results for input(s): POCGLU in the last 72 hours. HgbA1C:   Recent Labs     07/01/22  0400   LABA1C 6.1     Lipids: No results for input(s): CHOL, TRIG, HDL, LDL, LDLCALC in the last 72 hours. Radiology reports as per the Radiologist  Radiology:  [address] (CODE STROKE)    Result Date: 6/30/2022  WET READ: No hemodynamically significant stenosis is seen in the intracranial and extracranial arteries. See the CT of the head for further details about the findings in the head. Bilateral upper lobe infiltrates, right greater than left. This examination will be formally read by one of the neuro radiologists later today.   Please refer to that report. Lennox Jaffe Dr Milinda Foreman Isibor ON 6/30/2022 12:12 PM. **This report has been created using voice recognition software. It may contain minor errors which are inherent in voice recognition technology. ** PROCEDURE: CTA HEAD W WO CONTRAST, CTA NECK W WO CONTRAST CLINICAL INFORMATION: bleed. COMPARISON: CT scan of the brain obtained on the same day. . TECHNIQUE: 1 mm axial images were obtained through the head and neck after the fast bolus administration of contrast. A noncontrast localizer was obtained. 3-D reconstructions were performed on a dedicated 3-D workstation. These include multiplanar MPR images and multiplanar MIP images. Centerline reconstructions were obtained of the carotid systems. Isovue intravenous contrast was given. All carotid artery measurements are performed utilizing Nascet criteria. This exam was analyzed using [facility] [employer] CT LVO. All CT scans at this facility use dose modulation, iterative reconstruction, and/or weight-based dosing when appropriate to reduce radiation dose to as low as reasonably achievable. FINDINGS: CTA NECK:  Aortic arch and branches: Normal origin of the brachiocephalic, left common carotid and left subclavian arteries from the aortic arch Right common carotid artery/ICA: Punctate calcification at the origin of the right internal carotid artery. No significant hemodynamic stenosis involving the right common and internal carotid arteries. Left common carotid artery/ICA: No significant hemodynamic stenosis involving the left common and internal carotid arteries. Vertebral arteries: Antegrade flow in the right and left vertebral arteries CTA HEAD: Internal carotid arteries: Antegrade flow in the right and left internal carotid arteries. Middle cerebral arteries: Antegrade flow in the middle cerebral arteries bilaterally. Anterior cerebral arteries: Antegrade flow in the anterior cerebral arteries bilaterally. . Vertebral arteries: Antegrade flow in the right and left vertebral arteries Basilar artery: Antegrade flow in the basilar artery. Superior cerebellar arteries: Antegrade flow in the right and left superior cerebellar hemispheres Posterior cerebral arteries: Antegrade flow in the posterior cerebral arteries bilaterally. Patent posterior communicating artery on the right side. No aneurysms, stenoses or occlusions are noted. The superior sagittal sinus, vein of Duncan, internal cerebral veins, straight sinus, transverse sinuses and sigmoid sinuses are patent. Axial source data: Extensive hemorrhage within the right basal ganglia, right and left lateral ventricles, third and fourth ventricles seen best on the noncontrast CT scan. Areas of abnormal density in the right and left lung fields. Slight heterogeneity involving the right and left lobes of the thyroid gland. 1. Essentially negative CTA of the head and neck. 2. Extensive hemorrhage within the right basal ganglia, lateral, third and fourth ventricles seen best on the noncontrast CT scan of the brain. 3. Areas of abnormal density in the right and left lung fields. 4. Slight heterogeneity involving the right and left lobes of the thyroid gland. **This report has been created using voice recognition software. It may contain minor errors which are inherent in voice recognition technology. ** Final report electronically signed by DR Trent Wise on 6/30/2022 12:40 PM    CT HEAD WO CONTRAST    Result Date: 7/1/2022  PROCEDURE: CT HEAD WO CONTRAST CLINICAL INFORMATION: Post craniotomy and evacuation of intracranial hemorrhage., To be imaged on the way to ICU. COMPARISON: CT scan of the brain dated 1 July 2022. TECHNIQUE: Noncontrast 5 mm axial images were obtained through the brain. Sagittal and coronal reconstructions were obtained.  All CT scans at this facility use dose modulation, iterative reconstruction, and/or weight-based dosing when appropriate to reduce radiation dose to as low as reasonably achievable. FINDINGS: The patient has undergone right temporal craniotomy and evacuation of the hemorrhage in the right temporal lobe. There is a ventricular shunt entering the calvarium in the right frontal region. Tip of the shunt is just anterior to the third ventricle. There is extensive hemorrhage within the frontal horn of the right lateral ventricle, dependent portions of the right and left lateral ventricles, in the third and fourth ventricles. There is moderate dilatation of the lateral ventricles especially the temporal horns. There is extensive diminished attenuation in the white matter suggestive off edema There are inflammatory changes in the left sphenoid sinus and to lesser extent ethmoid air cells bilaterally. The remaining paranasal sinuses and mastoid air cells are normally aerated. There is proptosis on the left side. There is increased soft tissue density in the nasopharynx consistent with enlarged adenoids. 1. Status post right temporal craniotomy and evacuation of hemorrhage in the right temporal lobe. 2. Intraventricular shunt entering the calvarium in the right frontal region with the tip of the shunt just anterior to the third ventricle. 3. Extensive hemorrhage in the frontal horn of the right lateral ventricle, dependent portions of the right and left lateral ventricles, third and fourth ventricles. Moderate dilatation of the lateral ventricles especially the temporal horns. 4. Diminished attenuation in the white matter suggestive of edema. 5.Proptosis on the left side. 6. Inflammatory changes in the left sphenoid sinus and to lesser extent in the ethmoid air cells bilaterally. 7. Enlarged adenoids. **This report has been created using voice recognition software. It may contain minor errors which are inherent in voice recognition technology. ** Final report electronically signed by DR Ashley Salazar on 7/1/2022 3:38 PM    CT HEAD WO CONTRAST    Result Date: 7/1/2022  CT head without contrast. Comparison: CT,SR - CT HEAD WO CONTRAST - 06/30/2022 05:11 PM EDT . Findings: Right anterior basal ganglia acute parenchymal hemorrhage 4.0 x 3.1 x 2.4 cm. Previously 4.8 x 3.1 x 2.1 cm. Acute blood throughout the ventricles, and a right frontal ventriculostomy are again noted. Leftward midline shift of septum pellucidum 7 mm, previously 6 mm. Small amount of pneumocephalus within the ventricular system again noted. ET and OG tubes in place. No acute fractures. Orbits, sinuses, and mastoids unremarkable. Impression: 1. Slightly decreased hemorrhage within the right basal ganglia. Additional findings are relatively similar to the prior exam. This document has been electronically signed by: Axel Bonds MD on 07/01/2022 04:53 AM All CTs at this facility use dose modulation techniques and iterative reconstructions, and/or weight-based dosing when appropriate to reduce radiation to a low as reasonably achievable. CT HEAD WO CONTRAST    Result Date: 6/30/2022  CT head without contrast Comparison: CT,[facility] - CT HEAD WO CONTRAST - 06/30/2022 11:34 AM EDT Findings: 3.4 x 4.7 cm acute intraparenchymal hematoma centered within the right basal ganglia without change. This is associated with approximately 7 mm of midline shift to the left without significant change. There is a large intraventricular hematoma extending throughout the ventricular system, without significant change. There is a new ventriculostomy tube with its tip overlying the foramina of [place]. Ventriculomegaly is without significant change. There is mild pneumocephalus at the level of the right lateral ventricle, new since the prior study. There is extensive severe cerebral edema, similar to the prior study. Intravenous contrast is present from a recent CTA. Mild mucosal thickening within the left sphenoid sinus. Clear bilateral mastoid air cells. The orbits are within normal limits. There is no acute fracture.      1. Large right-sided intraparenchymal hematoma and large hematoma throughout the ventricular system with mild leftward midline shift and mild ventriculomegaly, similar to the prior study. 2. There is a new ventriculostomy tube overlying the foramina of [place]. There is a small amount of gas within the right lateral ventricle. 3. There is severe cerebral edema, similar to the prior study. This document has been electronically signed by: Clement Lubin MD on 06/30/2022 06:01 PM All CTs at this facility use dose modulation techniques and iterative reconstructions, and/or weight-based dosing when appropriate to reduce radiation to a low as reasonably achievable. CT Head WO Contrast    Result Date: 6/30/2022  PROCEDURE: CT HEAD WO CONTRAST CLINICAL INFORMATION:altered mental status COMPARISON: None TECHNIQUE: 5 mm axial imaging through the head without IV contrast. All CT scans at this facility use dose modulation, iterative reconstruction, and/or weight based dosing when appropriate to reduce the radiation dose to as low as reasonably achievable. FINDINGS: A 4.8 x 3.4 cm area of intraparenchymal hemorrhage is seen in the right basal ganglion. There is 8 mm midline shift to the left at the level of the septum pellucidum. Intraventricular lobe is also seen. Mild hydrocephalus is noted. There is sulci effacement. The posterior fossa is unremarkable. The craniocervical junction is unremarkable. No acute bony abnormality. The  paranasal sinuses are clear. The  mastoid air cells are clear. The orbits are unremarkable. Marked atrophy of the pituitary gland with empty sella appearance. 1. A 4.8 x 3.4 cm intraparenchymal hemorrhage in the right basal ganglion is noted. 8 mm midline shift to the left at the level of the septum pellucidum. Mild hydrocephalus is seen. Intraventricular blood is also noted. The critical  finding(s) was called to Dr. Baron Valero at 1200 hours on 6/30/2022 by Dr. Kiran Infante.  Verbal acknowledgment and readback was given. **This report has been created using voice recognition software. It may contain minor errors which are inherent in voice recognition technology. ** Final report electronically signed by Dr Jessica Vasquez on 6/30/2022 12:07 PM    CTA NECK W WO CONTRAST    Result Date: 6/30/2022  WET READ: No hemodynamically significant stenosis is seen in the intracranial and extracranial arteries. See the CT of the head for further details about the findings in the head. Bilateral upper lobe infiltrates, right greater than left. This examination will be formally read by one of the neuro radiologists later today. Please refer to that report. Jason Newsome ON 6/30/2022 12:12 PM. **This report has been created using voice recognition software. It may contain minor errors which are inherent in voice recognition technology. ** PROCEDURE: CTA HEAD W WO CONTRAST, CTA NECK W WO CONTRAST CLINICAL INFORMATION: bleed. COMPARISON: CT scan of the brain obtained on the same day. . TECHNIQUE: 1 mm axial images were obtained through the head and neck after the fast bolus administration of contrast. A noncontrast localizer was obtained. 3-D reconstructions were performed on a dedicated 3-D workstation. These include multiplanar MPR images and multiplanar MIP images. Centerline reconstructions were obtained of the carotid systems. Isovue intravenous contrast was given. All carotid artery measurements are performed utilizing Nascet criteria. This exam was analyzed using viz.  contact CT LVO. All CT scans at this facility use dose modulation, iterative reconstruction, and/or weight-based dosing when appropriate to reduce radiation dose to as low as reasonably achievable.  FINDINGS: CTA NECK:  Aortic arch and branches: Normal origin of the brachiocephalic, left common carotid and left subclavian arteries from the aortic arch Right common carotid artery/ICA: Punctate calcification at the origin of the right internal carotid artery. No significant hemodynamic stenosis involving the right common and internal carotid arteries. Left common carotid artery/ICA: No significant hemodynamic stenosis involving the left common and internal carotid arteries. Vertebral arteries: Antegrade flow in the right and left vertebral arteries CTA HEAD: Internal carotid arteries: Antegrade flow in the right and left internal carotid arteries. Middle cerebral arteries: Antegrade flow in the middle cerebral arteries bilaterally. Anterior cerebral arteries: Antegrade flow in the anterior cerebral arteries bilaterally. . Vertebral arteries: Antegrade flow in the right and left vertebral arteries Basilar artery: Antegrade flow in the basilar artery. Superior cerebellar arteries: Antegrade flow in the right and left superior cerebellar hemispheres Posterior cerebral arteries: Antegrade flow in the posterior cerebral arteries bilaterally. Patent posterior communicating artery on the right side. No aneurysms, stenoses or occlusions are noted. The superior sagittal sinus, vein of Duncan, internal cerebral veins, straight sinus, transverse sinuses and sigmoid sinuses are patent. Axial source data: Extensive hemorrhage within the right basal ganglia, right and left lateral ventricles, third and fourth ventricles seen best on the noncontrast CT scan. Areas of abnormal density in the right and left lung fields. Slight heterogeneity involving the right and left lobes of the thyroid gland. 1. Essentially negative CTA of the head and neck. 2. Extensive hemorrhage within the right basal ganglia, lateral, third and fourth ventricles seen best on the noncontrast CT scan of the brain. 3. Areas of abnormal density in the right and left lung fields. 4. Slight heterogeneity involving the right and left lobes of the thyroid gland. **This report has been created using voice recognition software.  It may contain minor errors which are inherent in voice recognition technology. ** Final report electronically signed by DR Simon Oh on 6/30/2022 12:40 PM    XR CHEST PORTABLE    Result Date: 6/30/2022  PROCEDURE: XR CHEST PORTABLE CLINICAL INFORMATION: Line placement TECHNIQUE: Mobile AP chest radiograph. COMPARISON: Mobile AP chest radiograph 8/30/2022 at 12:00 PM FINDINGS: The tip of an endotracheal tube is now approximately 1.9 cm superior to the tommie. A nasogastric tube is in stable position. The tip of a new right-sided internal jugular central venous catheter overlies the cavoatrial junction. Cardiomediastinal silhouette is within normal limits. The bilateral costophrenic angles remain blunted. Alveolar and reticular opacities in the bilateral lungs are not significantly changed. Degenerative changes in the thoracic spine are poorly visualized. 1. Lines and tubes as above. 2. Bilateral atelectasis/infiltrate. 3. Small bilateral pleural effusions. **This report has been created using voice recognition software. It may contain minor errors which are inherent in voice recognition technology. ** Final report electronically signed by Dr. Zeke Aragon on 6/30/2022 1:15 PM    XR CHEST PORTABLE    Result Date: 6/30/2022  PROCEDURE: XR CHEST PORTABLE CLINICAL INFORMATION: altered mental status COMPARISON: None TECHNIQUE: AP portable chest radiograph performed. 1. The tip of the endotracheal tube is in the right mainstem bronchus. The endotracheal tube should be withdrawn about 2 cm. The critical  finding(s) was called to Nalini Her RN in the ER at 1242 hours on 6/30/2022 by Dr. Sj Mo. Verbal acknowledgment and readback was given. 2. Diffuse airspace disease is seen bilaterally. Findings can relate to multilobar pneumonia or pulmonary edema. 3. The nasogastric tube terminates below the diaphragm likely within the stomach. 4. Probable small bilateral pleural effusions. **This report has been created using voice recognition software.   It may contain minor errors which are inherent in voice recognition technology. ** Final report electronically signed by Dr Dwain Jordan on 6/30/2022 12:43 PM                   Assessment: Acute intracerebral (right basal ganglion) and intraventricular hemorrhage    Principal Problem:    Intracranial hemangioma ([address]  Active Problems:    Intracranial hemorrhage ([address]  Resolved Problems:    * No resolved hospital problems. *        Plan: Patient is seen and evaluated in the ICU setting with evaluation exam findings reviewed and discussed with Dr. Carmelina Rose and with nursing. Patient was resting comfortably this morning with flat dry incisions under intact dressings with the surgical drain intact and functioning at less than 30 cc per shift. The EVD was intact and leveled at 10 cm of H2O and was functioning. Patient remains intubated and sedated limiting a thorough physical exam.  She tolerated administration of 2 mg of alteplase in 2 mL of normal saline with the device clamped and nursing instructed to leave the device clamped for 2 hours. [place] following administration of the tPA patient is noted to have experienced a hyperdynamic episode with blood pressure spiking and the patient becoming tachycardic. This was controlled by critical care. Dr. Carmelina Rose was consulted regarding this incident with instructions to leave the EVD clamped for the requisite 2 hours as planned. A CT scan of the head is ordered for review in the morning. Neurosurgery continues to recommend systolic blood pressure be maintained below 160 but above 100.   Neurosurgery to follow-up      Electronically signed by Marily Hancock PA-C on 7/3/2022 at 12:18 PM    Neurosurgery

## 2022-07-03 NOTE — PROGRESS NOTES
Patient:  Abida Market    Unit/Bed:4D-12/012-A  MRN: [id number]   PCP: No primary care provider on file. Date of Admission: 6/30/2022    Assessment and Plan(All pulmonary edema, renal failure, PE, and respiratory failure diagnoses are acute in nature unless otherwise specified):        1. Acute hypoxic respiratory failure: Patient intubated in emergency department 6/30 secondary to inability to maintain airway. Maintain peak pressure of 35 or less and plateau pressure of 30 or less. Mental status precludes extubation. 2. Intraparenchymal hemorrhage: With extension into the ventricular system. TXA was given. Neurosurgery following. Patient taken to the OR on 6/30 for EVD placement. ICP goal <20; SBP Goal <160>100; CPP goal >60. OR for debulking/hematoma evacuation 7/1/22 with NS. Patient had bradycardia and fixed pupils overnight 7/2; [facility] showed stable hemorrhage but progressive worsened cerebral edema. EVD had multiple clots and was not draining; NS aware and plans to use tPA in EVD. Repeat CTH 7/3 AM.   3. Diabetes Insipidus: 4L urine output overnight. DDAVP given. Will need to monitor closely as cerebral edema concerns will likely complicate treatment. Q2hr sodiums. 4. Seizures: Patient presented as a code stroke. Neurology saw patient in the emergency department where he was noted she had a focal seizure. She was loaded with 4.5 g of Keppra. 1000 mg Keppra twice daily per neurology. EEG pending. Neurology following. 5. TOSIN: Monitor & trend. Avoid nephrotoxins. 6. Hypertensive Emergency: Systolic greater than 975 on arrival to the ED. Nicardipine drip. Systolic blood pressure goal less than 160 but greater than 100. Resolved. 7. Hypokalemia: PRN potassium replacement. 8. Leukocytosis: Likely reactive. Remains afebrile; will trend and monitor for signs of infection. Day 3 of ancef; started 6/30    CC: AMS       HPI:   Per Chart Review:       \"Derek Goldstein is a 61-year-old -American female who presented to [facility] on 6/30/2022 after complains of unresponsive episode at home. Bianka Reyna has no known past medical history per family. Jh Gruber state that she is a non-smoker and uses social alcohol. Jh Gruber also state that she has a \"holistic approach to [employer] report patient was last seen last evening around 5:30 PM when she was normal when she went out to eat with her family. Onita Favorite they returned she was noted to be vomiting and ended up falling on the floor last evening.  The fall was unwitnessed. Terri Cash sister did check up on her at that evening and patient was laying on the floor.  They check on her again this morning and she was still laying in the same spot.  She was drooling and her body was shaking.  At that time 911 was called. Bianka Reyna was given Narcan in route to the emergency department with no response.  On arrival code stroke was called. Bianka Reyna was taken for stat CT head which did reveal a large intraparenchymal hemorrhage extending into the ventricular system.  She went for stat CTA which was negative for aneurysm.  She was intubated in the emergency department and transferred to the ICU.  On interview with family they state that she has no medical history they are aware of. Jh Gruber deny any smoking or drug use. Jh Gruber states she is a social drinker of wine.  She has no children or no .  Brothers and sisters are at bedside making decisions.  Mother is at bedside but does have dementia.  Case was reviewed extensively with Dr. Scott Downey and  [place]  Dr. Scott Downey did speak with family and plan is for EVD placement this evening. \"     6/30: EVD placed  7/1: OR for hematoma evacuation  7/3: overnight patient had bradycardia that responded to atropine along with fixed pupils. STAT CTH showed progressive diffuse cerebral edema. There were concerns that EVD had clotted. NS aware and planned to place tPA in EVD.       ROS:     Limited due to intubation and sedation on mechanical ventilator. PMH:  Per HPI  SHX:    Per Chart Review:  Social History     Tobacco History     Smoking Status  Never Assessed    Smokeless Tobacco Use  Unknown          Alcohol History     Alcohol Use Status  Not Asked          Drug Use     Drug Use Status  Not Asked          Sexual Activity     Sexually Active  Not Asked                FHX:   Per Chart Review:  History reviewed. No pertinent family history. Allergies:   Per Chart Review:  No Known Allergies    Medications:     DOPamine 2.5 mcg/kg/min (07/03/22 0640)    [Held by provider] sodium chloride Stopped (07/02/22 2125)    sodium chloride        pantoprazole  40 mg IntraVENous BID    sodium chloride flush  5-40 mL IntraVENous 2 times per day    ceFAZolin  2,000 mg IntraVENous Q8H    sodium chloride  1,000 mL IntraVENous Once    levETIRAcetam  1,000 mg IntraVENous Q12H       Vital Signs:   Vitals:    07/03/22 0436 07/03/22 0500 07/03/22 0600 07/03/22 0700   BP:  135/79 130/74 129/82   Pulse: (!) 103 (!) 111 (!) 110 (!) 110   Resp: (!) 0 (!) 0 (!) 0 (!) 4   Temp:       TempSrc:       SpO2: 98% 95% 95% 95%   Weight:       Height:               T: 97.3   P: 113   RR: 16   B/P: 129/82  FiO2: 25  O2 Sat: 96    I/O: XYQNXU:4779 / Output:4363 =  XYWRO-4661     I/O last 3 completed shifts: In: 5508.3 [I.V.:4459.3; IV Piggyback:1049.1]  Out: 8884 [Urine:5060; Drains:74]  No intake/output data recorded. GCS: 3T    Body mass index is 28.47 kg/m². PCV  Rate set 12  Rate measured 16  PIP 25  PEEP 6, FiO2 25%    Physical Exam:     General:   Ill-appearing -American female  HEENT:  normocephalic. No scleral icterus. Pupils round, nonreactive. Surgical changes to anterior forehead with drain in place. Neck: supple. No Thyromegaly. Lungs: clear to auscultation. No retractions  Cardiac: RRR. No JVD. Abdomen: soft. Nontender. Extremities:  No clubbing, cyanosis, or edema x 4.     Vasculature: capillary refill < 3 seconds. Palpable dorsalis pedis pulses. Skin:  warm and dry. Psych: Intubated and sedated on mechanical ventilator. Lymph:  No supraclavicular adenopathy. Neurologic: No seizure-like activity. No tremors. No clonus. GCS 3T    Data: (All radiographs, tracings, PFTs, and imaging are personally viewed and interpreted unless otherwise noted).     Patient appears to be in sinus rhythm on telemetry   Sodium 156   Potassium 3.7 chloride 124 CO2 23   BUN 13 creatinine 1.2 anion gap 9   Calcium 8.4 magnesium 1.7   Albumin 2.5 total protein 5.1   WBC 14.8 hemoglobin 12.5 platelets 585    Electronically signed by Valentine Newsome MD, MPH              Electronically signed by Cam De Guzman MD on 7/3/2022 at 7:59 AM

## 2022-07-03 NOTE — PROCEDURES
EEG REPORT       Patient: [facility] Age: 60 y.o. MRN: [id number]      Referring Provider: No ref. provider found    History: This routine 30 minute scalp EEG was recorded with video- monitoring for a 60 y.o. Zully Samuels female who presented with encephalopathy. This EEG was performed to evaluate for focal and epileptiform abnormalities.      [facility]   Current Facility-Administered Medications   Medication Dose Route Frequency Provider Last Rate Last Admin    DOPamine (INTROPIN) 400 mg in dextrose 5 % 250 mL infusion  1-20 mcg/kg/min IntraVENous Continuous CHELLY Rebolledo - CNP   Stopped at 07/03/22 1049    magnesium sulfate 2000 mg in 50 mL IVPB premix  2,000 mg IntraVENous PRN Logan Marcelo APRN - CNP        lactated ringers infusion   IntraVENous Continuous [address] MD Craig 100 mL/hr at 07/03/22 1530 Rate Change at 07/03/22 1530    propofol injection 100 mg  100 mg IntraVENous Once [address] MD Craig        propofol injection  5-50 mcg/kg/min (Order-Specific) IntraVENous Continuous Ephraimf Conrad Jauregui MD 4.9 mL/hr at 07/03/22 1345 10 mcg/kg/min at 07/03/22 1345    [START ON 7/4/2022] levETIRAcetam (KEPPRA) 1,500 mg in sodium chloride 0.9 % 100 mL IVPB  1,500 mg IntraVENous Q12H Emma Monroy MD        pantoprazole (PROTONIX) injection 40 mg  40 mg IntraVENous BID [address] MD Craig   40 mg at 07/03/22 0928    [Held by provider] sodium chloride 3 % solution  20 mL/hr IntraVENous Continuous CHELLY Rebolledo - CNP   Stopped at 07/02/22 2125    potassium chloride 20 mEq/50 mL IVPB (Central Line)  20 mEq IntraVENous PRN Nazia Frames, PA-C   Stopped at 07/03/22 0242    Or    potassium chloride 10 mEq/100 mL IVPB (Peripheral Line)  10 mEq IntraVENous PRN Nazia Frames, PA-C        sodium chloride flush 0.9 % injection 5-40 mL  5-40 mL IntraVENous 2 times per day Nazia Frames, PA-C   10 mL at 07/03/22 0926    sodium chloride flush 0.9 % injection 5-40 mL  5-40 mL IntraVENous PRN Shayla Rainey Margaret Duong PA-C        0.9 % sodium chloride infusion   IntraVENous PRN Karole Merl, PA-C        morphine (PF) injection 2 mg  2 mg IntraVENous Q2H PRN Karole MerCOURTNEY aguileraC        Or    morphine injection 4 mg  4 mg IntraVENous Q2H PRN Karole Merl, PA-C        ceFAZolin (ANCEF) 2000 mg in dextrose 5 % 50 mL IVPB  2,000 mg IntraVENous Q8H Karole Homero, PA-C   Stopped at 07/03/22 1345    0.9 % sodium chloride bolus  1,000 mL IntraVENous Once Karole Merl, PA-C        LORazepam (ATIVAN) injection 2 mg  2 mg IntraVENous Q30 Min PRN Karole Merale, PA-C           Technical Description: This is a 21 channel digital EEG recording with time-locked video. Electrodes were placed in accordance with the 10-20 International System of Electrode Placement. Single lead EKG monitoring as well as temporal electrodes were included. The patient was not sleep deprived. This recording was obtained during intubated state. EEG Description: background activity showed low amplitude 1-2Hz delta slowingn. Intermittent emg artifact was seen . The EEG did not show any state change or reactivity. Activation procedures were not performed. The EKG channel demonstrated a normal sinus rhythm. Interpretation  This EEG was abnormal due to low amplitude delta slowing. Clinical correlation  This EEG was abnormal suggesting severe encephalopathy.  Careful clinical corelation is recommended    Nancy Bello MD    Diplomate, American Board of Psychiatry and Neurology  Diplomate, American Board of Clinical Neurophysiology  Diplomate, American Board of Epilepsy

## 2022-07-03 NOTE — SIGNIFICANT EVENT
Overnight patient developed sharply increasing urine output which appeared dilute. Serum sodium observed to be increasing despite no change in hypertonic saline rate of administration. Patient POD #2 right craniotomy and hematoma evacuation of right-sided intracerebral hemorrhage by Dr. Lakisha Rajan. I became suspicious for possible development of diabetes insipidus. Hypertonic saline was stopped. Serum Osm was obtained and was elevated at 306. Urine Osm confirmed dilute urine with result of 76 mosmol/kg. Rate of sodium increase remained within parameters, and was within recommended range of 145-150 by neurosurgical team. Continued sodium checks q2h with hypertonic saline paused. Patient was also assessed to have absence of pupillary response to light, but pupil sizes remained equal bilaterally. Overall, patient had more diminished withdrawal response of peripheral extremities. Concurrently, an episode of unstable junctional bradycardia (HR 30's) was observed with drop of SBP into 80's. Atropine 1 mg was given with resolution of bradycardia. BP stabilized. Will administer dopamine if persistent bradycardia occurs. ECG showed NSR with no acute ischemic changes. Stat labs were sent. Troponin was negative. Hypokalemia (K 2.8) and hypocalcemia (ICa 1.02) were corrected with IV supplementation. Given neurologic exam changes we obtained a STAT CT head wo contrast. Radiology report showed \"postoperative changes right calvarium and frontotemporal junction, similar to previous. Stable satisfactory position of right frontal ventriculostomy catheter. Large intraventricular hemorrhage, similar to previous. Progressive diffuse cerebral edema. \" Patient's EVD drainage unit was noted to have evidence of multiple blood clots suspected to be occluding appropriate drainage. I called Dr. Lakisha Rajan with these CT results and clinical exam findings, as well as EVD drain unit findings.  I was instructed to change the EVD drainage unit at this time, and that neurosurgery would plan to administer TPA via the EVD in the morning. Continue EVD at 10 per Dr. Camargo Sit. Continue to hold hypertonic saline given up-trending sodium from DI. Monitor rate of sodium increase and keep Na 145-155. No further recommendations at this time. I changed the EVD drainage unit under sterile conditions. Will order repeat CT head follow-up for later this morning. Case discussed with Dr. Maira Márquez and Dr. Raman Wisdom. Electronically signed by CHELLY Mendez CNP on 7/3/2022 at 4:13 AM         Addendum [id number] 7/3/22:    Patient with continued significant urine output. Serum Na has risen to 154, from 147 yesterday. Goal Na 145-155. Will administer 1 mcg of DDAVP now. Continue strict I/O and q2h sodiums.      Electronically signed by CHELLY Mendez CNP on 7/3/2022 at 8:17 AM

## 2022-07-03 NOTE — PROGRESS NOTES
Patient was seen and examine in the ICU. This is a 77-year-old female who was admitted on 6/30/2022 after she was found unresponsive per her family. Patient was intubated upon his arrival to the ER. The brain CT showed: Acute intra cerebral (right basal ganglion )and intraventricular hemorrhage. Patient underwent EVD placement on 6/30/2022. Patient underwent right sided craniotomy and evacuation of right sided intracerebral hemorrhage on 7/1/2022. In today visit:     Patient is still intubated and sedated. GCS: 3T.  Pupils equals (4 mm)  and not recative  No cough reflex. Has corneal reflex. Today brain CT showed:     1. Postoperative changes right calvarium and frontotemporal junction,    similar to previous. Stable satisfactory position of right frontal    ventriculostomy catheter. 2. Large intraventricular hemorrhage, similar to previous. 3. Progressive diffuse cerebral edema. Today labs have been reviewed. Patient recommendation treatment plan from neurosurgical perspective:     · Medical management per ICU team.  · Seizure precaution. · EEG  · Follow-up the recommendation of stroke/neurology team.  · Keep systolic blood pressure less than 160 and above 100. · Hypertonic 3 % hypertonic saline with target of sodium 145-150 (stop the the the hypertonic saline irrigation sodiums is back to the target range). · I believe at least some the brain CT findings are more consistent with hypertensive encephalopathy (cerebral autoperfusion perfusion failure)  · Now brain CT today.    · New brain CT tomorrow morning.     - EVD management:     ·  Keep the ICP less than  20 mm HG  · Keep systolic blood pressure more than 100  mm HG  · Keep Cerebral perfusion pressure (CPP)  more than 60 mm HG  · Keep blood glucose from  mg  · Open the EVD at the level of the 10 CM at this time   · tPA irrigation in EVD today.        -The case was discussed in detail with the ICU team.  -Neurosurgery will follow. *Time spent was at least 35 min of critical time evaluating patient, discussion with staff, planning for treatment and evaluation. The time was spent examining patient face to face, reviewing the images personally, reviewing the chart, perform high complexity decision making and speaking with the nursing staff regarding recommendations. There was a high probability of clinically significant life-threatening deterioration of the patient's condition requiring my urgent intervention.

## 2022-07-03 NOTE — PROGRESS NOTES
Patient Weaning Progress    The patient's vent settings was not able to be weaned this shift. Ventilator settings that were weaned              [] Mode   [] Pressure support weaned   [] Fio2 weaned   [] Peep weaned             Spontaneous weaning trial  was not attempted. Patient is hemodynamically unstable. Evac tube was  hooked up with continuous low suction(20-30mmHg)      Cuff was not  deflated to determine cuff leak.   *Specific details of weaning located in Ventilator documentation flowsheets*

## 2022-07-03 NOTE — PROGRESS NOTES
[facility] THERAPY MISSED TREATMENT NOTE  [facility] ICU 4D  4D-012-A      Date: 7/3/2022  Patient Name: Emilee Gonzalez        CSN: [id number]   : 1965  (60 y.o.)  Gender: female                REASON FOR MISSED TREATMENT: Hold Treatment per Nursing; patient continues to be orally intubated and not appropriate for early mobility this date. OT to hold and check back another day.

## 2022-07-04 NOTE — PROGRESS NOTES
Neurology Progress Note    Date:7/4/2022       Room:[facility]12/012-A  Patient Name:Dottie Ceballos     YOB: 1965     Age:56 y.o. Chief Complaint:   Chief Complaint   Patient presents with    Altered Mental Status       Subjective     Abida Benitez is a 60 y.o. female with no known history who presents as a stroke alert activation. Last evening around 530pm the patient was seen normal and went out to eat at that time. When she came back, she was noted to be vomiting and ended up falling to the floor last evening, but the fall was unwitnessed. However, her sister did end up checking on her that evening and the patient was laying on the floor. They checked on her again this morning and she was still laying on the floor. They noted that she was drooling and her body was shaking. At that time, they called 911. The patient was given narcan in route with no response. On arrival, she was taken for a head CT and was noted to have a large intraparenchymal hemorrhage with extension into her ventricular system. She underwent a CTA which was negative for aneurysm. She was subsequently intubated in the emergency department for airway protection. While in the ER, she was noted to have increased tone and myoclonic movements of her right upper extremity. There were also reports of seizure activity per EMS. The patient is currently in the ICU intubated on propofol. There has been no further seizure activity and the tone in the right upper extremity is normal. The patient is moving her right upper extremity spontaneously, but is not following commands. Interval history 7/2/22: The patient underwent EVD placement followed by craniotomy and hematoma evacuation. No seizures. Interval history 7/3/22: The patient developed evidence of DI overnight. She had a significant increase in her urine output and her serum sodium rukhsana abruptly. Her pupils became less reactive and a head CT revealed progressive cerebral edema.  She is no longer withdrawing in her extremities. She is currently off sedation and is on dopamine. Interval history 22: No seizures. No spontaneous movements. Review of Systems   Review of Systems   Unable to perform ROS: Intubated     Medications   Scheduled Meds:    propofol  100 mg IntraVENous Once    levETIRAcetam  1,500 mg IntraVENous Q12H    pantoprazole  40 mg IntraVENous BID    sodium chloride flush  5-40 mL IntraVENous 2 times per day    ceFAZolin  2,000 mg IntraVENous Q8H    sodium chloride  1,000 mL IntraVENous Once     Continuous Infusions:    norepinephrine 3 mcg/min (22 7341)    DOPamine Stopped (22 1049)    propofol Stopped (22 1413)    sodium chloride 75 mL/hr at 22 9354    sodium chloride       PRN Meds:   Medications Prior to Admission:   No current facility-administered medications on file prior to encounter. No current outpatient medications on file prior to encounter. Past History    Past Medical History:   has no past medical history on file. Social History:        Family History: History reviewed. No pertinent family history. Physical Examination      Vitals:  /64   Pulse 99 Comment: Simultaneous filing. User may not have seen previous data. Temp 97.4 °F (36.3 °C) (Bladder)   Resp 20 Comment: Simultaneous filing. User may not have seen previous data. Ht 5' 5\" (1.651 m)   Wt 186 lb 8.2 oz (84.6 kg)   SpO2 98% Comment: Simultaneous filing. User may not have seen previous data.   BMI 31.04 kg/m²   Temp (24hrs), Av.9 °F (37.2 °C), Min:97.4 °F (36.3 °C), Max:100.2 °F (37.9 °C)    Intracerebral Hemorrhage Score  Randall Coma Scale = 8 1   ICH Volume >= 30 mL. 34.3mL 1   Intraventricular Hemorrhage 1   Infratentorial Origin of Hemorrhage 0   Age => 80 0   TOTAL 3     30 day mortality  Score 0 = 0%  Score 1 = 13%  Score 2 = 26%  Score 3 = 72%  Score 4 = 97%  Score 5 = 100%  Score 6 = 100%    Modified Al Scale: 0      I/O contain minor errors which are inherent in voice recognition technology. ** Final report electronically signed by Dr. Nikolas Walter on 7/4/2022 7:11 AM    CT HEAD WO CONTRAST    Addendum Date: 7/3/2022    ** ADDENDUM #1 ** These findings were telephoned to Jaydon Ríos, the patient's nurse at 5:24pm on 7/3/2022. Final report electronically signed by Dr. Nikolas Walter on 7/3/2022 5:25 PM ** ORIGINAL REPORT ** PROCEDURE: CT HEAD WO CONTRAST CLINICAL INFORMATION: monitoring of worsening cerebral edema. Intracranial hemorrhage. Ventriculostomy tube in place. COMPARISON: Head CT 7/3/2022. Head CT 7/1/2022 and 6/30/2022. TECHNIQUE: Noncontrast 5 mm axial images were obtained through the brain. Sagittal and coronal reconstructions were obtained. All CT scans at this facility use dose modulation, iterative reconstruction, and/or weight-based dosing when appropriate to reduce radiation dose to as low as reasonably achievable. FINDINGS: The size of the frontal horn of the left lateral ventricle has decreased. This is best appreciated when assessing the coronal images. There is a right frontal ventriculostomy tube. This enters the body of the right lateral ventricle. Blood fills the occipital horns of the lateral ventricles. This is unchanged. There is also blood products in the body of the lateral ventricles bilaterally. There is pneumocephalus in the frontal horns of both lateral ventricles. There are blood products in the 4th ventricle. There is persistent diffuse cerebral edema. There is loss of all the cerebral sulci. The basal cisterns are completely effaced. There is tonsillar descent. There is inferior midbrain herniation. There is stable parenchymal hemorrhage in the anterior right basal ganglia with a tract extending to the surface of the right frontal lobe deep to the craniotomy site. Packing material within the surgical site is unchanged. There are no new areas of hemorrhage. There is no midline shift.   There is low attenuation which is diffuse the level the midbrain and wendy. This can represent developing ischemia. This could also represent beam hardening artifact from the skull. The patient is intubated and has an esophageal route tube in place. The paranasal sinuses and mastoid air cells are normally aerated. There is no suspicious calvarial abnormality. Result Date: 7/3/2022  PROCEDURE: CT HEAD WO CONTRAST CLINICAL INFORMATION: monitoring of worsening cerebral edema. Intracranial hemorrhage. Ventriculostomy tube in place. COMPARISON: Head CT 7/3/2022. Head CT 7/1/2022 and 6/30/2022. TECHNIQUE: Noncontrast 5 mm axial images were obtained through the brain. Sagittal and coronal reconstructions were obtained. All CT scans at this facility use dose modulation, iterative reconstruction, and/or weight-based dosing when appropriate to reduce radiation dose to as low as reasonably achievable. FINDINGS: The size of the frontal horn of the left lateral ventricle has decreased. This is best appreciated when assessing the coronal images. There is a right frontal ventriculostomy tube. This enters the body of the right lateral ventricle. Blood fills the occipital horns of the lateral ventricles. This is unchanged. There is also blood products in the body of the lateral ventricles bilaterally. There is pneumocephalus in the frontal horns of both lateral ventricles. There are blood products in the 4th ventricle. There is persistent diffuse cerebral edema. There is loss of all the cerebral sulci. The basal cisterns are completely effaced. There is tonsillar descent. There is inferior midbrain herniation. There is stable parenchymal hemorrhage in the anterior right basal ganglia with a tract extending to the surface of the right frontal lobe deep to the craniotomy site. Packing material within the surgical site is unchanged. There are no new areas of hemorrhage. There is no midline shift.   There is low attenuation which is diffuse the level the midbrain and wendy. This can represent developing ischemia. This could also represent beam hardening artifact from the skull. The patient is intubated and has an esophageal route tube in place. The paranasal sinuses and mastoid air cells are normally aerated. There is no suspicious calvarial abnormality. 1. Diffuse loss of the basal cisterns and cerebral sulci consistent with diffuse cerebral edema. There is associated midbrain and tonsillar herniation inferiorly. 2. Abnormal low attenuation in the midbrain and wendy can represent artifact versus ischemia. 3. Slitlike ventricles which have decreased in size. There is intraventricular hemorrhage with a ventriculostomy tube in place. **This report has been created using voice recognition software. It may contain minor errors which are inherent in voice recognition technology. ** Final report electronically signed by Dr. Samia Tillman on 7/3/2022 5:13 PM    CT HEAD WO CONTRAST    Result Date: 7/3/2022  ** ADDENDUM #1 ** This report was discussed with Vick Castillo on Jul 03, 2022 02:13:00 EDT. This document has been electronically signed by: Amanda De La Cruz on 07/03/2022 02:13 AM ** ORIGINAL REPORT ** CT head without IV contrast: Comparison: CT,SR - CT HEAD WO CONTRAST - 07/02/2022 04:45 AM EDT Findings: Right craniectomy change. Right ventriculostomy catheter terminates near the midline in the ipsilateral frontal horn. Right lateral convexity pneumocephalus Surgical partial evacuation of the frontal temporal junction. Large intraventricular hemorrhage. Diffuse cerebral edema which is progressive compared to previous, with decreased size of the ventricles. Unremarkable orbits. No significant sinus disease. Mastoid air cells are clear. No skull fracture. Impression: 1. Postoperative changes right calvarium and frontotemporal junction, similar to previous. Stable satisfactory position of right frontal ventriculostomy catheter.  2. Large intraventricular hemorrhage, similar to previous. 3. Progressive diffuse cerebral edema. This document has been electronically signed by: Ashley Alford MD on 07/03/2022 02:08 AM All CTs at this facility use dose modulation techniques and iterative reconstructions, and/or weight-based dosing when appropriate to reduce radiation to a low as reasonably achievable. Assessment and Plan:        1. Focal seizures in the setting of ICH  · CTA without evidence of aneurysm  · CT head this morning shows progressive cerebral edema with herniation. Areas of low attenuation in wendy and brainstem may be suggestive of infarcts. Full read above. Differential dx for cerebral edema includes hypertensive encephalopathy. · Continue Keppra 1.5g twice daily  · Stat EEG without epileptiform activity  · IV Ativan as needed for acute seizures. Order placed for Ativan 2 mg as needed. Call with any additional seizure activity. · No further workup from neurologic standpoint at this time. Neurology will sign off. Call with questions or concerns. This case was discussed with Dr. Brunilda Odell and he is in agreement with the assessment and plan.     Electronically signed by Temitope Sparks PA-C on 7/4/22 at 1:06 PM EDT

## 2022-07-04 NOTE — PROGRESS NOTES
Patient was seen and examine in the ICU. This is a 47-year-old female who was admitted on 6/30/2022 after she was found unresponsive per her family. Patient was intubated upon his arrival to the ER. The brain CT showed: Acute intra cerebral (right basal ganglion )and intraventricular hemorrhage. Patient underwent EVD placement on 6/30/2022. Patient underwent right sided craniotomy and evacuation of right sided intracerebral hemorrhage on 7/1/2022. In today visit:     Patient is still intubated and not sedated. GCS: 3T.  Pupils equals (4 mm)  and not recative  No cough reflex. Has corneal reflex. Today brain CT showed:     1. Persistent inferior midbrain and tonsillar herniation. Pineal calcifications are below the tentorium. 2. Stable parenchymal and intraventricular hemorrhage. 3. Blurring of the gray-white matter differentiation of the cerebral hemispheres. 4. Persistent low attenuation in the brainstem and wendy suggesting infarct. Today labs have been reviewed. Patient recommendation treatment plan from neurosurgical perspective:     · Medical management per ICU team.  · Seizure precaution. · EEG: Suggesting severe encephalopathy. · Follow-up the recommendation of stroke/neurology team.  · Keep systolic blood pressure less than 160 and above 100. · Hypertonic 3 % hypertonic saline with target of sodium 145-150 (stop the the the hypertonic saline irrigation sodiums is back to the target range). · Mannitol 50 g every 8 hours. · Systolic blood pressure between 160-140. · I believe at least some the brain CT findings are more consistent with hypertensive encephalopathy (global cerebral autoperfusion /autoregulation failure)  · New brain CT tomorrow morning. 1. Persistent inferior midbrain and tonsillar herniation. Pineal calcifications are below the tentorium. 2. Stable parenchymal and intraventricular hemorrhage.    3. Blurring of the gray-white matter differentiation of the cerebral hemispheres. 4. Persistent low attenuation in the brainstem and wendy suggesting infarct. · Based on patient's current medical status and the findings of her brain CT, I believe the prognosis is poor. · Today I had long discussion with patient family brother and sisters regarding patient current current neurological status and the findings of her most recent CTs. all questions and concerns were addressed and answered. · Also discussed the case in detail with the ICU team and with patient's nurse. - EVD management:     ·  Keep the ICP less than  20 mm HG  · Keep systolic blood pressure more than 100  mm HG  · Keep Cerebral perfusion pressure (CPP)  more than 60 mm HG  · Keep blood glucose from  mg  · Open the EVD at the level of the 10 CM at this time     -The case was discussed in detail with the ICU team.  -Neurosurgery will follow. *Time spent was at least 35 min of critical time evaluating patient, discussion with staff, planning for treatment and evaluation. The time was spent examining patient face to face, reviewing the images personally, reviewing the chart, perform high complexity decision making and speaking with the nursing staff regarding recommendations. There was a high probability of clinically significant life-threatening deterioration of the patient's condition requiring my urgent intervention.

## 2022-07-04 NOTE — SIGNIFICANT EVENT
Rebel Covarrubias is a 70-year-old -American female admitted 6/30/2022 with intraparenchymal hemorrhage extending into the ventricular system. Patient did go emergently to the OR for EVD placement, and 7/1/2022 right-sided craniotomy and evacuation of right sided intracerebral hemorrhage. 7/3/2022 CT head persistent diffuse cerebral edema. There is inferior midbrain and tonsillar herniation. 7/4/2022 CT head persistent inferior midbrain and tonsillar herniation. Patient was given mannitol earlier in the day. Neurosurgery-Dr. Jose Guadalupe Flores reviewed CT head felt were consistent with hypertensive encephalopathy-global cerebral autoperfusion/autoregulation failure. 7/4/2022 neurology did sign off case. I did reach out to family Dominique Mayfield, who has been spokesperson for family and signing consent for procedures for Aundra [place]. Abby Schaffer identified that he was told prognosis is very poor from neurosurgery standpoint. They plan to meet as a family on 7/5/2022 and if no change or improvement seen will plan to withdraw care. CODE STATUS was changed to a limited x4. No to reintubation, no to defibrillation, no to CPR, no to emergency medications. \"She has fought so hard and when it is her time it will be time to focus on comfort \".    1755 sodium level was 166, mannitol had been given at 920. I did review half-life pharmacokinetics with pharmacy based on GFR effects will be present for at least 30 hours. Although hypotonic fluids such as 0.45 normal saline are usually avoided, considering significant hypernatremia will be continued. Will monitor every 4 hour BMP to avoid rapid fluid shifts across the blood brain barrier than can result in cerebral edema.

## 2022-07-04 NOTE — PROGRESS NOTES
CRITICAL CARE NOTE        Patient:  Jorge Luis Montes    Unit/Bed:4D-12/012-A  MRN: [id number]   PCP: No primary care provider on file. Date of Admission: 6/30/2022    Assessment and Plan(All pulmonary edema, renal failure, PE, and respiratory failure diagnoses are acute in nature unless otherwise specified):            1. Acute hypoxic respiratory failure: S/p intubation on 6/30 secondary to inability to maintain airway. Maintain peak pressure of 35 or less and plateau pressure of 30 or less.  Mental status precludes extubation. 2. Intraparenchymal hemorrhage: With extension into the ventricular system. S/p TXA.  S/p EVD placement 6/30. ICP goal <20; SBP Goal <160>100; CPP goal >60.  S/p debulking/hematoma evacuation 7/1/22 with NS. Course complicated by bradycardia and fixed pupils overnight 7/2; [facility] showed stable hemorrhage but progressive worsened cerebral edema. EVD had multiple clots without drainage s/p tPA in EVD. Repeat CTH 7/4 with stable hemorrhage. Will touch base with Neurosurg is can discontinue Ancef. 3. Diabetes Insipidus: 4L urine output overnight. DDAVP given. Trend Na q2h.   4. Seizures: due to [address]. Noted to have a focal seizure in the ED and reportedly at home noted by family member. S/p Keppra loading dose 4.5 g. On 1000 mg Keppra twice daily per neurology.  EEG 7/3 abnormal suggesting severe encephalopathy. 5. TOSIN: Cr 1.2 on admission, unclear baseline. Cr 1.4 this morning. On 0.45 NS at 75 cc/hr. Also received a 0.45 NS bolus. Monitor & trend. Avoid nephrotoxins. Will hold off on further work up at this time given poor neurologic prognosis. 6. Hypertensive Emergency: Systolic greater than 048 on arrival to the ED.  Nicardipine drip.  Systolic blood pressure goal less than 160 but greater than 100. Resolved. 7. Hypokalemia: PRN potassium replacement. 8. Leukocytosis: Likely reactive.  Remains afebrile; will trend and monitor for signs of infection. Resolving.            CC: AMS  HPI: 61 yo [facility] F with known PMH, presented on 6/30/2022 after being found unresponsive episode at home. Reportedly was at her baseline at 1730 hr, went out with family. Noted to have vomiting and fell on the floor- unwitnessed. Patient was noted to be on the laying on the floor that evening by her sister. Unclear if anything was done. Next morning, she was still laying on the floor. She was drooling and her body was shaking. She was given Narcan in route to the emergency department with no response. CT head showed a large right sided intraparenchymal hemorrhage extending into the ventricular system.  . CTA head was negative for aneurysm. S/p intubation, admitted for ICU. S/p EVD placement on 6/30 and hematoma evacuation on 7/1. 7/3: overnight patient had bradycardia that responded to atropine along with fixed pupils. STAT CTH showed progressive diffuse cerebral edema. There were concerns that EVD had clotted. S/p tPA administration. CT head 7/3 with diffuse loss of the basal cisterns and cerebral sulci consistent with diffuse cerebral edema with associated midbrain and tonsillar herniation inferiorly. Also noted to have abnormal low attenuation in the midbrain and wendy, slitlike ventricles- decreased in size. Intraventricular hemorrhage with ventriculostomy tube in place. Subjective (events over the past 24 hours):   No acute events overnight. Per nursing, noted change in her neuro exam is reported- no longer withdrawing to painful stimuli. no gag/pupillary/corneal reflexes. CT head this morning 7/4 with persistent inferior midbrain and tonsillar herniation, stable parenchymal and intraventricular hemorrhage. Blurring of the gray-white matter differentiation of the cerebral hemispheres and persistent low attenuation in the brainstem and wendy suggesting infarct.       ROS: unable to obtain    Objective:  Vitals:  Temp: 100.2 °F (37.9 °C)  Heart Rate: 83  Resp: 20  BP: 103/65  FiO2 : 35 % (increased by [place])  SpO2: 99 %  Randall Coma Scale  Eye Opening: None  Best Verbal Response: None  Best Motor Response: None  Randall Coma Scale Score: 3  OPO Notified: Yes  Date OPO Notified: 07/03/22  Time OPO Notified: 5934  OPO Referral Number: [id number]    MAP: 84    Ventilator:  Vent Information  Vent Mode: PCV+  Resp Rate (Set): 20 bmp (increased by [place])  FiO2 : 35 % (increased by [place])  PEEP/CPAP (cmH2O): 6  Pressure Ordered: 26 (PC 20)  End Tidal CO2: 28 (%)     Vent Patient Data  Peak Inspiratory Pressure: 29 cmH2O  Mean Airway Pressure: 14 cmH20  Rate Measured: 20 br/min  Vt Exhaled: 348 mL  Minute Volume: 7 Liters  I:E Ratio: 1:2.0      Input/Output: In: 3384.9   Out: 5805 [XEJRO:7257; Drains:38]    Intake/Output Summary (Last 24 hours) at 7/4/2022 0622  Last data filed at 7/4/2022 0330  Gross per 24 hour   Intake 2680.24 ml   Output 2257 ml   Net 423.24 ml   UOP: 0.9 cc/kg/hr  Net UOP since admission: + 2.73 L  Last Bowel Movement:     Sigala: Yes, Day # 5 placed on 6/30  Drains: Yes, right scalp bulb drain with right ventriculostomy, Day # 4  Drips: Yes, Levo gtt @ 4  Central Lines/Ports: Yes, Location right IJ placed 6/30, Day # 5  Art Line:Yes, Location left radial placed 7/1, Day # 4  PICC Line: No  ETT: Yes, Day # 5 intubated 6/30    Medications:     norepinephrine 3 mcg/min (07/04/22 0209)    DOPamine Stopped (07/03/22 1049)    propofol Stopped (07/03/22 1413)    sodium chloride 75 mL/hr at 07/04/22 0151    sodium chloride        propofol  100 mg IntraVENous Once    levETIRAcetam  1,500 mg IntraVENous Q12H    pantoprazole  40 mg IntraVENous BID    sodium chloride flush  5-40 mL IntraVENous 2 times per day    ceFAZolin  2,000 mg IntraVENous Q8H    sodium chloride  1,000 mL IntraVENous Once       BMI:  Body mass index is 28.47 kg/m². Physical Exam:  General: intubated, sedated   HEENT:  normocephalic and atraumatic. No scleral icterus. No PERRL  Neck: supple. Lungs: clear to auscultation. No retractions  Cardiac: RRR. No JVD. Abdomen: soft. Nontender. Nondistended   Extremities:  No clubbing, cyanosis, or edema x 4. Vasculature: capillary refill < 3 seconds. Palpable dorsalis pedis pulses. Skin:  warm and dry. Psych: Intubated, sedated  Lymph:  No supraclavicular adenopathy. Neurologic:  No reflexes. No noted seizures. Unable to assess CN function. Data: (All radiographs, tracings, PFTs, and imaging are personally viewed and interpreted unless otherwise noted).  Na 158, k 3.3, Cl 127, CO2 22, BUN 16,  Cr 1.4, eGFR 47, Gluc 106   WBC 11.7, Hb 12.3,  Hct 39.3,    ABG 7.48/30/91/22   CT head 7/4 with with persistent inferior midbrain and tonsillar herniation, stable parenchymal and intraventricular hemorrhage. Blurring of the gray-white matter differentiation of the cerebral hemispheres and persistent low attenuation in the brainstem and wendy suggesting infarct. FEN/GI/DVT  IVF: 0.45 NS @ 75cc/hr  Electrolytes: Monitor and replace per protocols  GI PPX: Yes protonix  DVT Prophylaxis: SCDs  Diet:  Diet NPO  ADULT TUBE FEEDING; Orogastric; Immune Enhancing; Continuous; 15; Yes; 10; Q 8 hours; 45; 30; Q 3 hours    Meets Continued ICU Level Care Criteria:    [x] Yes   [] No - Transfer Planned to listed location:    Case and plan discussed with Dr. Svetlana Keller.     Phil Gleason MD  PGY2, Internal Medicine

## 2022-07-04 NOTE — PROGRESS NOTES
Patient Weaning Progress    The patient's vent settings was able to be weaned this shift. Ventilator settings that were weaned              [] Mode   [] Pressure support weaned   [x] Fio2 weaned   [] Peep weaned             Spontaneous weaning trial  was not attempted.      Evac tube was  hooked up with continuous low suction(20-30mmHg)      *Specific details of weaning located in Ventilator documentation flowsheets*

## 2022-07-04 NOTE — PLAN OF CARE
Problem: Discharge Planning  Goal: Discharge to home or other facility with appropriate resources  Outcome: Not Progressing  Flowsheets (Taken 7/4/2022 1233)  Discharge to home or other facility with appropriate resources:   Identify barriers to discharge with patient and caregiver   Arrange for needed discharge resources and transportation as appropriate   Identify discharge learning needs (meds, wound care, etc)  Note: Pt not progressing well. Noted brain herniation on CT scan     Problem: Nutrition Deficit:  Goal: Optimize nutritional status  Outcome: Not Progressing  Flowsheets (Taken 7/4/2022 1233)  Nutrient intake appropriate for improving, restoring, or maintaining nutritional needs:   Assess nutritional status and recommend course of action   Monitor oral intake, labs, and treatment plans   Recommend, monitor, and adjust tube feedings and TPN/PPN based on assessed needs  Note: Pt not tolerating TF. High residuals noted. TF on hold      Problem: Respiratory - Adult  Goal: Achieves optimal ventilation and oxygenation  Outcome: Progressing  Flowsheets (Taken 7/4/2022 1233)  Achieves optimal ventilation and oxygenation:   Oxygen supplementation based on oxygen saturation or arterial blood gases   Assess for changes in respiratory status   Position to facilitate oxygenation and minimize respiratory effort  Note: Remains on mechanical ventilation. Not breathing above vent. Minimal vent settings. Problem: Pain  Goal: Verbalizes/displays adequate comfort level or baseline comfort level  Outcome: Progressing  Flowsheets (Taken 7/4/2022 1233)  Verbalizes/displays adequate comfort level or baseline comfort level: Assess pain using appropriate pain scale  Note: No withdraw to painful stimuli. No physiological s/s of pain.       Problem: Safety - Adult  Goal: Free from fall injury  Outcome: Progressing  Flowsheets  Taken 7/4/2022 1233  Free From Fall Injury:   Based on caregiver fall risk screen, instruct family/caregiver to ask for assistance with transferring infant if caregiver noted to have fall risk factors   Instruct family/caregiver on patient safety  Taken 7/4/2022 0945  Free From Fall Injury:   Instruct family/caregiver on patient safety   Based on caregiver fall risk screen, instruct family/caregiver to ask for assistance with transferring infant if caregiver noted to have fall risk factors  Note: No falls this shift. Precautions remain in place. Problem: ABCDS Injury Assessment  Goal: Absence of physical injury  Outcome: Progressing  Flowsheets  Taken 7/4/2022 1233  Absence of Physical Injury: Implement safety measures based on patient assessment  Taken 7/4/2022 0945  Absence of Physical Injury: Implement safety measures based on patient assessment  Note: Pt noted to have herniation on CT scan. No other injury noted     Problem: Skin/Tissue Integrity  Goal: Absence of new skin breakdown  Description: 1. Monitor for areas of redness and/or skin breakdown  2. Assess vascular access sites hourly  3. Every 4-6 hours minimum:  Change oxygen saturation probe site  4. Every 4-6 hours:  If on nasal continuous positive airway pressure, respiratory therapy assess nares and determine need for appliance change or resting period. Outcome: Progressing  Note: No new areas of breakdown   Care plan reviewed with brother Jose L William. Patient unable to participate in care planning. Brother Jose L William verbalize understanding of the plan of care and contribute to goal setting.

## 2022-07-05 NOTE — PLAN OF CARE
Problem: Respiratory - Adult  Goal: Achieves optimal ventilation and oxygenation  7/5/2022 0303 by Keerthi Pitt RCP  Outcome: Progressing     Problem: Respiratory - Adult  Goal: Able to breathe comfortably  Description: Able to breathe comfortably  Outcome: Progressing   Not able to do SBT at this time. Was able to wean FiO2 to 30%.

## 2022-07-05 NOTE — DEATH NOTES
Neurologic Assessment for Milderd Emanuel Death:    Verbal Stimulus:  absent  Noxious Stimulus:  absent  Cough:  absent  Gag:  absent  Pupillary Response:  absent  Corneal Reflex:  absent  Oculocephalic Reflex: absent  Bilateral Cold Caloric Reflex: absent  Apnea Test: No respirations at 5 mins 20 secs. Pre-Apnea AB.35/42/384/23  Post-Apnea AB.22/56/57/23  CT Head : slight progression in diffuse cerebral edema with loss of gray/white differentiation and diffuse sulcal effacement. NM Brain Flow: no cerebral perfusion  EEG:  Not done    Time of Death 1337 hrs on 2022. Entire process was supervised by Dr. Zohaib Cope.        Electronically signed by Aundrea Daniels MD.

## 2022-07-05 NOTE — PLAN OF CARE
Problem: Discharge Planning  Goal: Discharge to home or other facility with appropriate resources  7/4/2022 2047 by Ingrid Perez RN  Outcome: Progressing  Flowsheets (Taken 7/4/2022 2046)  Discharge to home or other facility with appropriate resources:   Identify barriers to discharge with patient and caregiver   Arrange for needed discharge resources and transportation as appropriate   Identify discharge learning needs (meds, wound care, etc)     Problem: Respiratory - Adult  Goal: Achieves optimal ventilation and oxygenation  7/4/2022 2047 by Ingrid Perez RN  Outcome: Progressing  Flowsheets (Taken 7/4/2022 2046)  Achieves optimal ventilation and oxygenation:   Assess for changes in respiratory status   Assess for changes in mentation and behavior   Position to facilitate oxygenation and minimize respiratory effort   Oxygen supplementation based on oxygen saturation or arterial blood gases   Respiratory therapy support as indicated   Assess the need for suctioning and aspirate as needed     Problem: Pain  Goal: Verbalizes/displays adequate comfort level or baseline comfort level  7/4/2022 2047 by Ingrid Perez RN  Outcome: Progressing  Flowsheets (Taken 7/4/2022 2047)  Verbalizes/displays adequate comfort level or baseline comfort level:   Encourage patient to monitor pain and request assistance   Assess pain using appropriate pain scale   Administer analgesics based on type and severity of pain and evaluate response   Implement non-pharmacological measures as appropriate and evaluate response     Problem: Safety - Adult  Goal: Free from fall injury  7/4/2022 2047 by Ingrid Perez RN  Outcome: Progressing  Flowsheets (Taken 7/4/2022 2047)  Free From Fall Injury: Instruct family/caregiver on patient safety     Problem: ABCDS Injury Assessment  Goal: Absence of physical injury  7/4/2022 2047 by Ingrid Perez RN  Outcome: Progressing  Flowsheets (Taken 7/4/2022 2047)  Absence of Physical Injury: Implement safety measures based on patient assessment     Problem: Skin/Tissue Integrity  Goal: Absence of new skin breakdown  Description: 1. Monitor for areas of redness and/or skin breakdown  2. Assess vascular access sites hourly  3. Every 4-6 hours minimum:  Change oxygen saturation probe site  4. Every 4-6 hours:  If on nasal continuous positive airway pressure, respiratory therapy assess nares and determine need for appliance change or resting period. 7/4/2022 2047 by Venkat Zapata RN  Outcome: Progressing     Problem: Nutrition Deficit:  Goal: Optimize nutritional status  7/4/2022 2047 by Venkat Zapata RN  Outcome: Progressing  Flowsheets (Taken 7/4/2022 2047)  Nutrient intake appropriate for improving, restoring, or maintaining nutritional needs:   Assess nutritional status and recommend course of action   Recommend appropriate diets, oral nutritional supplements, and vitamin/mineral supplements   Order, calculate, and assess calorie counts as needed   Monitor oral intake, labs, and treatment plans     Care plan unable to be reviewed with patient due to patient condition. No family present at bedside.

## 2022-07-05 NOTE — PROGRESS NOTES
Addendum by Dr. Aidan Cox MD:  I have seen and examined the patient independently. Face to face evaluation and examination were performed. The below evaluation and note have been reviewed. Labs and radiographs were reviewed. I Have discussed with Neurosurgery PA/ NP about this patient in detail. Time spent with patient 35  minutes. Time could have been discontiguous. Time does not include procedures. Time does include my direct assessment of the patient and coordination of care. 100% decision making by myself. Time represents more than 50% of the time involved with patient care by the neurosurgical team  I agree with below assessment and plan. Please see my modifications mentioned below. My additional comments and modifications:  · Patient is intubated and not sedated. · No corneal reflex. · Pupils: 4 mm and not reactive. · GCS 3 T.  · No cough reflex. · Last brain Brain CT today showed:    Slight progression in diffuse cerebral edema with loss of gray / white    differentiation and diffuse sulcal effacement. Stable hemorrhage and axial herniation. Postoperative changes.     - NM BRAIN SCAN VASCULAR FLOW ONLY:    Absent cerebral perfusion.    -Most likely advanced stage of hypertensive encephalopathy (given patient medical history uncontrolled presentation). -Current medical status with the patient is more consistent with brain death.  -Once, the  brain death is confirmed, neurosurgery team will sign off on this patient. Please call me if you have any further questions or concern regarding this patient.  -I discussed the case in detail today with ICU team (Dr. Parker Hughes) and [place] (NP) and with patient's family.      Aidan Cox MD     Neurosurgery Progress Note    Patient:  Lauren Jenkins      Unit/Bed:4D-12/012-A    YOB: 1965    MRN: [id number]     Acct: [de-identified]     Admit date: 6/30/2022    Chief Complaint   Patient presents with    Altered Mental Status       Patient Seen, Chart, Physician notes, Labs, Radiology studies reviewed. Subjective:   Patient intubated     Past, Family, Social History unchanged from admission. Diet:  Diet NPO  ADULT TUBE FEEDING; Orogastric; Immune Enhancing; Continuous; 15; Yes; 10; Q 8 hours; 45; 30; Q 3 hours    Medications:  Scheduled Meds:   desmopressin  1 mcg SubCUTAneous BID    levETIRAcetam  1,500 mg IntraVENous Q12H    pantoprazole  40 mg IntraVENous BID    sodium chloride flush  5-40 mL IntraVENous 2 times per day     Continuous Infusions:   dextrose 200 mL/hr at 07/05/22 0626    norepinephrine 4 mcg/min (07/05/22 0634)    sodium chloride       PRN Meds:magnesium sulfate, potassium chloride **OR** potassium chloride, sodium chloride flush, sodium chloride, morphine **OR** morphine, LORazepam    Objective:    Vitals: /73   Pulse 84   Temp 99.3 °F (37.4 °C) (Bladder)   Resp 20   Ht 5' 5\" (1.651 m)   Wt 181 lb 10.5 oz (82.4 kg)   SpO2 98%   BMI 30.23 kg/m²     Physical Exam  Constitutional:       Comments: intubated   Eyes:      Comments: No PERRLA   Cardiovascular:      Rate and Rhythm: Normal rate. Pulses: Normal pulses. Pulmonary:      Effort: No respiratory distress. Abdominal:      General: There is no distension. Tenderness: There is no abdominal tenderness. Skin:     General: Skin is warm. Neurological:      Comments: Not withdrawing to painful stimuli. no gag/pupillary/corneal reflexes            Review of Systems     Unable to perform a comprehensive review of symptoms due to patient's status. 24 hour intake/output:    Intake/Output Summary (Last 24 hours) at 7/5/2022 0659  Last data filed at 7/5/2022 0634  Gross per 24 hour   Intake 4403.7 ml   Output 7276 ml   Net -2872.3 ml     Last 3 weights:   Wt Readings from Last 3 Encounters:   07/05/22 181 lb 10.5 oz (82.4 kg)         CBC:   Recent Labs     07/02/22  0750 07/03/22  0050 07/04/22  0555   WBC 17.0* 14.8* 11.7*   HGB 13.8 12.5 12.3    All carotid artery measurements are performed utilizing Nascet criteria. This exam was analyzed using [employer].  contact CT LVO. All CT scans at this facility use dose modulation, iterative reconstruction, and/or weight-based dosing when appropriate to reduce radiation dose to as low as reasonably achievable. FINDINGS: CTA NECK:  Aortic arch and branches: Normal origin of the brachiocephalic, left common carotid and left subclavian arteries from the aortic arch Right common carotid artery/ICA: Punctate calcification at the origin of the right internal carotid artery. No significant hemodynamic stenosis involving the right common and internal carotid arteries. Left common carotid artery/ICA: No significant hemodynamic stenosis involving the left common and internal carotid arteries. Vertebral arteries: Antegrade flow in the right and left vertebral arteries CTA HEAD: Internal carotid arteries: Antegrade flow in the right and left internal carotid arteries. Middle cerebral arteries: Antegrade flow in the middle cerebral arteries bilaterally. Anterior cerebral arteries: Antegrade flow in the anterior cerebral arteries bilaterally. . Vertebral arteries: Antegrade flow in the right and left vertebral arteries Basilar artery: Antegrade flow in the basilar artery. Superior cerebellar arteries: Antegrade flow in the right and left superior cerebellar hemispheres Posterior cerebral arteries: Antegrade flow in the posterior cerebral arteries bilaterally. Patent posterior communicating artery on the right side. No aneurysms, stenoses or occlusions are noted. The superior sagittal sinus, vein of Duncan, internal cerebral veins, straight sinus, transverse sinuses and sigmoid sinuses are patent. Axial source data: Extensive hemorrhage within the right basal ganglia, right and left lateral ventricles, third and fourth ventricles seen best on the noncontrast CT scan. Areas of abnormal density in the right and left lung fields.  Slight heterogeneity involving the right and left lobes of the thyroid gland. 1. Essentially negative CTA of the head and neck. 2. Extensive hemorrhage within the right basal ganglia, lateral, third and fourth ventricles seen best on the noncontrast CT scan of the brain. 3. Areas of abnormal density in the right and left lung fields. 4. Slight heterogeneity involving the right and left lobes of the thyroid gland. **This report has been created using voice recognition software. It may contain minor errors which are inherent in voice recognition technology. ** Final report electronically signed by DR Anna Miller on 6/30/2022 12:40 PM    CT HEAD WO CONTRAST    Result Date: 7/1/2022  PROCEDURE: CT HEAD WO CONTRAST CLINICAL INFORMATION: Post craniotomy and evacuation of intracranial hemorrhage., To be imaged on the way to ICU. COMPARISON: CT scan of the brain dated 1 July 2022. TECHNIQUE: Noncontrast 5 mm axial images were obtained through the brain. Sagittal and coronal reconstructions were obtained. All CT scans at this facility use dose modulation, iterative reconstruction, and/or weight-based dosing when appropriate to reduce radiation dose to as low as reasonably achievable. FINDINGS: The patient has undergone right temporal craniotomy and evacuation of the hemorrhage in the right temporal lobe. There is a ventricular shunt entering the calvarium in the right frontal region. Tip of the shunt is just anterior to the third ventricle. There is extensive hemorrhage within the frontal horn of the right lateral ventricle, dependent portions of the right and left lateral ventricles, in the third and fourth ventricles. There is moderate dilatation of the lateral ventricles especially the temporal horns. There is extensive diminished attenuation in the white matter suggestive off edema There are inflammatory changes in the left sphenoid sinus and to lesser extent ethmoid air cells bilaterally.   The remaining paranasal sinuses and mastoid air cells are normally aerated. There is proptosis on the left side. There is increased soft tissue density in the nasopharynx consistent with enlarged adenoids. 1. Status post right temporal craniotomy and evacuation of hemorrhage in the right temporal lobe. 2. Intraventricular shunt entering the calvarium in the right frontal region with the tip of the shunt just anterior to the third ventricle. 3. Extensive hemorrhage in the frontal horn of the right lateral ventricle, dependent portions of the right and left lateral ventricles, third and fourth ventricles. Moderate dilatation of the lateral ventricles especially the temporal horns. 4. Diminished attenuation in the white matter suggestive of edema. 5.Proptosis on the left side. 6. Inflammatory changes in the left sphenoid sinus and to lesser extent in the ethmoid air cells bilaterally. 7. Enlarged adenoids. **This report has been created using voice recognition software. It may contain minor errors which are inherent in voice recognition technology. ** Final report electronically signed by DR Lia Ngo on 7/1/2022 3:38 PM    CT HEAD WO CONTRAST    Result Date: 7/1/2022  CT head without contrast. Comparison: CT,SR - CT HEAD WO CONTRAST - 06/30/2022 05:11 PM EDT . Findings: Right anterior basal ganglia acute parenchymal hemorrhage 4.0 x 3.1 x 2.4 cm. Previously 4.8 x 3.1 x 2.1 cm. Acute blood throughout the ventricles, and a right frontal ventriculostomy are again noted. Leftward midline shift of septum pellucidum 7 mm, previously 6 mm. Small amount of pneumocephalus within the ventricular system again noted. ET and OG tubes in place. No acute fractures. Orbits, sinuses, and mastoids unremarkable. Impression: 1. Slightly decreased hemorrhage within the right basal ganglia. Additional findings are relatively similar to the prior exam. This document has been electronically signed by:  Jarod Cornejo MD on 07/01/2022 04:53 AM All CTs at this facility use dose modulation techniques and iterative reconstructions, and/or weight-based dosing when appropriate to reduce radiation to a low as reasonably achievable. CT HEAD WO CONTRAST    Result Date: 6/30/2022  CT head without contrast Comparison: CT,[facility] - CT HEAD WO CONTRAST - 06/30/2022 11:34 AM EDT Findings: 3.4 x 4.7 cm acute intraparenchymal hematoma centered within the right basal ganglia without change. This is associated with approximately 7 mm of midline shift to the left without significant change. There is a large intraventricular hematoma extending throughout the ventricular system, without significant change. There is a new ventriculostomy tube with its tip overlying the foramina of [place]. Ventriculomegaly is without significant change. There is mild pneumocephalus at the level of the right lateral ventricle, new since the prior study. There is extensive severe cerebral edema, similar to the prior study. Intravenous contrast is present from a recent CTA. Mild mucosal thickening within the left sphenoid sinus. Clear bilateral mastoid air cells. The orbits are within normal limits. There is no acute fracture. 1. Large right-sided intraparenchymal hematoma and large hematoma throughout the ventricular system with mild leftward midline shift and mild ventriculomegaly, similar to the prior study. 2. There is a new ventriculostomy tube overlying the foramina of [place]. There is a small amount of gas within the right lateral ventricle. 3. There is severe cerebral edema, similar to the prior study. This document has been electronically signed by: Clement Lubin MD on 06/30/2022 06:01 PM All CTs at this facility use dose modulation techniques and iterative reconstructions, and/or weight-based dosing when appropriate to reduce radiation to a low as reasonably achievable.     CT Head WO Contrast    Result Date: 6/30/2022  PROCEDURE: CT HEAD WO CONTRAST CLINICAL INFORMATION:altered mental status COMPARISON: None TECHNIQUE: 5 mm axial imaging through the head without IV contrast. All CT scans at this facility use dose modulation, iterative reconstruction, and/or weight based dosing when appropriate to reduce the radiation dose to as low as reasonably achievable. FINDINGS: A 4.8 x 3.4 cm area of intraparenchymal hemorrhage is seen in the right basal ganglion. There is 8 mm midline shift to the left at the level of the septum pellucidum. Intraventricular lobe is also seen. Mild hydrocephalus is noted. There is sulci effacement. The posterior fossa is unremarkable. The craniocervical junction is unremarkable. No acute bony abnormality. The  paranasal sinuses are clear. The  mastoid air cells are clear. The orbits are unremarkable. Marked atrophy of the pituitary gland with empty sella appearance. 1. A 4.8 x 3.4 cm intraparenchymal hemorrhage in the right basal ganglion is noted. 8 mm midline shift to the left at the level of the septum pellucidum. Mild hydrocephalus is seen. Intraventricular blood is also noted. The critical  finding(s) was called to Dr. Verona Carlton at 1200 hours on 6/30/2022 by Dr. Dany Valdovinos. Verbal acknowledgment and readback was given. **This report has been created using voice recognition software. It may contain minor errors which are inherent in voice recognition technology. ** Final report electronically signed by Dr Allyson Miller on 6/30/2022 12:07 PM    CTA NECK W WO CONTRAST    Result Date: 6/30/2022  WET READ: No hemodynamically significant stenosis is seen in the intracranial and extracranial arteries. See the CT of the head for further details about the findings in the head. Bilateral upper lobe infiltrates, right greater than left. This examination will be formally read by one of the neuro radiologists later today. Please refer to that report.   Lori Newsome ON 6/30/2022 12:12 PM. **This report has been created using voice recognition software. It may contain minor errors which are inherent in voice recognition technology. ** PROCEDURE: CTA HEAD W WO CONTRAST, CTA NECK W WO CONTRAST CLINICAL INFORMATION: bleed. COMPARISON: CT scan of the brain obtained on the same day. . TECHNIQUE: 1 mm axial images were obtained through the head and neck after the fast bolus administration of contrast. A noncontrast localizer was obtained. 3-D reconstructions were performed on a dedicated 3-D workstation. These include multiplanar MPR images and multiplanar MIP images. Centerline reconstructions were obtained of the carotid systems. Isovue intravenous contrast was given. All carotid artery measurements are performed utilizing Nascet criteria. This exam was analyzed using [employer]. mobiDEOS CT LVO. All CT scans at this facility use dose modulation, iterative reconstruction, and/or weight-based dosing when appropriate to reduce radiation dose to as low as reasonably achievable. FINDINGS: CTA NECK:  Aortic arch and branches: Normal origin of the brachiocephalic, left common carotid and left subclavian arteries from the aortic arch Right common carotid artery/ICA: Punctate calcification at the origin of the right internal carotid artery. No significant hemodynamic stenosis involving the right common and internal carotid arteries. Left common carotid artery/ICA: No significant hemodynamic stenosis involving the left common and internal carotid arteries. Vertebral arteries: Antegrade flow in the right and left vertebral arteries CTA HEAD: Internal carotid arteries: Antegrade flow in the right and left internal carotid arteries. Middle cerebral arteries: Antegrade flow in the middle cerebral arteries bilaterally. Anterior cerebral arteries: Antegrade flow in the anterior cerebral arteries bilaterally. . Vertebral arteries: Antegrade flow in the right and left vertebral arteries Basilar artery: Antegrade flow in the basilar artery.  Superior cerebellar arteries: Antegrade flow in the right and left superior cerebellar hemispheres Posterior cerebral arteries: Antegrade flow in the posterior cerebral arteries bilaterally. Patent posterior communicating artery on the right side. No aneurysms, stenoses or occlusions are noted. The superior sagittal sinus, vein of Duncan, internal cerebral veins, straight sinus, transverse sinuses and sigmoid sinuses are patent. Axial source data: Extensive hemorrhage within the right basal ganglia, right and left lateral ventricles, third and fourth ventricles seen best on the noncontrast CT scan. Areas of abnormal density in the right and left lung fields. Slight heterogeneity involving the right and left lobes of the thyroid gland. 1. Essentially negative CTA of the head and neck. 2. Extensive hemorrhage within the right basal ganglia, lateral, third and fourth ventricles seen best on the noncontrast CT scan of the brain. 3. Areas of abnormal density in the right and left lung fields. 4. Slight heterogeneity involving the right and left lobes of the thyroid gland. **This report has been created using voice recognition software. It may contain minor errors which are inherent in voice recognition technology. ** Final report electronically signed by DR Delores Cage on 6/30/2022 12:40 PM    XR CHEST PORTABLE    Result Date: 6/30/2022  PROCEDURE: XR CHEST PORTABLE CLINICAL INFORMATION: Line placement TECHNIQUE: Mobile AP chest radiograph. COMPARISON: Mobile AP chest radiograph 8/30/2022 at 12:00 PM FINDINGS: The tip of an endotracheal tube is now approximately 1.9 cm superior to the tommie. A nasogastric tube is in stable position. The tip of a new right-sided internal jugular central venous catheter overlies the cavoatrial junction. Cardiomediastinal silhouette is within normal limits. The bilateral costophrenic angles remain blunted. Alveolar and reticular opacities in the bilateral lungs are not significantly changed.  Degenerative changes in the thoracic spine are poorly visualized. 1. Lines and tubes as above. 2. Bilateral atelectasis/infiltrate. 3. Small bilateral pleural effusions. **This report has been created using voice recognition software. It may contain minor errors which are inherent in voice recognition technology. ** Final report electronically signed by Dr. Ninoska Billingsley on 6/30/2022 1:15 PM    XR CHEST PORTABLE    Result Date: 6/30/2022  PROCEDURE: XR CHEST PORTABLE CLINICAL INFORMATION: altered mental status COMPARISON: None TECHNIQUE: AP portable chest radiograph performed. 1. The tip of the endotracheal tube is in the right mainstem bronchus. The endotracheal tube should be withdrawn about 2 cm. The critical  finding(s) was called to Melodie Saha RN in the ER at 1242 hours on 6/30/2022 by Dr. Brayan Du. Verbal acknowledgment and readback was given. 2. Diffuse airspace disease is seen bilaterally. Findings can relate to multilobar pneumonia or pulmonary edema. 3. The nasogastric tube terminates below the diaphragm likely within the stomach. 4. Probable small bilateral pleural effusions. **This report has been created using voice recognition software. It may contain minor errors which are inherent in voice recognition technology. ** Final report electronically signed by Dr Dwain Jordan on 6/30/2022 12:43 PM                   Assessment:    Principal Problem:    Intracranial hemangioma ([address]  Active Problems:    Intracranial hemorrhage ([address]  Resolved Problems:    * No resolved hospital problems. *        Plan:  -POD#3 right-sided craniotomy with image guided partial evacuation of right-sided intraparenchymal hemorrhage  -Medical management per ICU team  -No acute events overnight.  -Seizure precautions  -EVD with 10 ml out  Keep systolic blood pressure between 160-140  -Not withdrawing to painful stimuli.   No gag/pupillary/corneal reflexes  -CT of the head completed today which showed slight progression and diffuse cerebral edema with loss of gray/white differentiation with diffuse sulcal enhancement. Stable hemorrhage and axial herniation.   -NM brain scan completed showing absent cerebral perfusion  -Neurosurgery to follow    Electronically signed by CHELLY Krishnamurthy - CNP on 7/5/2022 at 6:59 AM    Neurosurgery

## 2022-07-05 NOTE — PROGRESS NOTES
CRITICAL CARE NOTE        Patient:  Lauren Jenkins    Unit/Bed:4D-12/012-A  MRN: [id number]   PCP: No primary care provider on file. Date of Admission: 6/30/2022    Assessment and Plan(All pulmonary edema, renal failure, PE, and respiratory failure diagnoses are acute in nature unless otherwise specified):            1. Acute hypoxic respiratory failure: S/p intubation on 6/30 secondary to inability to maintain airway. Maintain peak pressure of 35 or less and plateau pressure of 30 or less. Mental status precludes extubation. 2. Intraparenchymal hemorrhage: With extension into the ventricular system. S/p TXA. S/p EVD placement 6/30. ICP goal <20; SBP Goal <160>100; CPP goal >60. S/p debulking/hematoma evacuation 7/1/22 with NS. Course complicated by bradycardia and fixed pupils overnight 7/2; stat [facility] showed stable hemorrhage but progressive worsened cerebral edema. EVD had multiple clots without drainage s/p tPA in EVD. Repeat CTH 7/4 with stable hemorrhage. Repeat CT head this morning pending. Will discontinue Ancef. Nuclear med study order overnight- pending. Family planning to withdraw care today if no changes in neurologic status. 3. Diabetes Insipidus: 4L urine output overnight. DDAVP given. Trend Na q2h.   4. Seizures: due to [address]. Noted to have a focal seizure in the ED and reportedly at home noted by family member. S/p Keppra loading dose 4.5 g. On 1000 mg Keppra twice daily per neurology. EEG 7/3 abnormal suggesting severe encephalopathy. 5. TOSIN: Cr 1.2 on admission, unclear baseline. Cr 1.8 worsening this morning. On 0.45 NS at 500 cc/hr, will change to 200 cc/hr. Monitor & trend. Avoid nephrotoxins. Will hold off on further work up at this time given poor neurologic prognosis. 6. Hypertensive Emergency: Systolic greater than 410 on arrival to the ED. Nicardipine drip. Systolic blood pressure goal less than 160 but greater than 100. Resolved.   7. Hypokalemia: PRN potassium replacement. 8. Leukocytosis: Likely reactive. Remains afebrile; will trend and monitor for signs of infection. Resolved. CC: AMS  HPI: 59 yo  F with known PMH, presented on 6/30/2022 after being found unresponsive episode at home. Reportedly was at her baseline at 1730 hr, went out with family. Noted to have vomiting and fell on the floor- unwitnessed. Patient was noted to be on the laying on the floor that evening by her sister. Unclear if anything was done. Next morning, she was still laying on the floor. She was drooling and her body was shaking. She was given Narcan in route to the emergency department with no response. CT head showed a large right sided intraparenchymal hemorrhage extending into the ventricular system. Estil Sharp CTA head was negative for aneurysm. S/p intubation, admitted for ICU. S/p EVD placement on 6/30 and hematoma evacuation on 7/1. 7/3: overnight patient had bradycardia that responded to atropine along with fixed pupils. STAT CTH showed progressive diffuse cerebral edema. There were concerns that EVD had clotted. S/p tPA administration. CT head 7/3 with diffuse loss of the basal cisterns and cerebral sulci consistent with diffuse cerebral edema with associated midbrain and tonsillar herniation inferiorly. Also noted to have abnormal low attenuation in the midbrain and wendy, slitlike ventricles- decreased in size. Intraventricular hemorrhage with ventriculostomy tube in place. Subjective (events over the past 24 hours):   No acute events overnight. No withdrawing to painful stimuli. no gag/pupillary/corneal reflexes. Per discussion with night team, family planning to withdraw care this morning if no changes noted in neurologic status. Repeat CT head pending.         ROS: unable to obtain    Objective:  Vitals:  Temp: 99.3 °F (37.4 °C)  Heart Rate: 84  Resp: 20  BP: 133/72  FiO2 : 30 %  SpO2: 98 %  Townsend Coma Scale  Eye Opening: None  Best Verbal Response: None  Best Motor Response: None  Randall Coma Scale Score: 3  OPO Notified: Yes  Date OPO Notified: 07/03/22  Time OPO Notified: [id number]  OPO Referral Number: [id number]    MAP: 84    Ventilator:  Vent Information  Vent Mode: PCV+  Resp Rate (Set): 20 bmp  FiO2 : 30 %  PEEP/CPAP (cmH2O): 6  Pressure Ordered: 30  End Tidal CO2: 30 (%)     Vent Patient Data  Peak Inspiratory Pressure: 29 cmH2O  Mean Airway Pressure: 14 cmH20  Rate Measured: 20 br/min  Vt Exhaled: 388 mL  Minute Volume: 7.1 Liters  I:E Ratio: 1:2      Input/Output: In: 5198   Out: 0472 [Urine:7787; Drains:10]    Intake/Output Summary (Last 24 hours) at 7/5/2022 0544  Last data filed at 7/5/2022 0500  Gross per 24 hour   Intake 4310.54 ml   Output 7180 ml   Net -2869.46 ml   UOP: 0.8 cc/kg/hr  Net UOP since admission: -0.66 L  Last Bowel Movement:     Sigala: Yes, Day # 6 placed on 6/30  Drains: Yes, right scalp bulb drain with right ventriculostomy, placed 7/1, day 5  Drips: Yes, Levo gtt @ 3  Central Lines/Ports: Yes, Location right IJ placed 6/30, Day # 6  Art Line:Yes, Location left radial placed 7/1, Day # 5  PICC Line: No  ETT: Yes, Day # 6 intubated 6/30    Medications:     dextrose 450 mL/hr at 07/05/22 0415    norepinephrine 3 mcg/min (07/05/22 0415)    sodium chloride        levETIRAcetam  1,500 mg IntraVENous Q12H    pantoprazole  40 mg IntraVENous BID    sodium chloride flush  5-40 mL IntraVENous 2 times per day    ceFAZolin  2,000 mg IntraVENous Q8H       BMI:  Body mass index is 30.23 kg/m². Physical Exam:  General: intubated, sedated   HEENT:  normocephalic and atraumatic. No scleral icterus. No PERRL  Neck: supple. Lungs: clear to auscultation. No retractions  Cardiac: RRR. No JVD. Abdomen: soft. Nontender. Nondistended   Extremities:  No clubbing, cyanosis, or edema x 4. Vasculature: capillary refill < 3 seconds. Palpable dorsalis pedis pulses. Skin:  warm and dry. Psych:   Intubated, sedated  Lymph:  No

## 2022-07-05 NOTE — SIGNIFICANT EVENT
Neurologic Assessment for Tracie Costa Death:    Verbal Stimulus:  absent  Noxious Stimulus:  absent  Cough:  absent  Gag:  absent  Pupillary Response:  absent  Corneal Reflex:  absent  Oculocephalic Reflex: absent  Bilateral Cold Caloric Reflex: absent  Apnea Test: No respirations at 5 mins 20 secs. Pre-Apnea AB.35/42/384/23  Post-Apnea AB.22/56/57/23  CT Head : slight progression in diffuse cerebral edema with loss of gray/white differentiation and diffuse sulcal effacement. NM Brain Flow: no cerebral perfusion  EEG:  Not done    Time of Death 1337 hrs on 2022. Entire process was supervised by Dr. Adithya Greer. Electronically signed by Damian Kehr, MD.   I proctored the patient throughout the procedure. Electronically signed by Elda Greer MD.

## 2022-07-05 NOTE — PROGRESS NOTES
Nutrition Note:  Due to change in medical/code status, dietitian will sign off. Plan comfort care. If nutrition intervention is required, please submit a dietitian consult.

## 2022-07-06 ENCOUNTER — APPOINTMENT (OUTPATIENT)
Dept: CARDIAC CATH/INVASIVE PROCEDURES | Age: 57
DRG: 023 | End: 2022-07-06

## 2022-07-06 ENCOUNTER — APPOINTMENT (OUTPATIENT)
Dept: GENERAL RADIOLOGY | Age: 57
DRG: 023 | End: 2022-07-06

## 2022-07-06 LAB
ACINETOBACTER BAUMANNII FILM ARRAY: NOT DETECTED
ACINETOBACTER CALCOACETICUS-BAUMANNII BY PCR: NOT DETECTED
ADENOVIRUS BY PCR: NOT DETECTED
ALBUMIN SERPL-MCNC: 2.4 G/DL (ref 3.5–5.1)
ALBUMIN SERPL-MCNC: 2.7 G/DL (ref 3.5–5.1)
ALBUMIN SERPL-MCNC: 2.9 G/DL (ref 3.5–5.1)
ALBUMIN SERPL-MCNC: 3.4 G/DL (ref 3.5–5.1)
ALLEN TEST: ABNORMAL
ALP BLD-CCNC: 100 U/L (ref 38–126)
ALP BLD-CCNC: 92 U/L (ref 38–126)
ALP BLD-CCNC: 92 U/L (ref 38–126)
ALP BLD-CCNC: 96 U/L (ref 38–126)
ALT SERPL-CCNC: < 5 U/L (ref 11–66)
AMYLASE: 40 U/L (ref 20–104)
AMYLASE: 49 U/L (ref 20–104)
AMYLASE: 68 U/L (ref 20–104)
ANION GAP SERPL CALCULATED.3IONS-SCNC: 11 MEQ/L (ref 8–16)
ANION GAP SERPL CALCULATED.3IONS-SCNC: 12 MEQ/L (ref 8–16)
ANION GAP SERPL CALCULATED.3IONS-SCNC: 14 MEQ/L (ref 8–16)
ANION GAP SERPL CALCULATED.3IONS-SCNC: 15 MEQ/L (ref 8–16)
APTT: 33.9 SECONDS (ref 22–38)
APTT: 34.8 SECONDS (ref 22–38)
APTT: 37.3 SECONDS (ref 22–38)
APTT: 37.9 SECONDS (ref 22–38)
AST SERPL-CCNC: 15 U/L (ref 5–40)
AST SERPL-CCNC: 18 U/L (ref 5–40)
AST SERPL-CCNC: 19 U/L (ref 5–40)
AST SERPL-CCNC: 20 U/L (ref 5–40)
AVERAGE GLUCOSE: 123 MG/DL (ref 70–126)
BACTERIA: ABNORMAL
BASE EXCESS (CALCULATED): -3.8 MMOL/L (ref -2.5–2.5)
BASE EXCESS (CALCULATED): -4.6 MMOL/L (ref -2.5–2.5)
BASE EXCESS (CALCULATED): -4.8 MMOL/L (ref -2.5–2.5)
BASE EXCESS (CALCULATED): -5.7 MMOL/L (ref -2.5–2.5)
BASOPHILS # BLD: 0.1 %
BASOPHILS # BLD: 0.1 %
BASOPHILS # BLD: 0.2 %
BASOPHILS # BLD: 0.4 %
BASOPHILS ABSOLUTE: 0 THOU/MM3 (ref 0–0.1)
BILIRUB SERPL-MCNC: 0.6 MG/DL (ref 0.3–1.2)
BILIRUB SERPL-MCNC: 0.7 MG/DL (ref 0.3–1.2)
BILIRUB SERPL-MCNC: 0.9 MG/DL (ref 0.3–1.2)
BILIRUB SERPL-MCNC: 1.1 MG/DL (ref 0.3–1.2)
BILIRUBIN DIRECT: 0.3 MG/DL (ref 0–0.3)
BILIRUBIN DIRECT: 0.4 MG/DL (ref 0–0.3)
BILIRUBIN DIRECT: 0.7 MG/DL (ref 0–0.3)
BILIRUBIN DIRECT: 0.7 MG/DL (ref 0–0.3)
BILIRUBIN URINE: NEGATIVE
BLOOD, URINE: ABNORMAL
BOTTLE TYPE: ABNORMAL
BUN BLDV-MCNC: 13 MG/DL (ref 7–22)
BUN BLDV-MCNC: 14 MG/DL (ref 7–22)
BUN BLDV-MCNC: 14 MG/DL (ref 7–22)
BUN BLDV-MCNC: 16 MG/DL (ref 7–22)
CALCIUM SERPL-MCNC: 8.7 MG/DL (ref 8.5–10.5)
CALCIUM SERPL-MCNC: 8.8 MG/DL (ref 8.5–10.5)
CALCIUM SERPL-MCNC: 8.9 MG/DL (ref 8.5–10.5)
CALCIUM SERPL-MCNC: 8.9 MG/DL (ref 8.5–10.5)
CANDIDA ALBICANS FILM ARRAY: NOT DETECTED
CANDIDA GLABRATA FILM ARRAY: NOT DETECTED
CANDIDA KRUSEI FILM ARRAY: NOT DETECTED
CANDIDA PARAPSILOSIS FILM ARRAY: NOT DETECTED
CANDIDA TROPICALIS FILM ARRAY: NOT DETECTED
CARBAPENEM RESITANT FILM ARRAY: NOT DETECTED
CASTS: ABNORMAL /LPF
CASTS: ABNORMAL /LPF
CHARACTER, URINE: CLEAR
CHLAMYDIA PNEUMONIAE BY PCR: NOT DETECTED
CHLORIDE BLD-SCNC: 115 MEQ/L (ref 98–111)
CHLORIDE BLD-SCNC: 117 MEQ/L (ref 98–111)
CHLORIDE BLD-SCNC: 121 MEQ/L (ref 98–111)
CHLORIDE BLD-SCNC: 126 MEQ/L (ref 98–111)
CO2: 17 MEQ/L (ref 23–33)
CO2: 18 MEQ/L (ref 23–33)
CO2: 19 MEQ/L (ref 23–33)
CO2: 19 MEQ/L (ref 23–33)
COLLECTED BY:: ABNORMAL
COLOR: YELLOW
CREAT SERPL-MCNC: 1.4 MG/DL (ref 0.4–1.2)
CREAT SERPL-MCNC: 1.5 MG/DL (ref 0.4–1.2)
CRYSTALS: ABNORMAL
DEVICE: ABNORMAL
ENTERBACTER CLOACAE FILM ARRAY: DETECTED
ENTERBACTERIACEAE FILM ARRAY: DETECTED
ENTEROBACTER CLOACAE COMPLEX BY PCR: DETECTED
ENTEROCOCCUS FILM ARRAY: DETECTED
EOSINOPHIL # BLD: 0 %
EOSINOPHIL # BLD: 0.1 %
EOSINOPHIL # BLD: 0.1 %
EOSINOPHIL # BLD: 1.6 %
EOSINOPHIL # BLD: 3.6 %
EOSINOPHIL # BLD: 3.9 %
EOSINOPHILS ABSOLUTE: 0 THOU/MM3 (ref 0–0.4)
EOSINOPHILS ABSOLUTE: 0.1 THOU/MM3 (ref 0–0.4)
EOSINOPHILS ABSOLUTE: 0.3 THOU/MM3 (ref 0–0.4)
EOSINOPHILS ABSOLUTE: 0.3 THOU/MM3 (ref 0–0.4)
EPITHELIAL CELLS, UA: ABNORMAL /HPF
ERYTHROCYTE [DISTWIDTH] IN BLOOD BY AUTOMATED COUNT: 16.3 % (ref 11.5–14.5)
ERYTHROCYTE [DISTWIDTH] IN BLOOD BY AUTOMATED COUNT: 16.5 % (ref 11.5–14.5)
ERYTHROCYTE [DISTWIDTH] IN BLOOD BY AUTOMATED COUNT: 16.6 % (ref 11.5–14.5)
ERYTHROCYTE [DISTWIDTH] IN BLOOD BY AUTOMATED COUNT: 16.6 % (ref 11.5–14.5)
ERYTHROCYTE [DISTWIDTH] IN BLOOD BY AUTOMATED COUNT: 60.8 FL (ref 35–45)
ERYTHROCYTE [DISTWIDTH] IN BLOOD BY AUTOMATED COUNT: 61.6 FL (ref 35–45)
ERYTHROCYTE [DISTWIDTH] IN BLOOD BY AUTOMATED COUNT: 62.4 FL (ref 35–45)
ERYTHROCYTE [DISTWIDTH] IN BLOOD BY AUTOMATED COUNT: 62.9 FL (ref 35–45)
ERYTHROCYTE [DISTWIDTH] IN BLOOD BY AUTOMATED COUNT: 63 FL (ref 35–45)
ERYTHROCYTE [DISTWIDTH] IN BLOOD BY AUTOMATED COUNT: 63.2 FL (ref 35–45)
ESCHERICHIA COLI BY PCR: NOT DETECTED
ESCHERICHIA COLI FILM ARRAY: NOT DETECTED
GAMMA GLUTAMYL TRANSFERASE: 60 U/L (ref 8–69)
GAMMA GLUTAMYL TRANSFERASE: 67 U/L (ref 8–69)
GFR SERPL CREATININE-BSD FRML MDRD: 43 ML/MIN/1.73M2
GFR SERPL CREATININE-BSD FRML MDRD: 47 ML/MIN/1.73M2
GLUCOSE BLD-MCNC: 184 MG/DL (ref 70–108)
GLUCOSE BLD-MCNC: 237 MG/DL (ref 70–108)
GLUCOSE BLD-MCNC: 242 MG/DL (ref 70–108)
GLUCOSE BLD-MCNC: 257 MG/DL (ref 70–108)
GLUCOSE, URINE: 250 MG/DL
HAEMOPHILUS INFLUENZA FILM ARRAY: NOT DETECTED
HAEMOPHILUS INFLUENZAE BY PCR: NOT DETECTED
HBA1C MFR BLD: 6.1 % (ref 4.4–6.4)
HCO3: 19 MMOL/L (ref 23–28)
HCO3: 20 MMOL/L (ref 23–28)
HCO3: 20 MMOL/L (ref 23–28)
HCO3: 21 MMOL/L (ref 23–28)
HCT VFR BLD CALC: 31.1 % (ref 37–47)
HCT VFR BLD CALC: 32.4 % (ref 37–47)
HCT VFR BLD CALC: 34.5 % (ref 37–47)
HCT VFR BLD CALC: 35.6 % (ref 37–47)
HCT VFR BLD CALC: 37.3 % (ref 37–47)
HCT VFR BLD CALC: 39.3 % (ref 37–47)
HEMOGLOBIN: 10.4 GM/DL (ref 12–16)
HEMOGLOBIN: 10.7 GM/DL (ref 12–16)
HEMOGLOBIN: 11.2 GM/DL (ref 12–16)
HEMOGLOBIN: 12 GM/DL (ref 12–16)
HEMOGLOBIN: 9.6 GM/DL (ref 12–16)
HEMOGLOBIN: 9.9 GM/DL (ref 12–16)
IFIO2: 100
IMMATURE GRANS (ABS): 0.07 THOU/MM3 (ref 0–0.07)
IMMATURE GRANS (ABS): 0.1 THOU/MM3 (ref 0–0.07)
IMMATURE GRANS (ABS): 0.11 THOU/MM3 (ref 0–0.07)
IMMATURE GRANS (ABS): 0.14 THOU/MM3 (ref 0–0.07)
IMMATURE GRANS (ABS): 0.14 THOU/MM3 (ref 0–0.07)
IMMATURE GRANS (ABS): 0.15 THOU/MM3 (ref 0–0.07)
IMMATURE GRANULOCYTES: 0.8 %
IMMATURE GRANULOCYTES: 1 %
IMMATURE GRANULOCYTES: 1.2 %
IMMATURE GRANULOCYTES: 1.4 %
IMMATURE GRANULOCYTES: 1.7 %
IMMATURE GRANULOCYTES: 2 %
INFLUENZA A BY PCR: NOT DETECTED
INFLUENZA B BY PCR: NOT DETECTED
INR BLD: 1.38 (ref 0.85–1.13)
INR BLD: 1.44 (ref 0.85–1.13)
INR BLD: 1.53 (ref 0.85–1.13)
INR BLD: 1.72 (ref 0.85–1.13)
KETONES, URINE: NEGATIVE
KLEBSIELLA AEROGENES BY PCR: NOT DETECTED
KLEBSIELLA OXYTOCA BY PCR: NOT DETECTED
KLEBSIELLA OXYTOCA FILM ARRAY: NOT DETECTED
KLEBSIELLA PNEUMONIAE FILM ARRAY: NOT DETECTED
KLEBSIELLA PNEUMONIAE GROUP BY PCR: NOT DETECTED
LD: 306 U/L (ref 100–190)
LD: 319 U/L (ref 100–190)
LEGIONELLA PNEUMOPHILIA BY PCR: NOT DETECTED
LEUKOCYTE ESTERASE, URINE: NEGATIVE
LIPASE: 12.7 U/L (ref 5.6–51.3)
LIPASE: 6.9 U/L (ref 5.6–51.3)
LIPASE: 9 U/L (ref 5.6–51.3)
LISTERIA MONOCYTOGENES FILM ARRAY: NOT DETECTED
LV EF: 45 %
LVEF MODALITY: NORMAL
LYMPHOCYTES # BLD: 10.7 %
LYMPHOCYTES # BLD: 5.4 %
LYMPHOCYTES # BLD: 6.1 %
LYMPHOCYTES # BLD: 6.6 %
LYMPHOCYTES # BLD: 6.6 %
LYMPHOCYTES # BLD: 8.4 %
LYMPHOCYTES ABSOLUTE: 0.5 THOU/MM3 (ref 1–4.8)
LYMPHOCYTES ABSOLUTE: 0.5 THOU/MM3 (ref 1–4.8)
LYMPHOCYTES ABSOLUTE: 0.6 THOU/MM3 (ref 1–4.8)
LYMPHOCYTES ABSOLUTE: 0.6 THOU/MM3 (ref 1–4.8)
LYMPHOCYTES ABSOLUTE: 0.7 THOU/MM3 (ref 1–4.8)
LYMPHOCYTES ABSOLUTE: 0.9 THOU/MM3 (ref 1–4.8)
MAGNESIUM: 1.5 MG/DL (ref 1.6–2.4)
MAGNESIUM: 1.9 MG/DL (ref 1.6–2.4)
MAGNESIUM: 2 MG/DL (ref 1.6–2.4)
MAGNESIUM: 2 MG/DL (ref 1.6–2.4)
MCH RBC QN AUTO: 30.9 PG (ref 26–33)
MCH RBC QN AUTO: 31.2 PG (ref 26–33)
MCH RBC QN AUTO: 31.3 PG (ref 26–33)
MCH RBC QN AUTO: 31.4 PG (ref 26–33)
MCH RBC QN AUTO: 31.4 PG (ref 26–33)
MCH RBC QN AUTO: 31.5 PG (ref 26–33)
MCHC RBC AUTO-ENTMCNC: 30 GM/DL (ref 32.2–35.5)
MCHC RBC AUTO-ENTMCNC: 30.1 GM/DL (ref 32.2–35.5)
MCHC RBC AUTO-ENTMCNC: 30.1 GM/DL (ref 32.2–35.5)
MCHC RBC AUTO-ENTMCNC: 30.5 GM/DL (ref 32.2–35.5)
MCHC RBC AUTO-ENTMCNC: 30.6 GM/DL (ref 32.2–35.5)
MCHC RBC AUTO-ENTMCNC: 30.9 GM/DL (ref 32.2–35.5)
MCV RBC AUTO: 102 FL (ref 81–99)
MCV RBC AUTO: 102.2 FL (ref 81–99)
MCV RBC AUTO: 102.9 FL (ref 81–99)
MCV RBC AUTO: 102.9 FL (ref 81–99)
MCV RBC AUTO: 103.9 FL (ref 81–99)
MCV RBC AUTO: 104.5 FL (ref 81–99)
METAPNEUMOVIRUS BY PCR: NOT DETECTED
METHICILLIN RESISTANT FILM ARRAY: ABNORMAL
MISCELLANEOUS LAB TEST RESULT: ABNORMAL
MODE: ABNORMAL
MONOCYTES # BLD: 13.8 %
MONOCYTES # BLD: 14.6 %
MONOCYTES # BLD: 15.7 %
MONOCYTES # BLD: 8.6 %
MONOCYTES # BLD: 8.9 %
MONOCYTES # BLD: 9.5 %
MONOCYTES ABSOLUTE: 0.7 THOU/MM3 (ref 0.4–1.3)
MONOCYTES ABSOLUTE: 0.9 THOU/MM3 (ref 0.4–1.3)
MONOCYTES ABSOLUTE: 1 THOU/MM3 (ref 0.4–1.3)
MONOCYTES ABSOLUTE: 1.2 THOU/MM3 (ref 0.4–1.3)
MONOCYTES ABSOLUTE: 1.2 THOU/MM3 (ref 0.4–1.3)
MONOCYTES ABSOLUTE: 1.3 THOU/MM3 (ref 0.4–1.3)
MORAXELLA CATARRHALIS BY PCR: NOT DETECTED
MYCOPLASMA PNEUMONIAE BY PCR: NOT DETECTED
NEISSERIA MENIGITIDIS FILM ARRAY: NOT DETECTED
NITRITE, URINE: NEGATIVE
NON-SARS CORONAVIRUS: NOT DETECTED
NUCLEATED RED BLOOD CELLS: 0 /100 WBC
O2 SATURATION: 100 %
PARAINFLUENZA VIRUS BY PCR: NOT DETECTED
PATHOLOGIST REVIEW: ABNORMAL
PCO2: 35 MMHG (ref 35–45)
PH BLOOD GAS: 7.35 (ref 7.35–7.45)
PH BLOOD GAS: 7.37 (ref 7.35–7.45)
PH BLOOD GAS: 7.37 (ref 7.35–7.45)
PH BLOOD GAS: 7.38 (ref 7.35–7.45)
PH UA: 6 (ref 5–9)
PHOSPHORUS: 3.5 MG/DL (ref 2.4–4.7)
PHOSPHORUS: 4.8 MG/DL (ref 2.4–4.7)
PHOSPHORUS: 5 MG/DL (ref 2.4–4.7)
PHOSPHORUS: 5.3 MG/DL (ref 2.4–4.7)
PIP: 29 CMH2O
PIP: 29 CMH2O
PLATELET # BLD: 106 THOU/MM3 (ref 130–400)
PLATELET # BLD: 114 THOU/MM3 (ref 130–400)
PLATELET # BLD: 127 THOU/MM3 (ref 130–400)
PLATELET # BLD: 144 THOU/MM3 (ref 130–400)
PLATELET # BLD: 162 THOU/MM3 (ref 130–400)
PLATELET # BLD: 197 THOU/MM3 (ref 130–400)
PLATELET ESTIMATE: ABNORMAL
PLATELET ESTIMATE: ADEQUATE
PLATELET ESTIMATE: ADEQUATE
PMV BLD AUTO: 11.1 FL (ref 9.4–12.4)
PMV BLD AUTO: 11.2 FL (ref 9.4–12.4)
PMV BLD AUTO: 11.3 FL (ref 9.4–12.4)
PMV BLD AUTO: 11.7 FL (ref 9.4–12.4)
PMV BLD AUTO: 11.8 FL (ref 9.4–12.4)
PMV BLD AUTO: 12 FL (ref 9.4–12.4)
PO2: 272 MMHG (ref 71–104)
PO2: 422 MMHG (ref 71–104)
PO2: 500 MMHG (ref 71–104)
PO2: 504 MMHG (ref 71–104)
POC O2 SATURATION: 66 % (ref 94–97)
POC O2 SATURATION: 91 % (ref 94–97)
POTASSIUM SERPL-SCNC: 3.8 MEQ/L (ref 3.5–5.2)
POTASSIUM SERPL-SCNC: 4.1 MEQ/L (ref 3.5–5.2)
POTASSIUM SERPL-SCNC: 4.1 MEQ/L (ref 3.5–5.2)
POTASSIUM SERPL-SCNC: 4.2 MEQ/L (ref 3.5–5.2)
PROTEIN UA: 100 MG/DL
PROTEUS FILM ARRAY: NOT DETECTED
PROTEUS SPECIES BY PCR: NOT DETECTED
PSEUDOMONAS AERUGINOSA BY PCR: NOT DETECTED
PSEUDOMONAS AERUGINOSA FILM ARRAY: NOT DETECTED
RBC # BLD: 3.05 MILL/MM3 (ref 4.2–5.4)
RBC # BLD: 3.17 MILL/MM3 (ref 4.2–5.4)
RBC # BLD: 3.32 MILL/MM3 (ref 4.2–5.4)
RBC # BLD: 3.46 MILL/MM3 (ref 4.2–5.4)
RBC # BLD: 3.57 MILL/MM3 (ref 4.2–5.4)
RBC # BLD: 3.82 MILL/MM3 (ref 4.2–5.4)
RBC URINE: ABNORMAL /HPF
RENAL EPITHELIAL, UA: ABNORMAL
RESISTANT GENE CTX-M BY PCR: NOT DETECTED
RESISTANT GENE IMP BY PCR: NOT DETECTED
RESISTANT GENE KPC BY PCR: NOT DETECTED
RESISTANT GENE MECA/C & MREJ BY PCR: ABNORMAL
RESISTANT GENE NDM BY PCR: NOT DETECTED
RESISTANT GENE OXA-48-LIKE BY PCR: NOT DETECTED
RESISTANT GENE VIM BY PCR: NOT DETECTED
RESPIRATORY SYNCYTIAL VIRUS BY PCR: NOT DETECTED
RHINOVIRUS ENTEROVIRUS PCR: NOT DETECTED
SCAN OF BLOOD SMEAR: NORMAL
SEG NEUTROPHILS: 69.5 %
SEG NEUTROPHILS: 72.7 %
SEG NEUTROPHILS: 73.7 %
SEG NEUTROPHILS: 82.5 %
SEG NEUTROPHILS: 82.8 %
SEG NEUTROPHILS: 84.9 %
SEGMENTED NEUTROPHILS ABSOLUTE COUNT: 5.6 THOU/MM3 (ref 1.8–7.7)
SEGMENTED NEUTROPHILS ABSOLUTE COUNT: 6 THOU/MM3 (ref 1.8–7.7)
SEGMENTED NEUTROPHILS ABSOLUTE COUNT: 6.2 THOU/MM3 (ref 1.8–7.7)
SEGMENTED NEUTROPHILS ABSOLUTE COUNT: 6.8 THOU/MM3 (ref 1.8–7.7)
SEGMENTED NEUTROPHILS ABSOLUTE COUNT: 8.3 THOU/MM3 (ref 1.8–7.7)
SEGMENTED NEUTROPHILS ABSOLUTE COUNT: 9 THOU/MM3 (ref 1.8–7.7)
SERRATIA MARCESCENS BY PCR: NOT DETECTED
SERRATIA MARCESCENS FILM ARRAY: NOT DETECTED
SET PEEP: 5 MMHG
SET PRESS SUPP: 24 CMH2O
SET PRESS SUPP: 24 CMH2O
SET RESPIRATORY RATE: 20 BPM
SODIUM BLD-SCNC: 146 MEQ/L (ref 135–145)
SODIUM BLD-SCNC: 149 MEQ/L (ref 135–145)
SODIUM BLD-SCNC: 153 MEQ/L (ref 135–145)
SODIUM BLD-SCNC: 156 MEQ/L (ref 135–145)
SOURCE OF BLOOD CULTURE: ABNORMAL
SOURCE, BLOOD GAS: ABNORMAL
SOURCE: ABNORMAL
SPECIFIC GRAVITY UA: 1.02 (ref 1–1.03)
SPECIMEN ACCEPTABILITY: ABNORMAL
STAPH AUREUS BY PCR: NOT DETECTED
STAPH AUREUS FILM ARRAY: NOT DETECTED
STAPHYLOCOCCUS FILM ARRAY: NOT DETECTED
STREP AGALACTIAE BY PCR: NOT DETECTED
STREP AGALACTIAE FILM ARRAY: NOT DETECTED
STREP PNEUMONIAE BY PCR: NOT DETECTED
STREP PNEUMONIAE FILM ARRAY: NOT DETECTED
STREP PYOCGENES FILM ARRAY: NOT DETECTED
STREP PYOGENES BY PCR: NOT DETECTED
STREPTOCOCCUS FILM ARRAY: NOT DETECTED
TOTAL CK: 115 U/L (ref 30–135)
TOTAL CK: 158 U/L (ref 30–135)
TOTAL CK: 77 U/L (ref 30–135)
TOTAL CK: 88 U/L (ref 30–135)
TOTAL PROTEIN: 6.1 G/DL (ref 6.1–8)
TOTAL PROTEIN: 6.4 G/DL (ref 6.1–8)
TOTAL PROTEIN: 6.6 G/DL (ref 6.1–8)
TOTAL PROTEIN: 6.9 G/DL (ref 6.1–8)
TROPONIN T: 0.02 NG/ML
TROPONIN T: 0.02 NG/ML
TROPONIN T: 0.03 NG/ML
TROPONIN T: 0.03 NG/ML
UROBILINOGEN, URINE: 1 EU/DL (ref 0–1)
VANCOMYCIN RESISTANT FILM ARRAY: NOT DETECTED
WBC # BLD: 10 THOU/MM3 (ref 4.8–10.8)
WBC # BLD: 10.6 THOU/MM3 (ref 4.8–10.8)
WBC # BLD: 7.6 THOU/MM3 (ref 4.8–10.8)
WBC # BLD: 8.2 THOU/MM3 (ref 4.8–10.8)
WBC # BLD: 8.5 THOU/MM3 (ref 4.8–10.8)
WBC # BLD: 8.6 THOU/MM3 (ref 4.8–10.8)
WBC UA: ABNORMAL /HPF
YEAST: ABNORMAL

## 2022-07-06 PROCEDURE — 83615 LACTATE (LD) (LDH) ENZYME: CPT

## 2022-07-06 PROCEDURE — 94761 N-INVAS EAR/PLS OXIMETRY MLT: CPT

## 2022-07-06 PROCEDURE — C1887 CATHETER, GUIDING: HCPCS

## 2022-07-06 PROCEDURE — 6360000002 HC RX W HCPCS: Performed by: INTERNAL MEDICINE

## 2022-07-06 PROCEDURE — 2000000000 HC ICU R&B

## 2022-07-06 PROCEDURE — 6370000000 HC RX 637 (ALT 250 FOR IP)

## 2022-07-06 PROCEDURE — 83690 ASSAY OF LIPASE: CPT

## 2022-07-06 PROCEDURE — C1769 GUIDE WIRE: HCPCS

## 2022-07-06 PROCEDURE — 82810 BLOOD GASES O2 SAT ONLY: CPT

## 2022-07-06 PROCEDURE — 85025 COMPLETE CBC W/AUTO DIFF WBC: CPT

## 2022-07-06 PROCEDURE — 87631 RESP VIRUS 3-5 TARGETS: CPT

## 2022-07-06 PROCEDURE — 6360000004 HC RX CONTRAST MEDICATION: Performed by: INTERNAL MEDICINE

## 2022-07-06 PROCEDURE — 87077 CULTURE AEROBIC IDENTIFY: CPT

## 2022-07-06 PROCEDURE — 2700000000 HC OXYGEN THERAPY PER DAY

## 2022-07-06 PROCEDURE — C9113 INJ PANTOPRAZOLE SODIUM, VIA: HCPCS | Performed by: INTERNAL MEDICINE

## 2022-07-06 PROCEDURE — 80053 COMPREHEN METABOLIC PANEL: CPT

## 2022-07-06 PROCEDURE — 82150 ASSAY OF AMYLASE: CPT

## 2022-07-06 PROCEDURE — 87150 DNA/RNA AMPLIFIED PROBE: CPT

## 2022-07-06 PROCEDURE — 88108 CYTOPATH CONCENTRATE TECH: CPT

## 2022-07-06 PROCEDURE — 85730 THROMBOPLASTIN TIME PARTIAL: CPT

## 2022-07-06 PROCEDURE — 2500000003 HC RX 250 WO HCPCS: Performed by: INTERNAL MEDICINE

## 2022-07-06 PROCEDURE — 84484 ASSAY OF TROPONIN QUANT: CPT

## 2022-07-06 PROCEDURE — 6360000002 HC RX W HCPCS: Performed by: NURSE PRACTITIONER

## 2022-07-06 PROCEDURE — 2500000003 HC RX 250 WO HCPCS

## 2022-07-06 PROCEDURE — 93460 R&L HRT ART/VENTRICLE ANGIO: CPT | Performed by: INTERNAL MEDICINE

## 2022-07-06 PROCEDURE — 87798 DETECT AGENT NOS DNA AMP: CPT

## 2022-07-06 PROCEDURE — 2580000003 HC RX 258: Performed by: INTERNAL MEDICINE

## 2022-07-06 PROCEDURE — 81001 URINALYSIS AUTO W/SCOPE: CPT

## 2022-07-06 PROCEDURE — 2720000010 HC SURG SUPPLY STERILE

## 2022-07-06 PROCEDURE — 6360000002 HC RX W HCPCS

## 2022-07-06 PROCEDURE — 93306 TTE W/DOPPLER COMPLETE: CPT

## 2022-07-06 PROCEDURE — 84100 ASSAY OF PHOSPHORUS: CPT

## 2022-07-06 PROCEDURE — 89220 SPUTUM SPECIMEN COLLECTION: CPT

## 2022-07-06 PROCEDURE — 87581 M.PNEUMON DNA AMP PROBE: CPT

## 2022-07-06 PROCEDURE — C1894 INTRO/SHEATH, NON-LASER: HCPCS

## 2022-07-06 PROCEDURE — 82977 ASSAY OF GGT: CPT

## 2022-07-06 PROCEDURE — 94640 AIRWAY INHALATION TREATMENT: CPT

## 2022-07-06 PROCEDURE — 31624 DX BRONCHOSCOPE/LAVAGE: CPT | Performed by: INTERNAL MEDICINE

## 2022-07-06 PROCEDURE — 71045 X-RAY EXAM CHEST 1 VIEW: CPT

## 2022-07-06 PROCEDURE — 3609027000 HC BRONCHOSCOPY

## 2022-07-06 PROCEDURE — 94667 MNPJ CHEST WALL 1ST: CPT

## 2022-07-06 PROCEDURE — 87486 CHLMYD PNEUM DNA AMP PROBE: CPT

## 2022-07-06 PROCEDURE — P9047 ALBUMIN (HUMAN), 25%, 50ML: HCPCS | Performed by: INTERNAL MEDICINE

## 2022-07-06 PROCEDURE — 2580000003 HC RX 258: Performed by: PHYSICIAN ASSISTANT

## 2022-07-06 PROCEDURE — 82248 BILIRUBIN DIRECT: CPT

## 2022-07-06 PROCEDURE — 89051 BODY FLUID CELL COUNT: CPT

## 2022-07-06 PROCEDURE — 87070 CULTURE OTHR SPECIMN AEROBIC: CPT

## 2022-07-06 PROCEDURE — 94003 VENT MGMT INPAT SUBQ DAY: CPT

## 2022-07-06 PROCEDURE — 87075 CULTR BACTERIA EXCEPT BLOOD: CPT

## 2022-07-06 PROCEDURE — 87541 LEGION PNEUMO DNA AMP PROB: CPT

## 2022-07-06 PROCEDURE — 82550 ASSAY OF CK (CPK): CPT

## 2022-07-06 PROCEDURE — 93460 R&L HRT ART/VENTRICLE ANGIO: CPT

## 2022-07-06 PROCEDURE — 0B9J8ZX DRAINAGE OF LEFT LOWER LUNG LOBE, VIA NATURAL OR ARTIFICIAL OPENING ENDOSCOPIC, DIAGNOSTIC: ICD-10-PCS | Performed by: INTERNAL MEDICINE

## 2022-07-06 PROCEDURE — 85610 PROTHROMBIN TIME: CPT

## 2022-07-06 PROCEDURE — 94668 MNPJ CHEST WALL SBSQ: CPT

## 2022-07-06 PROCEDURE — 87186 SC STD MICRODIL/AGAR DIL: CPT

## 2022-07-06 PROCEDURE — 37799 UNLISTED PX VASCULAR SURGERY: CPT

## 2022-07-06 PROCEDURE — 0B9F8ZX DRAINAGE OF RIGHT LOWER LUNG LOBE, VIA NATURAL OR ARTIFICIAL OPENING ENDOSCOPIC, DIAGNOSTIC: ICD-10-PCS | Performed by: INTERNAL MEDICINE

## 2022-07-06 PROCEDURE — 83735 ASSAY OF MAGNESIUM: CPT

## 2022-07-06 PROCEDURE — 82803 BLOOD GASES ANY COMBINATION: CPT

## 2022-07-06 PROCEDURE — 87205 SMEAR GRAM STAIN: CPT

## 2022-07-06 RX ORDER — ALBUMIN (HUMAN) 12.5 G/50ML
25 SOLUTION INTRAVENOUS ONCE
Status: COMPLETED | OUTPATIENT
Start: 2022-07-06 | End: 2022-07-06

## 2022-07-06 RX ORDER — SODIUM CHLORIDE, SODIUM LACTATE, POTASSIUM CHLORIDE, AND CALCIUM CHLORIDE .6; .31; .03; .02 G/100ML; G/100ML; G/100ML; G/100ML
500 INJECTION, SOLUTION INTRAVENOUS ONCE
Status: COMPLETED | OUTPATIENT
Start: 2022-07-06 | End: 2022-07-06

## 2022-07-06 RX ORDER — SODIUM CHLORIDE 450 MG/100ML
INJECTION, SOLUTION INTRAVENOUS CONTINUOUS
Status: DISCONTINUED | OUTPATIENT
Start: 2022-07-06 | End: 2022-07-07 | Stop reason: HOSPADM

## 2022-07-06 RX ADMIN — PIPERACILLIN AND TAZOBACTAM 3375 MG: 3; .375 INJECTION, POWDER, LYOPHILIZED, FOR SOLUTION INTRAVENOUS at 01:30

## 2022-07-06 RX ADMIN — IOPAMIDOL 65 ML: 755 INJECTION, SOLUTION INTRAVENOUS at 12:25

## 2022-07-06 RX ADMIN — IPRATROPIUM BROMIDE AND ALBUTEROL SULFATE 1 AMPULE: .5; 3 SOLUTION RESPIRATORY (INHALATION) at 04:32

## 2022-07-06 RX ADMIN — ALBUMIN (HUMAN) 25 G: 0.25 INJECTION, SOLUTION INTRAVENOUS at 05:52

## 2022-07-06 RX ADMIN — SODIUM CHLORIDE: 4.5 INJECTION, SOLUTION INTRAVENOUS at 19:54

## 2022-07-06 RX ADMIN — IPRATROPIUM BROMIDE AND ALBUTEROL SULFATE 1 AMPULE: .5; 3 SOLUTION RESPIRATORY (INHALATION) at 22:21

## 2022-07-06 RX ADMIN — PANTOPRAZOLE SODIUM 40 MG: 40 INJECTION, POWDER, LYOPHILIZED, FOR SOLUTION INTRAVENOUS at 08:04

## 2022-07-06 RX ADMIN — IPRATROPIUM BROMIDE AND ALBUTEROL SULFATE 1 AMPULE: .5; 3 SOLUTION RESPIRATORY (INHALATION) at 16:26

## 2022-07-06 RX ADMIN — HYDROCORTISONE SODIUM SUCCINATE 300 MG: 100 INJECTION, POWDER, FOR SOLUTION INTRAMUSCULAR; INTRAVENOUS at 00:31

## 2022-07-06 RX ADMIN — IPRATROPIUM BROMIDE AND ALBUTEROL SULFATE 1 AMPULE: .5; 3 SOLUTION RESPIRATORY (INHALATION) at 12:54

## 2022-07-06 RX ADMIN — VASOPRESSIN 0.01 UNITS/MIN: 20 INJECTION PARENTERAL at 21:43

## 2022-07-06 RX ADMIN — HYDROCORTISONE SODIUM SUCCINATE 100 MG: 100 INJECTION, POWDER, FOR SOLUTION INTRAMUSCULAR; INTRAVENOUS at 16:50

## 2022-07-06 RX ADMIN — SODIUM BICARBONATE 25 MEQ: 84 INJECTION INTRAVENOUS at 08:20

## 2022-07-06 RX ADMIN — PIPERACILLIN AND TAZOBACTAM 3375 MG: 3; .375 INJECTION, POWDER, LYOPHILIZED, FOR SOLUTION INTRAVENOUS at 08:13

## 2022-07-06 RX ADMIN — VASOPRESSIN 0.04 UNITS/MIN: 20 INJECTION PARENTERAL at 08:52

## 2022-07-06 RX ADMIN — HYDROCORTISONE SODIUM SUCCINATE 100 MG: 100 INJECTION, POWDER, FOR SOLUTION INTRAMUSCULAR; INTRAVENOUS at 08:04

## 2022-07-06 RX ADMIN — PANTOPRAZOLE SODIUM 40 MG: 40 INJECTION, POWDER, LYOPHILIZED, FOR SOLUTION INTRAVENOUS at 23:00

## 2022-07-06 RX ADMIN — IPRATROPIUM BROMIDE AND ALBUTEROL SULFATE 1 AMPULE: .5; 3 SOLUTION RESPIRATORY (INHALATION) at 08:48

## 2022-07-06 RX ADMIN — ALBUMIN (HUMAN) 25 G: 0.25 INJECTION, SOLUTION INTRAVENOUS at 19:50

## 2022-07-06 RX ADMIN — VASOPRESSIN 0.03 UNITS/MIN: 20 INJECTION PARENTERAL at 00:37

## 2022-07-06 RX ADMIN — SODIUM CHLORIDE: 4.5 INJECTION, SOLUTION INTRAVENOUS at 04:33

## 2022-07-06 RX ADMIN — PANTOPRAZOLE SODIUM 40 MG: 40 INJECTION, POWDER, LYOPHILIZED, FOR SOLUTION INTRAVENOUS at 00:43

## 2022-07-06 RX ADMIN — VANCOMYCIN HYDROCHLORIDE 1750 MG: 5 INJECTION, POWDER, LYOPHILIZED, FOR SOLUTION INTRAVENOUS at 03:01

## 2022-07-06 RX ADMIN — SODIUM CHLORIDE, PRESERVATIVE FREE 10 ML: 5 INJECTION INTRAVENOUS at 08:03

## 2022-07-06 RX ADMIN — MAGNESIUM SULFATE HEPTAHYDRATE 2000 MG: 40 INJECTION, SOLUTION INTRAVENOUS at 05:51

## 2022-07-06 RX ADMIN — SODIUM CHLORIDE, POTASSIUM CHLORIDE, SODIUM LACTATE AND CALCIUM CHLORIDE 500 ML: 600; 310; 30; 20 INJECTION, SOLUTION INTRAVENOUS at 02:27

## 2022-07-06 RX ADMIN — IPRATROPIUM BROMIDE AND ALBUTEROL SULFATE 1 AMPULE: .5; 3 SOLUTION RESPIRATORY (INHALATION) at 00:32

## 2022-07-06 RX ADMIN — PIPERACILLIN AND TAZOBACTAM 3375 MG: 3; .375 INJECTION, POWDER, LYOPHILIZED, FOR SOLUTION INTRAVENOUS at 18:01

## 2022-07-06 RX ADMIN — SODIUM CHLORIDE, PRESERVATIVE FREE 10 ML: 5 INJECTION INTRAVENOUS at 23:01

## 2022-07-06 ASSESSMENT — PULMONARY FUNCTION TESTS
PIF_VALUE: 31
PIF_VALUE: 28
PIF_VALUE: 31

## 2022-07-06 NOTE — PROCEDURES
BRONCHOSCOPY    Indication:Assessment for organ harvesting  Risks and benefits to the procedure were discussed. Alternatives and their risks were discussed as well. Sedation:none      EBL:  none. Findings:   Mucus in both airway's.  No cough.  Right lower lobe BAL with 160 cc lavage with 50 cc returned. Fluid was progressively bloody and diagnostic of right lower lobe alveolar hemorrhage.  Left lower lobe with mucus production. Patient underwent BAL of the left lower lobe with 160 cc lavage and 40 cc returned. There was mucus flecks within the lavage. No hemorrhage.  Right lung mucosa with sloughing. Left lung mucosa with no sloughing. Samples:   BAL right lower lobe, BAL left lower lobe. Complications:  None    Procedure:  After informed consent was obtained, patient received sedation as detailed above. Utilizing the endotracheal tube, the bronchoscope was advanced to the level of the trachea and subsequently the tommie. The tommie was noted to be crisp. Scope was taken to the left and right lung fields. Samples taken as noted above. Bronchoscope was withdrawn. Electronically signed by Leann Juarez M.D.

## 2022-07-06 NOTE — PLAN OF CARE
Problem: ABCDS Injury Assessment  Goal: Absence of physical injury  Outcome: Progressing     Problem: Skin/Tissue Integrity  Goal: Absence of new skin breakdown  Description: 1. Monitor for areas of redness and/or skin breakdown  2. Assess vascular access sites hourly  3. Every 4-6 hours minimum:  Change oxygen saturation probe site  4. Every 4-6 hours:  If on nasal continuous positive airway pressure, respiratory therapy assess nares and determine need for appliance change or resting period.   Outcome: Progressing

## 2022-07-06 NOTE — H&P
[facility]  Sedation/Analgesia History & Physical    Pt Name: Jono Dodson  Account number: [de-identified]  MRN: [id number]  YOB: 1965  Provider Performing Procedure: Alireza Jain MD MD  Referring Provider: Ilene Mooney MD   Primary Care Physician: No primary care provider on file. Date: 7/6/2022    PRE-PROCEDURE    Code Status: FULL CODE  Brief History/Pre-Procedure Diagnosis: Organ donation/LOOP    Consent: : I have discussed with the patient risks, benefits, and alternatives (and relevant risks, benefits, and side effects related to alternatives or not receiving care), and likelihood of the success. The patient and/or representative appear to understand and agree to proceed. The discussion encompasses risks, benefits, and side effects related to the alternatives and the risks related to not receiving the proposed care, treatment, and services. The indication, risks and benefits of the procedure and possible therapeutic consequences and alternatives were discussed with the patient. The patient was given the opportunity to ask questions and to have them answered to his/her satisfaction. Risks of the procedure include but are not limited to the following: Bleeding, hematoma including retroperitoneal hemmorhage, infection, pain and discomfort, injury to the aorta and other blood vessels, rhythm disturbance, low blood pressure, myocardial infarction, stroke, kidney damage/failure, myocardial perforation, allergic reactions to sedatives/contrast material, loss of pulse/vascular compromise requiring surgery, aneurysm/pseudoaneurysm formation, possible loss of a limb/hand/leg, needing blood transfusion, requiring emergent open heart surgery or emergent coronary intervention, the need for intubation/respiratory support, the requirement for defibrillation/cardioversion, and death. Alternatives to and omission of the suggested procedure were discussed.  The patient had no further questions and wished to proceed; the consent form was signed. MEDICAL HISTORY   has no past medical history on file. SURGICAL HISTORY   has a past surgical history that includes craniotomy (N/A, 7/1/2022) and craniotomy (N/A, 6/30/2022).   Additional information:       ALLERGIES   Allergies as of 06/30/2022    (No Known Allergies)     Additional information:       MEDICATIONS     Current Facility-Administered Medications:     vancomycin (VANCOCIN) intermittent dosing (placeholder), , Other, RX Placeholder, Binh Bauer MD    0.45 % sodium chloride infusion, , IntraVENous, Continuous, Binh Bauer MD, Last Rate: 125 mL/hr at 07/06/22 0903, Rate Verify at 07/06/22 0903    ipratropium-albuterol (DUONEB) nebulizer solution 1 ampule, 1 ampule, Inhalation, Q4H, [facility], 1 ampule at 07/06/22 0848    piperacillin-tazobactam (ZOSYN) 3,375 mg in dextrose 5 % 50 mL IVPB extended infusion (mini-bag), 3,375 mg, IntraVENous, Q8H, Rebecca Champagne MD, Last Rate: 12.5 mL/hr at 07/06/22 0903, Rate Verify at 07/06/22 0903    hydrocortisone sodium succinate PF (SOLU-CORTEF) injection 100 mg, 100 mg, IntraVENous, Q8H, Binh Bauer MD, 100 mg at 07/06/22 0804    vasopressin 20 Units in dextrose 5 % 100 mL infusion, 0.01-0.03 Units/min, IntraVENous, Continuous, Binh Bauer MD, Last Rate: 9 mL/hr at 07/06/22 0903, 0.03 Units/min at 07/06/22 0903    norepinephrine (LEVOPHED) 16 mg in sodium chloride 0.9 % 250 mL infusion, 1-100 mcg/min, IntraVENous, Continuous, CHELLY Shultz CNP, Stopped at 07/06/22 0134    magnesium sulfate 2000 mg in 50 mL IVPB premix, 2,000 mg, IntraVENous, PRN, CHELLY Shultz - CNP, Stopped at 07/06/22 0750    pantoprazole (PROTONIX) injection 40 mg, 40 mg, IntraVENous, BID, Bryan Hay MD, 40 mg at 07/06/22 0804    potassium chloride 20 mEq/50 mL IVPB (Central Line), 20 mEq, IntraVENous, PRN, Stopped at 07/05/22 0734 **OR** potassium chloride 10 mEq/100 mL IVPB (Peripheral Line), 10 mEq, IntraVENous, PRN, Claryce Scheuermann, PA-C    sodium chloride flush 0.9 % injection 5-40 mL, 5-40 mL, IntraVENous, 2 times per day, Claryce Scheuermann, PA-C, 10 mL at 07/06/22 0803    sodium chloride flush 0.9 % injection 5-40 mL, 5-40 mL, IntraVENous, PRN, Claryce Scheuermann, PA-C    0.9 % sodium chloride infusion, , IntraVENous, PRN, Claryce Scheuermann, PA-C    [DISCONTINUED] morphine (PF) injection 2 mg, 2 mg, IntraVENous, Q2H PRN **OR** morphine injection 4 mg, 4 mg, IntraVENous, Q2H PRN, Claryce Scheuermann, PA-C  Prior to Admission medications    Not on File     Additional information:       VITAL SIGNS   Vitals:    07/06/22 1100   BP:    Pulse: (!) 111   Resp: 20   Temp:    SpO2: 96%       PHYSICAL:   General: No acute distress  HEENT:  Unremarkable for age  Neck: without increased JVD, carotid pulses 2+ bilaterally without bruits  Heart: RR, tachycardic, S1 & S2 WNL, S4 gallop, without murmurs or rubs  NYHA: 2  Lungs: Bilateral crackles   Abdomen: BS present, without HSM, masses, or tenderness    Extremities: without C,C,E.  Pulses 2+ bilaterally  Mental Status: Sedated/intubated      PLANNED PROCEDURE   [x]Cath  []PCI                []Pacemaker/AICD  []IVIS             []Cardioversion []Peripheral angiography/PTA  [x]Other: RHC      SEDATION  Planned agent:[x]Midazolam []Meperidine [x]Sublimaze []Morphine  []Diazepam  []Other:     ASA Classification:  []1 []2 [x]3 []4 []5  Class 1: A normal healthy patient  Class 2: Pt with mild to moderate systemic disease  Class 3: Severe systemic disease or disturbance  Class 4: Severe systemic disorders that are already life threatening. Class 5: Moribund pt with little chances of survival, for more than 24 hours. Mallampati I Airway Classification:   []1 []2 [x]3 []4    [x]Pre-procedure diagnostic studies complete and results available. Comment:    [x]Previous sedation/anesthesia experiences assessed.    Comment:    [x]The patient is an appropriate candidate to undergo the planned procedure sedation and anesthesia. (Refer to nursing sedation/analgesia documentation record)  [x]Formulation and discussion of sedation/procedure plan, risks, and expectations with patient and/or responsible adult completed. [x]Patient examined immediately prior to the procedure.  (Refer to nursing sedation/analgesia documentation record)    Taiwo Cloud MD MD   Electronically signed 7/6/2022 at 11:55 AM

## 2022-07-06 NOTE — PROGRESS NOTES
. ICU disposable  mobile scope  was used. Assisted Dr. Tyronne Cooks with bronchoscopy procedure. Bronchial washings were obtained. Patient tolerated the procedure well. The bronchoscope was disposed of in a red bag and placed in dirty utility room.

## 2022-07-06 NOTE — PLAN OF CARE
Problem: Respiratory - Adult  Goal: Achieves optimal ventilation and oxygenation  Outcome: Progressing     Problem: Respiratory - Adult  Goal: Able to breathe comfortably  Description: Able to breathe comfortably  Outcome: Progressing   Vent setting optimized to achieve target tidal volume, respiratory rate and ideal oxygen saturations. SBT will be performed when appropriate. Patient Weaning Progress    The patient's vent settings was not able to be weaned this shift. Ventilator settings that were weaned              [] Mode   [] Pressure support weaned   [] Fio2 weaned   [] Peep weaned             Spontaneous weaning trial  was not attempted. Evac tube was  hooked up with continuous low suction(20-30mmHg)      Cuff was not  deflated to determine cuff leak.    *Specific details of weaning located in Ventilator documentation flowsheets*

## 2022-07-07 ENCOUNTER — ANESTHESIA (OUTPATIENT)
Dept: OPERATING ROOM | Age: 57
DRG: 023 | End: 2022-07-07

## 2022-07-07 ENCOUNTER — TELEPHONE (OUTPATIENT)
Dept: SPIRITUAL SERVICES | Facility: CLINIC | Age: 57
End: 2022-07-07

## 2022-07-07 ENCOUNTER — APPOINTMENT (OUTPATIENT)
Dept: GENERAL RADIOLOGY | Age: 57
DRG: 023 | End: 2022-07-07

## 2022-07-07 ENCOUNTER — ANESTHESIA EVENT (OUTPATIENT)
Dept: OPERATING ROOM | Age: 57
DRG: 023 | End: 2022-07-07

## 2022-07-07 VITALS
BODY MASS INDEX: 31.55 KG/M2 | TEMPERATURE: 98.1 F | DIASTOLIC BLOOD PRESSURE: 67 MMHG | SYSTOLIC BLOOD PRESSURE: 147 MMHG | HEIGHT: 65 IN | HEART RATE: 109 BPM | OXYGEN SATURATION: 99 % | RESPIRATION RATE: 20 BRPM | WEIGHT: 189.38 LBS

## 2022-07-07 LAB
ALBUMIN SERPL-MCNC: 2.9 G/DL (ref 3.5–5.1)
ALBUMIN SERPL-MCNC: 3 G/DL (ref 3.5–5.1)
ALP BLD-CCNC: 102 U/L (ref 38–126)
ALP BLD-CCNC: 108 U/L (ref 38–126)
ALT SERPL-CCNC: 6 U/L (ref 11–66)
ALT SERPL-CCNC: < 5 U/L (ref 11–66)
AMYLASE: 41 U/L (ref 20–104)
AMYLASE: 47 U/L (ref 20–104)
AMYLASE: 52 U/L (ref 20–104)
ANION GAP SERPL CALCULATED.3IONS-SCNC: 13 MEQ/L (ref 8–16)
ANION GAP SERPL CALCULATED.3IONS-SCNC: 13 MEQ/L (ref 8–16)
APTT: 34.5 SECONDS (ref 22–38)
APTT: 37.2 SECONDS (ref 22–38)
AST SERPL-CCNC: 21 U/L (ref 5–40)
AST SERPL-CCNC: 25 U/L (ref 5–40)
BACTERIA: ABNORMAL
BAL CHARACTER: ABNORMAL
BAL COLLECTION SITE: ABNORMAL
BAL COLOR: ABNORMAL
BASOPHILS # BLD: 0.1 %
BASOPHILS # BLD: 0.3 %
BASOPHILS ABSOLUTE: 0 THOU/MM3 (ref 0–0.1)
BASOPHILS ABSOLUTE: 0 THOU/MM3 (ref 0–0.1)
BILIRUB SERPL-MCNC: 0.5 MG/DL (ref 0.3–1.2)
BILIRUB SERPL-MCNC: 0.5 MG/DL (ref 0.3–1.2)
BILIRUBIN DIRECT: 0.3 MG/DL (ref 0–0.3)
BILIRUBIN DIRECT: 0.3 MG/DL (ref 0–0.3)
BILIRUBIN URINE: NEGATIVE
BLOOD, URINE: ABNORMAL
BUN BLDV-MCNC: 18 MG/DL (ref 7–22)
BUN BLDV-MCNC: 19 MG/DL (ref 7–22)
CALCIUM SERPL-MCNC: 8.6 MG/DL (ref 8.5–10.5)
CALCIUM SERPL-MCNC: 8.9 MG/DL (ref 8.5–10.5)
CASTS: ABNORMAL /LPF
CASTS: ABNORMAL /LPF
CHARACTER, URINE: CLEAR
CHLORIDE BLD-SCNC: 115 MEQ/L (ref 98–111)
CHLORIDE BLD-SCNC: 117 MEQ/L (ref 98–111)
CO2: 20 MEQ/L (ref 23–33)
CO2: 20 MEQ/L (ref 23–33)
COLOR: YELLOW
CREAT SERPL-MCNC: 1.2 MG/DL (ref 0.4–1.2)
CREAT SERPL-MCNC: 1.4 MG/DL (ref 0.4–1.2)
CRYSTALS: ABNORMAL
EOSINOPHIL # BLD: 0 %
EOSINOPHIL # BLD: 0 %
EOSINOPHILS ABSOLUTE: 0 THOU/MM3 (ref 0–0.4)
EOSINOPHILS ABSOLUTE: 0 THOU/MM3 (ref 0–0.4)
EPITHELIAL CELLS, UA: ABNORMAL /HPF
ERYTHROCYTE [DISTWIDTH] IN BLOOD BY AUTOMATED COUNT: 16 % (ref 11.5–14.5)
ERYTHROCYTE [DISTWIDTH] IN BLOOD BY AUTOMATED COUNT: 16.2 % (ref 11.5–14.5)
ERYTHROCYTE [DISTWIDTH] IN BLOOD BY AUTOMATED COUNT: 59.1 FL (ref 35–45)
ERYTHROCYTE [DISTWIDTH] IN BLOOD BY AUTOMATED COUNT: 59.6 FL (ref 35–45)
GAMMA GLUTAMYL TRANSFERASE: 49 U/L (ref 8–69)
GAMMA GLUTAMYL TRANSFERASE: 53 U/L (ref 8–69)
GAMMA GLUTAMYL TRANSFERASE: 56 U/L (ref 8–69)
GFR SERPL CREATININE-BSD FRML MDRD: 47 ML/MIN/1.73M2
GFR SERPL CREATININE-BSD FRML MDRD: 56 ML/MIN/1.73M2
GLUCOSE BLD-MCNC: 118 MG/DL (ref 70–108)
GLUCOSE BLD-MCNC: 119 MG/DL (ref 70–108)
GLUCOSE BLD-MCNC: 123 MG/DL (ref 70–108)
GLUCOSE, URINE: NEGATIVE MG/DL
HCT VFR BLD CALC: 30 % (ref 37–47)
HCT VFR BLD CALC: 30.6 % (ref 37–47)
HEMOGLOBIN: 9.2 GM/DL (ref 12–16)
HEMOGLOBIN: 9.5 GM/DL (ref 12–16)
IMMATURE GRANS (ABS): 0.1 THOU/MM3 (ref 0–0.07)
IMMATURE GRANS (ABS): 0.15 THOU/MM3 (ref 0–0.07)
IMMATURE GRANULOCYTES: 0.9 %
IMMATURE GRANULOCYTES: 1.1 %
INR BLD: 1.24 (ref 0.85–1.13)
INR BLD: 1.32 (ref 0.85–1.13)
KETONES, URINE: NEGATIVE
LD: 297 U/L (ref 100–190)
LD: 319 U/L (ref 100–190)
LD: 365 U/L (ref 100–190)
LEUKOCYTE ESTERASE, URINE: NEGATIVE
LIPASE: 7.3 U/L (ref 5.6–51.3)
LIPASE: 7.9 U/L (ref 5.6–51.3)
LIPASE: 8.4 U/L (ref 5.6–51.3)
LYMPHOCYTES # BLD: 4 %
LYMPHOCYTES # BLD: 5.8 %
LYMPHOCYTES ABSOLUTE: 0.6 THOU/MM3 (ref 1–4.8)
LYMPHOCYTES ABSOLUTE: 0.6 THOU/MM3 (ref 1–4.8)
LYMPHOCYTES, BAL: 13 % (ref 10–15)
MACROPHAGE/MONOCYTE BAL: 8 % (ref 86–100)
MAGNESIUM: 1.8 MG/DL (ref 1.6–2.4)
MAGNESIUM: 1.9 MG/DL (ref 1.6–2.4)
MCH RBC QN AUTO: 31 PG (ref 26–33)
MCH RBC QN AUTO: 31.1 PG (ref 26–33)
MCHC RBC AUTO-ENTMCNC: 30.7 GM/DL (ref 32.2–35.5)
MCHC RBC AUTO-ENTMCNC: 31 GM/DL (ref 32.2–35.5)
MCV RBC AUTO: 100.3 FL (ref 81–99)
MCV RBC AUTO: 101 FL (ref 81–99)
MISCELLANEOUS LAB TEST RESULT: ABNORMAL
MONOCYTES # BLD: 6.7 %
MONOCYTES # BLD: 7.1 %
MONOCYTES ABSOLUTE: 0.8 THOU/MM3 (ref 0.4–1.3)
MONOCYTES ABSOLUTE: 0.9 THOU/MM3 (ref 0.4–1.3)
NITRITE, URINE: NEGATIVE
NUCLEATED RED BLOOD CELLS: 0 /100 WBC
NUCLEATED RED BLOOD CELLS: 0 /100 WBC
PATHOLOGIST REVIEW: ABNORMAL
PH UA: 5.5 (ref 5–9)
PHOSPHORUS: 4 MG/DL (ref 2.4–4.7)
PHOSPHORUS: 4.1 MG/DL (ref 2.4–4.7)
PLATELET # BLD: 114 THOU/MM3 (ref 130–400)
PLATELET # BLD: 125 THOU/MM3 (ref 130–400)
PMV BLD AUTO: 11.9 FL (ref 9.4–12.4)
PMV BLD AUTO: 12 FL (ref 9.4–12.4)
POTASSIUM SERPL-SCNC: 3.7 MEQ/L (ref 3.5–5.2)
POTASSIUM SERPL-SCNC: 3.7 MEQ/L (ref 3.5–5.2)
PROTEIN UA: ABNORMAL MG/DL
RBC # BLD: 2.97 MILL/MM3 (ref 4.2–5.4)
RBC # BLD: 3.05 MILL/MM3 (ref 4.2–5.4)
RBC BAL: ABNORMAL /CUMM
RBC URINE: ABNORMAL /HPF
RENAL EPITHELIAL, UA: ABNORMAL
SARS-COV-2, NAAT: NOT  DETECTED
SCAN OF BLOOD SMEAR: NORMAL
SEG NEUTROPHILS: 85.9 %
SEG NEUTROPHILS: 88.1 %
SEGMENTED NEUTROPHILS ABSOLUTE COUNT: 12.2 THOU/MM3 (ref 1.8–7.7)
SEGMENTED NEUTROPHILS ABSOLUTE COUNT: 9.5 THOU/MM3 (ref 1.8–7.7)
SEGMENTED NEUTROPHILS, BAL: 79 % (ref 0–3)
SODIUM BLD-SCNC: 148 MEQ/L (ref 135–145)
SODIUM BLD-SCNC: 150 MEQ/L (ref 135–145)
SPECIFIC GRAVITY UA: < 1.005 (ref 1–1.03)
TOTAL CK: 178 U/L (ref 30–135)
TOTAL CK: 248 U/L (ref 30–135)
TOTAL CK: 284 U/L (ref 30–135)
TOTAL NUCLEATED CELLS BAL: 650 /CUMM
TOTAL PROTEIN: 5.5 G/DL (ref 6.1–8)
TOTAL PROTEIN: 6.3 G/DL (ref 6.1–8)
TOTAL VOLUME RECEIVED BAL: 35 ML
TROPONIN T: 0.06 NG/ML
TROPONIN T: 0.1 NG/ML
URINE CULTURE, ROUTINE: NORMAL
UROBILINOGEN, URINE: 0.2 EU/DL (ref 0–1)
WBC # BLD: 11.1 THOU/MM3 (ref 4.8–10.8)
WBC # BLD: 13.9 THOU/MM3 (ref 4.8–10.8)
WBC UA: ABNORMAL /HPF
YEAST: ABNORMAL

## 2022-07-07 PROCEDURE — 84484 ASSAY OF TROPONIN QUANT: CPT

## 2022-07-07 PROCEDURE — 83735 ASSAY OF MAGNESIUM: CPT

## 2022-07-07 PROCEDURE — 2580000003 HC RX 258: Performed by: INTERNAL MEDICINE

## 2022-07-07 PROCEDURE — 80053 COMPREHEN METABOLIC PANEL: CPT

## 2022-07-07 PROCEDURE — 6360000002 HC RX W HCPCS: Performed by: INTERNAL MEDICINE

## 2022-07-07 PROCEDURE — 2580000003 HC RX 258

## 2022-07-07 PROCEDURE — 82948 REAGENT STRIP/BLOOD GLUCOSE: CPT

## 2022-07-07 PROCEDURE — 88313 SPECIAL STAINS GROUP 2: CPT

## 2022-07-07 PROCEDURE — 2709999900 HC NON-CHARGEABLE SUPPLY

## 2022-07-07 PROCEDURE — 3600000016 HC SURGERY LEVEL 6 ADDTL 15MIN

## 2022-07-07 PROCEDURE — 94003 VENT MGMT INPAT SUBQ DAY: CPT

## 2022-07-07 PROCEDURE — 84100 ASSAY OF PHOSPHORUS: CPT

## 2022-07-07 PROCEDURE — 85610 PROTHROMBIN TIME: CPT

## 2022-07-07 PROCEDURE — 94640 AIRWAY INHALATION TREATMENT: CPT

## 2022-07-07 PROCEDURE — 83615 LACTATE (LD) (LDH) ENZYME: CPT

## 2022-07-07 PROCEDURE — 2720000010 HC SURG SUPPLY STERILE

## 2022-07-07 PROCEDURE — 82977 ASSAY OF GGT: CPT

## 2022-07-07 PROCEDURE — 2500000003 HC RX 250 WO HCPCS

## 2022-07-07 PROCEDURE — 71045 X-RAY EXAM CHEST 1 VIEW: CPT

## 2022-07-07 PROCEDURE — 81001 URINALYSIS AUTO W/SCOPE: CPT

## 2022-07-07 PROCEDURE — 85730 THROMBOPLASTIN TIME PARTIAL: CPT

## 2022-07-07 PROCEDURE — 88331 PATH CONSLTJ SURG 1 BLK 1SPC: CPT

## 2022-07-07 PROCEDURE — 83690 ASSAY OF LIPASE: CPT

## 2022-07-07 PROCEDURE — B2161ZZ FLUOROSCOPY OF RIGHT AND LEFT HEART USING LOW OSMOLAR CONTRAST: ICD-10-PCS | Performed by: INTERNAL MEDICINE

## 2022-07-07 PROCEDURE — 88307 TISSUE EXAM BY PATHOLOGIST: CPT

## 2022-07-07 PROCEDURE — 2580000003 HC RX 258: Performed by: PHYSICIAN ASSISTANT

## 2022-07-07 PROCEDURE — 2700000000 HC OXYGEN THERAPY PER DAY

## 2022-07-07 PROCEDURE — 82248 BILIRUBIN DIRECT: CPT

## 2022-07-07 PROCEDURE — 94761 N-INVAS EAR/PLS OXIMETRY MLT: CPT

## 2022-07-07 PROCEDURE — 4A023N8 MEASUREMENT OF CARDIAC SAMPLING AND PRESSURE, BILATERAL, PERCUTANEOUS APPROACH: ICD-10-PCS | Performed by: INTERNAL MEDICINE

## 2022-07-07 PROCEDURE — 3600000006 HC SURGERY LEVEL 6 BASE

## 2022-07-07 PROCEDURE — 6360000002 HC RX W HCPCS

## 2022-07-07 PROCEDURE — 82550 ASSAY OF CK (CPK): CPT

## 2022-07-07 PROCEDURE — B2111ZZ FLUOROSCOPY OF MULTIPLE CORONARY ARTERIES USING LOW OSMOLAR CONTRAST: ICD-10-PCS | Performed by: INTERNAL MEDICINE

## 2022-07-07 PROCEDURE — C9113 INJ PANTOPRAZOLE SODIUM, VIA: HCPCS | Performed by: INTERNAL MEDICINE

## 2022-07-07 PROCEDURE — 6370000000 HC RX 637 (ALT 250 FOR IP)

## 2022-07-07 PROCEDURE — 82150 ASSAY OF AMYLASE: CPT

## 2022-07-07 PROCEDURE — 87635 SARS-COV-2 COVID-19 AMP PRB: CPT

## 2022-07-07 PROCEDURE — 2500000003 HC RX 250 WO HCPCS: Performed by: INTERNAL MEDICINE

## 2022-07-07 PROCEDURE — 85025 COMPLETE CBC W/AUTO DIFF WBC: CPT

## 2022-07-07 RX ORDER — SODIUM CHLORIDE, SODIUM LACTATE, POTASSIUM CHLORIDE, CALCIUM CHLORIDE 600; 310; 30; 20 MG/100ML; MG/100ML; MG/100ML; MG/100ML
INJECTION, SOLUTION INTRAVENOUS CONTINUOUS PRN
Status: DISCONTINUED | OUTPATIENT
Start: 2022-07-07 | End: 2022-07-18 | Stop reason: SDUPTHER

## 2022-07-07 RX ORDER — MANNITOL 20 G/100ML
100 INJECTION, SOLUTION INTRAVENOUS ONCE
Status: COMPLETED | OUTPATIENT
Start: 2022-07-07 | End: 2022-07-07

## 2022-07-07 RX ORDER — ROCURONIUM BROMIDE 10 MG/ML
INJECTION, SOLUTION INTRAVENOUS PRN
Status: DISCONTINUED | OUTPATIENT
Start: 2022-07-07 | End: 2022-07-18 | Stop reason: SDUPTHER

## 2022-07-07 RX ORDER — HEPARIN SODIUM 1000 [USP'U]/ML
INJECTION, SOLUTION INTRAVENOUS; SUBCUTANEOUS PRN
Status: DISCONTINUED | OUTPATIENT
Start: 2022-07-07 | End: 2022-07-18 | Stop reason: SDUPTHER

## 2022-07-07 RX ORDER — PHENYLEPHRINE HYDROCHLORIDE 10 MG/ML
INJECTION INTRAVENOUS PRN
Status: DISCONTINUED | OUTPATIENT
Start: 2022-07-07 | End: 2022-07-18 | Stop reason: SDUPTHER

## 2022-07-07 RX ADMIN — SODIUM CHLORIDE, POTASSIUM CHLORIDE, SODIUM LACTATE AND CALCIUM CHLORIDE: 600; 310; 30; 20 INJECTION, SOLUTION INTRAVENOUS at 13:28

## 2022-07-07 RX ADMIN — PANTOPRAZOLE SODIUM 40 MG: 40 INJECTION, POWDER, LYOPHILIZED, FOR SOLUTION INTRAVENOUS at 08:58

## 2022-07-07 RX ADMIN — PHENYLEPHRINE HYDROCHLORIDE 100 MCG: 10 INJECTION INTRAVENOUS at 13:43

## 2022-07-07 RX ADMIN — PIPERACILLIN AND TAZOBACTAM 3375 MG: 3; .375 INJECTION, POWDER, LYOPHILIZED, FOR SOLUTION INTRAVENOUS at 09:06

## 2022-07-07 RX ADMIN — PHENYLEPHRINE HYDROCHLORIDE 100 MCG: 10 INJECTION INTRAVENOUS at 14:42

## 2022-07-07 RX ADMIN — SODIUM CHLORIDE 500 MG: 9 INJECTION, SOLUTION INTRAVENOUS at 13:44

## 2022-07-07 RX ADMIN — SODIUM CHLORIDE: 4.5 INJECTION, SOLUTION INTRAVENOUS at 12:45

## 2022-07-07 RX ADMIN — HEPARIN SODIUM 30000 UNITS: 1000 INJECTION INTRAVENOUS; SUBCUTANEOUS at 15:17

## 2022-07-07 RX ADMIN — SODIUM CHLORIDE: 9 INJECTION, SOLUTION INTRAVENOUS at 13:16

## 2022-07-07 RX ADMIN — ROCURONIUM BROMIDE 50 MG: 50 INJECTION, SOLUTION INTRAVENOUS at 13:34

## 2022-07-07 RX ADMIN — PHENYLEPHRINE HYDROCHLORIDE 150 MCG: 10 INJECTION INTRAVENOUS at 15:27

## 2022-07-07 RX ADMIN — IPRATROPIUM BROMIDE AND ALBUTEROL SULFATE 1 AMPULE: .5; 3 SOLUTION RESPIRATORY (INHALATION) at 04:33

## 2022-07-07 RX ADMIN — SODIUM CHLORIDE, POTASSIUM CHLORIDE, SODIUM LACTATE AND CALCIUM CHLORIDE: 600; 310; 30; 20 INJECTION, SOLUTION INTRAVENOUS at 15:18

## 2022-07-07 RX ADMIN — PHENYLEPHRINE HYDROCHLORIDE 100 MCG: 10 INJECTION INTRAVENOUS at 15:21

## 2022-07-07 RX ADMIN — PHENYLEPHRINE HYDROCHLORIDE 100 MCG: 10 INJECTION INTRAVENOUS at 13:42

## 2022-07-07 RX ADMIN — PIPERACILLIN AND TAZOBACTAM 3375 MG: 3; .375 INJECTION, POWDER, LYOPHILIZED, FOR SOLUTION INTRAVENOUS at 00:17

## 2022-07-07 RX ADMIN — MANNITOL 50 G: 20 INJECTION, SOLUTION INTRAVENOUS at 14:09

## 2022-07-07 RX ADMIN — PHENYLEPHRINE HYDROCHLORIDE 100 MCG: 10 INJECTION INTRAVENOUS at 13:36

## 2022-07-07 RX ADMIN — HYDROCORTISONE SODIUM SUCCINATE 100 MG: 100 INJECTION, POWDER, FOR SOLUTION INTRAMUSCULAR; INTRAVENOUS at 00:15

## 2022-07-07 RX ADMIN — SODIUM CHLORIDE: 4.5 INJECTION, SOLUTION INTRAVENOUS at 04:52

## 2022-07-07 RX ADMIN — ROCURONIUM BROMIDE 50 MG: 50 INJECTION, SOLUTION INTRAVENOUS at 14:42

## 2022-07-07 RX ADMIN — SODIUM CHLORIDE, PRESERVATIVE FREE 10 ML: 5 INJECTION INTRAVENOUS at 08:58

## 2022-07-07 RX ADMIN — SODIUM CHLORIDE, POTASSIUM CHLORIDE, SODIUM LACTATE AND CALCIUM CHLORIDE: 600; 310; 30; 20 INJECTION, SOLUTION INTRAVENOUS at 13:16

## 2022-07-07 RX ADMIN — HYDROCORTISONE SODIUM SUCCINATE 100 MG: 100 INJECTION, POWDER, FOR SOLUTION INTRAMUSCULAR; INTRAVENOUS at 09:00

## 2022-07-07 ASSESSMENT — PULMONARY FUNCTION TESTS
PIF_VALUE: 27

## 2022-07-07 NOTE — TELEPHONE ENCOUNTER
I spoke to the patient's brother concerning his sister Ada Noriega. [employer] has been called in for procurement. The family was grieving because they have had several close deaths in the past year. The pt's brother shared that they will be in ICU today at noon and asked if I could be present for support. I offered Spiritual support, words of comfort and prayer. The pt's brother also shared that the family has a  on Monday and it's been very difficult for his mother.

## 2022-07-07 NOTE — PLAN OF CARE
Problem: Respiratory - Adult  Goal: Achieves optimal ventilation and oxygenation  Outcome: Not Progressing   Patient is scheduled for organ donation today and no weaning is being done.

## 2022-07-07 NOTE — ANESTHESIA PRE PROCEDURE
Department of Anesthesiology  Preprocedure Note       Name:  Marylen Kief   Age:  60 y.o.  :  1965                                          MRN:  [id number]         Date:  2022      Surgeon: Kiara Fernández):  Celeste Mullins    Procedure: Procedure(s):  ORGAN PROCUREMENT (LIVER, KIDNEY)    Medications prior to admission:   Prior to Admission medications    Not on File       Current medications:    Current Facility-Administered Medications   Medication Dose Route Frequency Provider Last Rate Last Admin    mannitol 20 % IVPB 100 g  100 g IntraVENous Once Imelda Khalil MD        methylPREDNISolone sodium (SOLU-MEDROL) 500 mg in sodium chloride 0.9 % 250 mL IVPB  500 mg IntraVENous Once Imelda Khalil MD        vancomycin Larisa Filter) intermittent dosing (placeholder)   Other RX Miguel Marx MD        0.45 % sodium chloride infusion   IntraVENous Continuous Imelda Khalil  mL/hr at 22 1258 Rate Verify at 22 1258    piperacillin-tazobactam (ZOSYN) 3,375 mg in dextrose 5 % 50 mL IVPB extended infusion (mini-bag)  3,375 mg IntraVENous Q8H Christine Pompa MD 12.5 mL/hr at 22 1258 Rate Verify at 22 1258    hydrocortisone sodium succinate PF (SOLU-CORTEF) injection 100 mg  100 mg IntraVENous Q8H Imelda Khalil MD   100 mg at 22 0900    vasopressin 20 Units in dextrose 5 % 100 mL infusion  0.01-0.03 Units/min IntraVENous Continuous Imelda Khalil MD   Stopped at 22 0717    norepinephrine (LEVOPHED) 16 mg in sodium chloride 0.9 % 250 mL infusion  1-100 mcg/min IntraVENous Continuous CHELLY Cordoba CNP   Stopped at 22 0134    magnesium sulfate 2000 mg in 50 mL IVPB premix  2,000 mg IntraVENous PRN CHELLY Cordoba CNP   Stopped at 22 0750    pantoprazole (PROTONIX) injection 40 mg  40 mg IntraVENous BID Escobar Eugene MD   40 mg at 22 0858    potassium chloride 20 mEq/50 mL IVPB (Central Line)  20 mEq IntraVENous PRN Althia Edge Leticia Meline, PA-C   Stopped at 07/05/22 1995    Or    potassium chloride 10 mEq/100 mL IVPB (Peripheral Line)  10 mEq IntraVENous PRN Jeremiah Ramus, PA-C        sodium chloride flush 0.9 % injection 5-40 mL  5-40 mL IntraVENous 2 times per day Jeremiah Ramus, PA-C   10 mL at 07/07/22 0858    sodium chloride flush 0.9 % injection 5-40 mL  5-40 mL IntraVENous PRN Jeremiah Ramus, PA-C        0.9 % sodium chloride infusion   IntraVENous PRN Jeremiah Ramus, PA-C        morphine injection 4 mg  4 mg IntraVENous Q2H PRN Jeremiah Ramus, PA-C           Allergies:  No Known Allergies    Problem List:    Patient Active Problem List   Diagnosis Code    Intracranial hemangioma (HCC) D18.02    Intracranial hemorrhage ([facility] I62.9       Past Medical History:  History reviewed. No pertinent past medical history.     Past Surgical History:        Procedure Laterality Date    CRANIOTOMY N/A 7/1/2022    CRANIOTOMY HEMATOMA EVACUATION performed by Ramos Jackson MD at [address] [place] [address] N/A 6/30/2022    EXTERNAL VENTRICULAR DRAIN PLACEMENT performed by Ramos Jackson MD at [facility] History:    Social History     Tobacco Use    Smoking status: Not on file    Smokeless tobacco: Not on file   Substance Use Topics    Alcohol use: Not on file                                Counseling given: Not Answered      Vital Signs (Current):   Vitals:    07/07/22 0900 07/07/22 1000 07/07/22 1100 07/07/22 1200   BP:       Pulse: (!) 114 (!) 112 (!) 108 (!) 109   Resp: 20 20 20 20   Temp: 98.1 °F (36.7 °C) 97.9 °F (36.6 °C) 98.1 °F (36.7 °C) 98.1 °F (36.7 °C)   TempSrc: Bladder Bladder  Bladder   SpO2: 99% 99% 99% 99%   Weight:       Height:                                                  BP Readings from Last 3 Encounters:   07/07/22 (!) 147/67       NPO Status:                                                                                 BMI:   Wt Readings from Last 3 Encounters:   07/07/22 189 lb 6 oz (85.9

## 2022-07-07 NOTE — PROCEDURES
[address] [address]  Sedation/Analgesia Post Sedation Record    Pt Name: Jono Sites  Account number: [de-identified]  MRN: [id number]  YOB: 1965  Procedure Performed By: MD MD Renzo Almonte, [address]  Primary Care Physician: No primary care provider on file. Date: 7/7/2022    POST-PROCEDURE    Physicians/Assistants: MD MD Renzo Almonte, RAJWINDER    Procedure Performed:Cath      Sedation/Anesthesia: Versed/ Fentanyl and 2% xylocaine local anesthesia. Estimated Blood Loss: < 50 ml. Specimens Removed: None         Disposition of Specimen: N/A        Complications: No Immediate Complications.        Post-procedure Diagnosis/Findings:     No sig CAD  RHC pressures PH with WP 11               MD MD Renzo Almonte, RAJWINDER  Electronically signed 7/7/2022 at 12:24 AM  Interventional Cardiology

## 2022-07-07 NOTE — PROCEDURES
[address] [id number]                            CARDIAC CATHETERIZATION    PATIENT NAME: Braxton Zee                       :        1965  MED REC NO:   [id number]                           ROOM:       0012  ACCOUNT NO:   [de-identified]                           ADMIT DATE: 2022  PROVIDER:     Marene Goldberg, MD    DATE OF PROCEDURE:  2022    INDICATION:  Pre-organ donation. PROCEDURE:  The patient was already deemed appropriate for LOOP  protocol. She was brought to cardiac catheterization laboratory for  right and left heart catheterization prior to proceeding with organ  donation. After infiltration of the right brachial region with 2%  lidocaine using micropuncture and modified Seldinger technique under  fluoroscopic guidance and ultrasound guidance, I was able to insert a  5-Ethiopian sheath in the right brachial vein. After infiltration of the  right wrist with 2% lidocaine using micropuncture and modified Seldinger  technique under fluoroscopic guidance and ultrasound guidance, I was  able to insert a 6-Ethiopian Slender sheath in the right radial artery. Standard antispasmodic/antithrombotic cocktail was given  intraarterially. We then performed diagnostic coronary angiography  using 5-Ethiopian JL3.5 and JR4 catheters. I performed left heart  catheterization using 5-Ethiopian Seaman balloon-tipped wedge catheter. RIGHT HEART CATHETERIZATION:  RA 11, RV 62/11, PA 61/22 with a mean of  41, wedge pressure 11, PA saturation is 66%. CORONARY ANGIOGRAM:  LEFT MAIN:  Patent without any significant obstruction, it was very  short. LAD:  Patent without any significant obstruction. Diagonal branch  without any significant obstruction. LCX:  No significant obstruction. Gives rise to large OM1, OM2, OM3  systems without any significant obstruction. Left PDA without any  significant obstruction.   RCA:  Nondominant without any significant obstruction. LV:  LV systolic pressure is 197. No significant LV to AO systolic  gradient. LVEDP is 23. IMMEDIATE COMPLICATIONS:  None. MEDICATIONS:  See EMR. ACCESS:  Vasc Band was used for hemostasis. Manual pressure used for  right brachial vein. ESTIMATED BLOOD LOSS:  Less than 50 mL. SUMMARY:  No significant coronary artery disease in left dominant  system; pulmonary hypertension with wedge pressure of 11. PLAN:  Proceed with organ donation per protocol.         Corby Jay MD    D: 07/07/2022 0:29:16       T: 07/07/2022 1:16:13     [email or web address]  Job#: [id number]     Doc#: [id number]    CC:

## 2022-07-08 LAB
BLOOD CULTURE, ROUTINE: ABNORMAL
ORGANISM: ABNORMAL
ORGANISM: ABNORMAL

## 2022-07-11 LAB
ANAEROBIC CULTURE: ABNORMAL
ANAEROBIC CULTURE: ABNORMAL
BLOOD CULTURE, ROUTINE: NORMAL
BODY FLUID CULTURE, STERILE: ABNORMAL
GRAM STAIN RESULT: ABNORMAL
GRAM STAIN RESULT: ABNORMAL
ORGANISM: ABNORMAL
ORGANISM: ABNORMAL

## 2022-07-14 NOTE — DISCHARGE SUMMARY
Hospital Medicine Discharge Summary      Patient Identification:   Candace Marcelo   : 1965  MRN: [id number]   Account: [de-identified]      Patient's PCP: No primary care provider on file. Admit Date: 2022     Discharge Date: 2022      Admitting Physician: Jocy Asencio MD     Discharge Physician: Jazz Zuñiga MD     Discharge Diagnoses: Active Hospital Problems    Diagnosis Date Noted    Intracranial hemangioma ([facility] [address] [D18.02] 2022     Priority: Medium    Intracranial hemorrhage ([address] [I62.9]      Priority: Medium     1. Acute hypoxic respiratory failure: S/p intubation on  secondary to inability to maintain airway. 2. Intraparenchymal hemorrhage: With extension into the ventricular system. S/p TXA.  S/p EVD placement .  ICP goal <20; SBP Goal <160>100; CPP goal >60.  S/p debulking/hematoma evacuation 22 with NS. Course complicated by bradycardia and fixed pupils overnight ; [facility] showed stable hemorrhage but progressive worsened cerebral edema.  EVD had multiple clots without drainage s/p tPA in EVD.  Repeat CTH  with stable hemorrhage. Repeat CT head with worsening diffuse cerebral edema. Nuc Med study with no blood flow. 3. Diabetes Insipidus  4. Seizures: due to  [address]. Noted to have a focal seizure in the ED and reportedly at home noted by family member. S/p Keppra loading dose 4.5 g. On 1000 mg Keppra twice daily per neurology.  EEG 7/3 abnormal suggested severe encephalopathy. 5. TOSIN: Cr 1.2 on admission, unclear baseline. Cr 1.8 worsened. 6. Hypertensive Emergency: Resolved. The patient was seen and examined on day of discharge and this discharge summary is in conjunction with any daily progress note from day of discharge. Hospital Course:   Candace Marcelo is a 60 y.o. female admitted to [facility] on 2022 for ICH.         61 yo [place] American F with known PMH, presented on 2022 after being found unresponsive episode at home. Reportedly was at her baseline at 1730 hr, went out with family. Noted to have vomiting and fell on the floor- unwitnessed. Patient was noted to be on the laying on the floor that evening by her sister. Unclear if anything was done. Next morning, she was still laying on the floor. She was drooling and her body was shaking. She was given Narcan in route to the emergency department with no response. CT head showed a large right sided intraparenchymal hemorrhage extending into the ventricular system.  . CTA head was negative for aneurysm. S/p intubation, admitted for ICU. S/p EVD placement on 6/30 and hematoma evacuation on 7/1. 7/3: overnight patient had bradycardia that responded to atropine along with fixed pupils.  STAT CTH showed progressive diffuse cerebral edema.  There were concerns that EVD had clotted.  S/p tPA administration. CT head 7/3 with diffuse loss of the basal cisterns and cerebral sulci consistent with diffuse cerebral edema with associated midbrain and tonsillar herniation inferiorly. Also noted to have abnormal low attenuation in the midbrain and wendy, slitlike ventricles- decreased in size. Intraventricular hemorrhage with ventriculostomy tube in place. CT Head 7/5: slight progression in diffuse cerebral edema with loss of gray/white differentiation and diffuse sulcal effacement. NM Brain Flow showed no cerebral perfusion. Time of Death 1337 hrs on 07/05/2022. Exam:     Vitals:  Vitals:    07/07/22 0900 07/07/22 1000 07/07/22 1100 07/07/22 1200   BP:       Pulse: (!) 114 (!) 112 (!) 108 (!) 109   Resp: 20 20 20 20   Temp: 98.1 °F (36.7 °C) 97.9 °F (36.6 °C) 98.1 °F (36.7 °C) 98.1 °F (36.7 °C)   TempSrc: Bladder Bladder  Bladder   SpO2: 99% 99% 99% 99%   Weight:       Height:         Weight: Weight: 189 lb 6 oz (85.9 kg)     Labs:  For convenience and continuity at follow-up the following most recent labs are provided:      CBC:    Lab Results   Component Value Date/Time    WBC 13.9 07/07/2022 08:55 AM    HGB 9.5 07/07/2022 08:55 AM    HCT 30.6 07/07/2022 08:55 AM     07/07/2022 08:55 AM       Renal:    Lab Results   Component Value Date/Time     07/07/2022 08:55 AM    K 3.7 07/07/2022 08:55 AM    K 3.9 06/30/2022 11:45 AM     07/07/2022 08:55 AM    CO2 20 07/07/2022 08:55 AM    BUN 19 07/07/2022 08:55 AM    CREATININE 1.4 07/07/2022 08:55 AM    CALCIUM 8.6 07/07/2022 08:55 AM    PHOS 4.1 07/07/2022 08:55 AM         Significant Diagnostic Studies    Radiology:   XR CHEST PORTABLE   Final Result   Impression:   Improving airspace disease right upper and midlung. Otherwise stable study. This document has been electronically signed by: Wellington Reis MD on    07/07/2022 03:47 AM      XR CHEST PORTABLE   Final Result   1. Stable tubes and lines. 2. Multifocal patchy airspace opacities suggestive of pulmonary edema or    multifocal infection. This has worsened from prior imaging. 3. Low lung volumes. 4. Cardiomegaly. This document has been electronically signed by: Rocky Payne MD on    07/06/2022 07:16 PM      XR CHEST PORTABLE   Final Result   Impression:   ET tube is now low lying, tip over the deep tommie, should be retracted    4.0-4.5 cm. This document has been electronically signed by: Clement Mcdaniel MD on    07/06/2022 07:03 AM         CT ABDOMEN PELVIS W IV CONTRAST Additional Contrast? None   Final Result   Impression:   1. Small bowel inflammatory changes with mild distention, see above for    discussion. 2. Diarrheal illness suggested by volume of fluid throughout the colon. 3. Left nephrolithiasis. 4. Gallbladder sludge with cholelithiasis not excluded. 5. See above for additional chronic findings.       This document has been electronically signed by: Melissa Henderson MD on    07/05/2022 10:53 PM      All CTs at this facility use dose modulation techniques and iterative    reconstructions, and/or weight-based dosing   when appropriate to reduce radiation to a low as reasonably achievable. CT CHEST W CONTRAST   Final Result   Impression:   1. Right lower lobe pneumonia with scattered infiltrate and atelectasis    through the remaining lung tissues. 2. Chronic severe bronchiectasis at the left lung base. 3. Endotracheal tube tip at the tommie, repositioning recommended. 4. Left nephrolithiasis. This document has been electronically signed by: Davi Perry MD on    07/05/2022 10:30 PM      All CTs at this facility use dose modulation techniques and iterative    reconstructions, and/or weight-based dosing   when appropriate to reduce radiation to a low as reasonably achievable. XR CHEST PORTABLE   Final Result   1. Extensive bilateral pulmonary opacification, worse than on previous study dated third of June 2022.   2. No change in endotracheal tube, enteric tube and right internal jugular catheter   3. Mild cardiomegaly. .               **This report has been created using voice recognition software. It may contain minor errors which are inherent in voice recognition technology. **      Final report electronically signed by DR Delores Cage on 7/5/2022 3:53 PM      [address]   Final Result      Absent cerebral perfusion. Attempts to reach [facility] were unsuccessful and a secure message was left on 7/5/2022 at 8:22 AM.      Final report electronically signed by Dr. Nimisha Boyd on 7/5/2022 8:26 AM      CT HEAD WO CONTRAST   Final Result   Slight progression in diffuse cerebral edema with loss of gray / white    differentiation and diffuse sulcal effacement. Stable hemorrhage and axial herniation. Postoperative changes. This document has been electronically signed by:  Syeda Hopkins MD on    07/05/2022 07:08 AM      All CTs at this facility use dose modulation techniques and iterative    reconstructions, and/or weight-based dosing   when appropriate to reduce radiation to a low as reasonably achievable. CT HEAD WO CONTRAST   Final Result       1. Persistent inferior midbrain and tonsillar herniation. Pineal calcifications are below the tentorium. 2. Stable parenchymal and intraventricular hemorrhage. 3. Blurring of the gray-white matter differentiation of the cerebral hemispheres. 4. Persistent low attenuation in the brainstem and wendy suggesting infarct. **This report has been created using voice recognition software. It may contain minor errors which are inherent in voice recognition technology. **      Final report electronically signed by Dr. Duran Check on 7/4/2022 7:11 AM      CT HEAD WO CONTRAST   Final Result   Addendum 1 of 1   ** ADDENDUM #1 **      These findings were telephoned to Jair Curiel, the patient's nurse at 5:24pm on    7/3/2022. Final report electronically signed by Dr. Renee Figueroa on 7/3/2022 5:25    PM      ** ORIGINAL REPORT **   PROCEDURE: CT HEAD WO CONTRAST      CLINICAL INFORMATION: monitoring of worsening cerebral edema. Intracranial    hemorrhage. Ventriculostomy tube in place. COMPARISON: Head CT 7/3/2022. Head CT 7/1/2022 and 6/30/2022. TECHNIQUE: Noncontrast 5 mm axial images were obtained through the brain. Sagittal and coronal reconstructions were obtained. All CT scans at this facility use dose modulation, iterative    reconstruction, and/or weight-based dosing when appropriate to reduce    radiation dose to as low as reasonably achievable. FINDINGS:      The size of the frontal horn of the left lateral ventricle has decreased. This is best appreciated when assessing the coronal images. There is a    right frontal ventriculostomy tube. This enters the body of the right    lateral ventricle. Blood fills the    occipital horns of the lateral ventricles. This is unchanged. There is    also blood products in the body of the lateral ventricles bilaterally.     There is pneumocephalus in the frontal horns of both lateral ventricles. There are blood products in the 4th    ventricle. There is persistent diffuse cerebral edema. There is loss of all the    cerebral sulci. The basal cisterns are completely effaced. There is    tonsillar descent. There is inferior midbrain herniation. There is stable parenchymal hemorrhage in the anterior right basal ganglia    with a tract extending to the surface of the right frontal lobe deep to    the craniotomy site. Packing material within the surgical site is    unchanged. There are no new areas of    hemorrhage. There is no midline shift. There is low attenuation which is    diffuse the level the midbrain and wendy. This can represent developing    ischemia. This could also represent beam hardening artifact from the    skull. The patient is intubated and has an esophageal route tube in place. The    paranasal sinuses and mastoid air cells are normally aerated. There is no    suspicious calvarial abnormality. Final       1. Diffuse loss of the basal cisterns and cerebral sulci consistent with diffuse cerebral edema. There is associated midbrain and tonsillar herniation inferiorly. 2. Abnormal low attenuation in the midbrain and wendy can represent artifact versus ischemia. 3. Slitlike ventricles which have decreased in size. There is intraventricular hemorrhage with a ventriculostomy tube in place. **This report has been created using voice recognition software. It may contain minor errors which are inherent in voice recognition technology. **      Final report electronically signed by Dr. Patricia Fields on 7/3/2022 5:13 PM      CT HEAD WO CONTRAST   Final Result   Impression:   1. Postoperative changes right calvarium and frontotemporal junction,    similar to previous. Stable satisfactory position of right frontal    ventriculostomy catheter. 2. Large intraventricular hemorrhage, similar to previous.    3. Progressive diffuse cerebral edema. This document has been electronically signed by: Raciel Martinez MD on    07/03/2022 02:08 AM      All CTs at this facility use dose modulation techniques and iterative    reconstructions, and/or weight-based dosing   when appropriate to reduce radiation to a low as reasonably achievable. CT HEAD WO CONTRAST   Final Result   Impression:   1. Stable intraparenchymal and intraventricular hemorrhage as compared to    the prior study of 7/1/2022. Stable associated midline shift to the left. No new areas of hemorrhage are noted. This document has been electronically signed by: Shauna Salazar MD on    07/02/2022 08:34 AM      All CTs at this facility use dose modulation techniques and iterative    reconstructions, and/or weight-based dosing   when appropriate to reduce radiation to a low as reasonably achievable. CT HEAD WO CONTRAST   Final Result      1. Status post right temporal craniotomy and evacuation of hemorrhage in the right temporal lobe. 2. Intraventricular shunt entering the calvarium in the right frontal region with the tip of the shunt just anterior to the third ventricle. 3. Extensive hemorrhage in the frontal horn of the right lateral ventricle, dependent portions of the right and left lateral ventricles, third and fourth ventricles. Moderate dilatation of the lateral ventricles especially the temporal horns. 4. Diminished attenuation in the white matter suggestive of edema. 5.Proptosis on the left side. 6. Inflammatory changes in the left sphenoid sinus and to lesser extent in the ethmoid air cells bilaterally. 7. Enlarged adenoids. **This report has been created using voice recognition software. It may contain minor errors which are inherent in voice recognition technology. **      Final report electronically signed by DR Angie Villegas on 7/1/2022 3:38 PM      CT HEAD WO CONTRAST   Final Result   Impression:   1.  Slightly decreased hemorrhage within the right basal ganglia. Additional findings are relatively similar to the prior exam.      This document has been electronically signed by: Lyn Moore MD on 07/01/2022 04:53 AM      All CTs at this facility use dose modulation techniques and iterative    reconstructions, and/or weight-based dosing   when appropriate to reduce radiation to a low as reasonably achievable. CT HEAD WO CONTRAST   Final Result   1. Large right-sided intraparenchymal hematoma and large hematoma    throughout the ventricular system with mild leftward midline shift and    mild ventriculomegaly, similar to the prior study. 2. There is a new ventriculostomy tube overlying the foramina of [place]. There is a small amount of gas within the right lateral ventricle. 3. There is severe cerebral edema, similar to the prior study. This document has been electronically signed by: Eber Carcamo MD on    06/30/2022 06:01 PM      All CTs at this facility use dose modulation techniques and iterative    reconstructions, and/or weight-based dosing   when appropriate to reduce radiation to a low as reasonably achievable. XR CHEST PORTABLE   Final Result   1. Lines and tubes as above. 2. Bilateral atelectasis/infiltrate. 3. Small bilateral pleural effusions. **This report has been created using voice recognition software. It may contain minor errors which are inherent in voice recognition technology. **      Final report electronically signed by Dr. Hollie Crump on 6/30/2022 1:15 PM      XR CHEST PORTABLE   Final Result   1. The tip of the endotracheal tube is in the right mainstem bronchus. The endotracheal tube should be withdrawn about 2 cm. The critical  finding(s) was called to Marcy Murillo RN in the ER at 1242 hours on 6/30/2022 by Dr. Daniel Wilson. Verbal    acknowledgment and readback was given. 2. Diffuse airspace disease is seen bilaterally.  Findings can relate to multilobar pneumonia or pulmonary edema. 3. The nasogastric tube terminates below the diaphragm likely within the stomach. 4. Probable small bilateral pleural effusions. **This report has been created using voice recognition software. It may contain minor errors which are inherent in voice recognition technology. **      Final report electronically signed by Dr Mariana Bauman on 6/30/2022 12:43 PM      CTA HEAD W WO CONTRAST (CODE STROKE)   Final Result       1. Essentially negative CTA of the head and neck. 2. Extensive hemorrhage within the right basal ganglia, lateral, third and fourth ventricles seen best on the noncontrast CT scan of the brain. 3. Areas of abnormal density in the right and left lung fields. 4. Slight heterogeneity involving the right and left lobes of the thyroid gland. **This report has been created using voice recognition software. It may contain minor errors which are inherent in voice recognition technology. **      Final report electronically signed by DR Ajit Lynch on 6/30/2022 12:40 PM      [address]A [address]   Final Result       1. Essentially negative CTA of the head and neck. 2. Extensive hemorrhage within the right basal ganglia, lateral, third and fourth ventricles seen best on the noncontrast CT scan of the brain. 3. Areas of abnormal density in the right and left lung fields. 4. Slight heterogeneity involving the right and left lobes of the thyroid gland. **This report has been created using voice recognition software. It may contain minor errors which are inherent in voice recognition technology. **      Final report electronically signed by DR Ajit Lynch on 6/30/2022 12:40 PM      CT Head WO Contrast   Final Result   1. A 4.8 x 3.4 cm intraparenchymal hemorrhage in the right basal ganglion is noted. 8 mm midline shift to the left at the level of the septum pellucidum. Mild hydrocephalus is seen. Intraventricular blood is also noted. The critical  finding(s) was called to Dr. Joan Solis at 1200 hours on 6/30/2022 by Dr. Letitia Dean. Verbal acknowledgment and readback was given. **This report has been created using voice recognition software. It may contain minor errors which are inherent in voice recognition technology. **      Final report electronically signed by Dr Steve West on 6/30/2022 12:07 PM             Consults:     IP CONSULT TO NEUROLOGY  IP CONSULT TO NEUROSURGERY  IP CONSULT TO DIETITIAN  PHARMACY TO DOSE VANCOMYCIN    Disposition:    [] Home       [] TCU       [] Rehab       [] Psych       [] SNF       [] [place]       [x] Other-LOOP    Condition at Discharge: Brain dead    Code Status:  FULL      Follow-up visits:   No follow-up provider specified. Time Spent on discharge is more than 1 hour in the examination, evaluation, counseling and review of medications and discharge plan. Signed: Thank you No primary care provider on file. for the opportunity to be involved in this patient's care.     Electronically signed by Mike Hillman MD on 7/14/2022 at 4:30 PM

## 2022-07-18 NOTE — ANESTHESIA POSTPROCEDURE EVALUATION
Department of Anesthesiology  Postprocedure Note    Patient: Jorge Luis Montes  MRN: [id number]  YOB: 1965  Date of evaluation: 7/18/2022      Procedure Summary     Date: 07/07/22 Room / Location: [address] [facility]    Anesthesia Start: 1316 Anesthesia Stop: 4523    Procedure: ORGAN PROCUREMENT (LIVER, KIDNEY) Diagnosis:       Organ donation      (Organ donation [Z52.9])    Surgeons: Celeste Mullins Responsible Provider: Noelle Reed DO    Anesthesia Type: general ASA Status: 6          Anesthesia Type: No value filed.     Dottie Phase I:      Dottie Phase II:        Anesthesia Post Evaluation    Comments: Patient organ donor

## 2022-07-21 ENCOUNTER — CLINICAL DOCUMENTATION (OUTPATIENT)
Dept: SPIRITUAL SERVICES | Facility: CLINIC | Age: 57
End: 2022-07-21

## 2022-07-21 NOTE — PROGRESS NOTES
I contacted the patient's brother Rev Black to offer my condolences to the family. The family has had a series of closes losses and is grieving. Plan: No further follow up is needed at this time.

## 2022-08-04 ENCOUNTER — CLINICAL DOCUMENTATION (OUTPATIENT)
Dept: SPIRITUAL SERVICES | Facility: CLINIC | Age: 57
End: 2022-08-04

## 2022-08-04 NOTE — PROGRESS NOTES
The  conducted a follow up call to the POA/ brother of the  Ashley Riley.) Since we last spoke his younger brother has also  21 days ago. He expressed his grief and stated in the past 3 weeks his family has experience 3 major losses in their family. He also shared his concerns for his mother which has dementia. His family is struggling but their elizabeth is strong. I offered prayer and words of comfort.

## 2022-08-18 ENCOUNTER — CLINICAL DOCUMENTATION (OUTPATIENT)
Dept: SPIRITUAL SERVICES | Facility: CLINIC | Age: 57
End: 2022-08-18

## 2022-08-18 NOTE — PROGRESS NOTES
The  had a follow up conversation with the family/ Dottie's brother ( Lisa Montiel.) He shared that the family is grieving the death of three close family members within a months time. He stated that his mother has dementia and the family has been caring for her during this time of bereavement. The family is spiritual strong and this has helped them through the process of grief. I offered emotional and spiritual support along with prayer.

## 2022-09-01 ENCOUNTER — CLINICAL DOCUMENTATION (OUTPATIENT)
Dept: SPIRITUAL SERVICES | Facility: CLINIC | Age: 57
End: 2022-09-01

## 2022-09-01 NOTE — PROGRESS NOTES
The  had a follow up conversation with the brother Jose Gonzalez. We discussed the status of his family because they had three significant losses in one month. He shared that the community was going to have a memorial service for his brother at the high school so that the community can have closure. He also shared that his mother dementia and the past few weeks have been emotionally and spiritually exhausting. I offered emotional and spiritual support to include prayer. Follow up is needed.

## 2022-09-15 ENCOUNTER — CLINICAL DOCUMENTATION (OUTPATIENT)
Dept: SPIRITUAL SERVICES | Facility: CLINIC | Age: 57
End: 2022-09-15

## 2022-09-15 NOTE — PROGRESS NOTES
During my follow up conversation with Dottie's brother Danie Dueñas, he shared that the recently had a memorial service for his brother. He said that it was very emotional because the family was overwhelmed by the community support. He also shared that his mother has gone through so many significant losses than you can see a physical change. I offered prayer and words of comfort.

## 2022-09-29 ENCOUNTER — CLINICAL DOCUMENTATION (OUTPATIENT)
Dept: SPIRITUAL SERVICES | Facility: CLINIC | Age: 57
End: 2022-09-29

## 2022-09-29 NOTE — PROGRESS NOTES
The  contacted Elvia Ibanez () brother Rev Kamari Agee as a follow up call because of the major losses and emotional distress in the family. He stated that everyone was doing as well as expected. Following the memorial service for his brother, his nephew was going through depression. Dottie's mother has dementia and is still coping with the death of a daughter and a son. I offered emotional support, prayer and words of comfort.

## 2022-10-13 ENCOUNTER — CLINICAL DOCUMENTATION (OUTPATIENT)
Dept: SPIRITUAL SERVICES | Facility: CLINIC | Age: 57
End: 2022-10-13

## 2022-10-13 NOTE — PROGRESS NOTES
The  made a follow up call to Dottie's brother (Rev Brennon Shaw.) He shared that the family was doing better and that his mother was still healing spiritually. Rev. Ceballos shared the journey of the family's losses and how God has given them strength to endure. I offered prayer and words of spiritual support.

## 2023-02-21 NOTE — FLOWSHEET NOTE
07/01/22 1055   Encounter Summary   Service Provided For: Family   Referral/Consult From: [address] Family members   Last Encounter  07/01/22  (The pt was in surgery. )   Complexity of Encounter Moderate   Begin Time 1045   End Time  1055   Total Time Calculated 10 min   Encounter    Type Initial Screen/Assessment   Assessment/Intervention/Outcome   Assessment Coping; Hopeful   Intervention Active listening   Outcome Comfort;Coping   During my attempted contact with the patient, she was in surgery. The  proceeded to have a conversation with the pt's brother. He shared the pt's current situation and how the family has provided great support. Plan: A follow up visit would be helpful because this incident caught the family unaware.
0730-Assessment complete. Partial bath and linen change done. 0800-Dr Lopez at bedside. Updated Brother Diego Dawson about poor prognosis. 0930-Echo at bedside. 1000-Increased urine output (dilute) with mannitol infusion. 1200-Assessment complete. No changes. 1214-Dr Friend notified that since mannitol given, pt is making 400-500ml of dilute urine/hr, and updated on sodium decreasing from 158-154 in 4 hours. States to notify Dr Evelyn Messina    1216-Called Dr Evelyn Messina. Left message about urine output and sodium levels. 1257-No response from Dr Evelyn Messina. Message sent to [facility] via [employer]. 1345-No response from either neurosurgery provider. Dr Kel Ward updated and 1/2NS discontinued per order. 1410-Jeremías PAC at bedside. Updated on discontinuing IVF and updated on urine output. EVD drain flushed per [facility]. 10ml flushing line and 5ml toward pt. Will monitor output. 1600-Assessment complete. Family at bedside. No changes from previous assessment. Will monitor.      1623-Message sent to [facility] and Dr Wen Franks about critical sodium and U/O    1825-Loop updated that family is planning on withdraw tomorrow
[place] [id number] PROGRESS NOTE      Patient: Basim Feng  Room #: 9K-87/570-T            YOB: 1965  Age: 60 y.o. Gender: female            Admit Date & Time: 6/30/2022 11:28 AM    Assessment:    Interventions:    Outcomes:         noted handoff from prior    Found patient intubated and sedated with sister in room  Sister quite concerned with numerous family members in need of prayers   offered prayers for family. Patient in recovery from procedure but still sedated. Plan:    1. Support family as patient recovers. Electronically signed by Emperatriz Francisco on 7/2/2022 at 9:56 AM.  [address] [place] [address]  [phone]             07/02/22 3529   Encounter Summary   Encounter Overview/Reason  Spiritual/Emotional Needs   Service Provided For: Family   Referral/Consult From: Other    Last Encounter  07/02/22   Complexity of Encounter High   Begin Time 0931   End Time  0936   Total Time Calculated 5 min   Spiritual/Emotional needs   Type Spiritual Distress; Emotional Distress   Assessment/Intervention/Outcome   Assessment Anxious; Compromised coping; Hopeful   Intervention Active listening;Explored/Affirmed feelings, thoughts, concerns;Nurtured Hope;Prayer (assurance of)/[place];Sustaining Presence/Ministry of presence
Patient arrived to unit from ER via ER cart. Patient transferred to ICU bed and placed on continuous ICU bedside monitor. Patient admitted for Intracranial hemorrhage ([facility] [address] [I62.9]  Intracranial hemangioma ([facility] [address] [D18.02]. Vitals obtained. See flowsheets. Patient's IV access includes 20 G RAC, 20G LAC and RIJ CVC. Current infusions and rates of infusion include Cardene at 7.5mg/hr and Propofol at 25mcg/kg/min. Assessment completed by Andrew Mitchell RN. Two nurse skin assessment completed by Jennifer Kearney and Ja Chapman RN. See flowsheets for assessment details. Policies and procedures of ICU unable to be explained to patient at this time. Family member(s)/representative(s) present at time of admission include none. Patient rights explained to family member(s)/representatives and patient, as able. Patient/patient's family member(s)/representative(s) N/A to have physician notified of their admission. All questions posed by patient's family member(s)/representative(s) and patient answered at this time.
Patient was pronounced brain dead 7. 5.2022. Loop procurement surgery anticipated 7.7.2022.  encountered patient's sister Dmitry Chao at bedside. She spoke of patient's constant and overwhelming generosity throughout her life. \"She didn't just say she believed. She lived he elizabeth every day. \" Zeenat Kolton texted family members daily with scripture and inspirational quotations. Dmitry Chao liked the idea of taking up this mantle in [employer] . Assisted Dmitry Chao in taking phot of her holding Dottie's hand. Zeenat Hi was listed as an organ donor on her 's license and her family is supportive of her generous gift. Of note, Dmitry Chao also had a friend die a few day ago. Explained support available from the chaplains for the entire family. She will let them know. Also offered presence of  before and during [employer]. Dmitry Chao gratefully accepted. This is noted on handoff sheet. We offered prayer at bedside giving thanks for the blessings Zeenat Hi has received in her life. , her generosity and death, and healing for the recipients of her organs/tissue.
Pt was unresponsive but was blessed.       07/05/22 1433   Encounter Summary   Service Provided For: Family   Referral/Consult From: Garcia   Last Encounter  07/05/22  (NR)   Complexity of Encounter Low   Begin Time 1145   End Time  1148   Total Time Calculated 3 min   Spiritual/Emotional needs   Type Spiritual Support
Anemia

## 2023-08-18 NOTE — ADDENDUM NOTE
Addendum  created 07/07/22 0932 by Zohra Tao, DO    Clinical Note Signed Update sent to patient.     Will watch for MRI results to be read and then send to Dr. Ugarte to review.   No

## 2024-07-08 NOTE — PROGRESS NOTES
All patient lines pulled, patient placed in body bag and tagged, patient ankle bractlet placed on right angle, patient brought down to the morgue Details (Free Text): Left cheek Detail Level: Detailed Photo Preface (Leave Blank If You Do Not Want): Photographs were obtained today

## (undated) DEVICE — DRAIN,WOUND,15FR,3/16,FULL-FLUTED: Brand: MEDLINE

## (undated) DEVICE — SOLUTION SURG PREP POV IOD 7.5% 4 OZ

## (undated) DEVICE — CLIP LIG SM TI 6 BLU HNDL FOR OPN AND ENDOSCP SGL APPL

## (undated) DEVICE — AGENT HEMSTAT W3XL4IN OXIDIZED REGENERATED CELOS ABSRB FOR

## (undated) DEVICE — CODMAN® SURGICAL PATTIES 1/4" X 1/4" (0.64CM X 0.64CM): Brand: CODMAN®

## (undated) DEVICE — SUTURE PERMA-HAND SZ 2-0 L24IN NONABSORBABLE BLK W/O NDL SA75H

## (undated) DEVICE — PACK-MAJOR

## (undated) DEVICE — SPONGE GZ W4XL4IN COT 12 PLY TYP VII WVN C FLD DSGN

## (undated) DEVICE — CLIP LIG M TI 6 SIL HNDL FOR OPN AND ENDOSCP SGL APPL

## (undated) DEVICE — CARBIDE MATCH HEAD

## (undated) DEVICE — MICRO TIP WIPE: Brand: DEVON

## (undated) DEVICE — SUTURE PERMA-HAND SZ 0 L24IN NONABSORBABLE BLK W/O NDL SILK SA76G

## (undated) DEVICE — CODMAN® CEREBROFLO® EVD CATHETER SET WITH ENDEXO® TECHNOLOGY CONTAINS 5 SETS: Brand: CODMAN® CEREBROFLO®

## (undated) DEVICE — MONOPTY® DISPOSABLE CORE BIOPSY INSTRUMENT, 22MM PENETRATION DEPTH, 18G X 20CM: Brand: MONOPTY

## (undated) DEVICE — BASIC SINGLE BASIN BTC-LF: Brand: MEDLINE INDUSTRIES, INC.

## (undated) DEVICE — SEALANT TISS 4 CC FIBRIN VISTASEAL

## (undated) DEVICE — SUTURE PERMAHAND SZ 2-0 L18IN NONABSORBABLE BLK L26MM SH C012D

## (undated) DEVICE — SUTURE NRLN SZ 4-0 L18IN NONABSORBABLE BLK L17MM RB-1 1/2 C554D

## (undated) DEVICE — AGENT HEMOSTATIC SURGIFLOW MATRIX KIT W/THROMBIN

## (undated) DEVICE — EVACUATOR SURG 100CC SIL BLB SUCT RESVR FOR CLS WND DRNGE

## (undated) DEVICE — INTENDED FOR TISSUE SEPARATION, AND OTHER PROCEDURES THAT REQUIRE A SHARP SURGICAL BLADE TO PUNCTURE OR CUT.: Brand: BARD-PARKER ® CARBON RIB-BACK BLADES

## (undated) DEVICE — SUTURE PROL SZ 5-0 L30IN NONABSORBABLE BLU L13MM RB-2 1/2 8710H

## (undated) DEVICE — SUTURE ETHLN SZ 1-0 L30IN NONABSORBABLE BLK LR L75MM 3/8 489T

## (undated) DEVICE — CODMAN® SURGICAL PATTIES 1/2" X1 1/2" (1.27CM X 3.81CM): Brand: CODMAN®

## (undated) DEVICE — 3.0MM TAPERED ROUTER

## (undated) DEVICE — FISH HOOKS, W/SUTURE, RUBBER BAND BLUNT, BARBLESS: Brand: DEROYAL

## (undated) DEVICE — WAX SURG 2.5GM HEMSTAT BNE BEESWAX PARAFFIN ISO PALMITATE

## (undated) DEVICE — APPLICATOR MEDICATED 26 CC SOLUTION HI LT ORNG CHLORAPREP

## (undated) DEVICE — RELOAD STPL L75MM OPN H3.8MM CLS 1.5MM WIRE DIA0.2MM REG

## (undated) DEVICE — SUTURE PERMA-HAND SZ 3-0 L24IN NONABSORBABLE BLK W/O NDL SA74H

## (undated) DEVICE — SYRINGE MED 10ML LUERLOCK TIP W/O SFTY DISP

## (undated) DEVICE — CRANIALMASK TRACKER: Brand: CRANIALMASK TRACKER

## (undated) DEVICE — DISPOSABLE STANDARD BIPOLAR FORCEPS, NON-STICK,: Brand: SPETZLER-MALIS

## (undated) DEVICE — TIDISHIELD SURGICAL PATIENT DRAPE WITH INVASIVE APERTURES BLUE STERILE UNIVERSAL NAVIGATIONAL CRANIOTOMY 8 PER CASE: Brand: TIDISHIELD

## (undated) DEVICE — YANKAUER,BULB TIP,W/O VENT,RIGID,STERILE: Brand: MEDLINE

## (undated) DEVICE — SET CATH 20GA L1.75IN RAD ART POLYUR RADPQ W/ INTEGR

## (undated) DEVICE — MICRO KOVER: Brand: UNBRANDED

## (undated) DEVICE — SPONGE LAP W18XL18IN WHT COT 4 PLY FLD STRUNG RADPQ DISP ST

## (undated) DEVICE — PREP SOL PVP IODINE 4%  4 OZ/BTL

## (undated) DEVICE — SHEET, T, LAPAROTOMY, STERILE: Brand: MEDLINE

## (undated) DEVICE — CODMAN® DISPOSABLE PERFORATOR 14MM: Brand: CODMAN®

## (undated) DEVICE — SCALPFIX STERILE: Brand: AESCULAP

## (undated) DEVICE — HYPODERMIC SAFETY NEEDLE: Brand: MAGELLAN

## (undated) DEVICE — INSTRUMENT BATTERY

## (undated) DEVICE — CODMAN® SURGICAL PATTIES 1/2" X 1/2" (1.27CM X 1.27CM): Brand: CODMAN®

## (undated) DEVICE — 35 ML SYRINGE LUER-LOCK TIP: Brand: MONOJECT

## (undated) DEVICE — TUBING, SUCTION, 1/4" X 20', STRAIGHT: Brand: MEDLINE INDUSTRIES, INC.

## (undated) DEVICE — PENCIL SMK EVAC ALL IN 1 DSGN ENH VISIBILITY IMPROVED AIR

## (undated) DEVICE — CATHETER IV 16 GAX32.5 IN TRPL BVL LUERLOCK TIP ANGIOCATH

## (undated) DEVICE — CRANI PACK: Brand: MEDLINE INDUSTRIES, INC.

## (undated) DEVICE — STRYKER PERFORMANCE SERIES STERNUM BLADE: Brand: STRYKER PERFORMANCE SERIES

## (undated) DEVICE — YANKAUER,POOLE TIP,STERILE,50/CS: Brand: MEDLINE

## (undated) DEVICE — PIN ADLT MAYFIELD RIGID MOLD FINGER

## (undated) DEVICE — GOWN,SIRUS,NONRNF,SETINSLV,XL,20/CS: Brand: MEDLINE

## (undated) DEVICE — OIL CARTRIDGE: Brand: CORE, MAESTRO

## (undated) DEVICE — AGENT HEMSTAT W2XL4IN OXIDIZED REGENERATED CELOS ABSRB SFT

## (undated) DEVICE — GLOVE SURG SZ 55 THK91MIL ORANGE  LTX FREE SYN POLYISOPRENE

## (undated) DEVICE — DIFFUSER: Brand: CORE, MAESTRO

## (undated) DEVICE — COVER,MAYO STAND,STERILE: Brand: MEDLINE

## (undated) DEVICE — ACCUDRAIN® EXTERNAL CSF DRAINAGE SYSTEM WITH ANTI-REFLUX VALVE: Brand: ACCUDRAIN®

## (undated) DEVICE — STAPLER INT L75MM CUT LN L73MM STPL LN L77MM BLU B FRM 8

## (undated) DEVICE — PREMIUM DRY TRAY LF: Brand: MEDLINE INDUSTRIES, INC.

## (undated) DEVICE — PACK PROCEDURE SURG SET UP SRMC